# Patient Record
Sex: FEMALE | Race: WHITE | Employment: OTHER | URBAN - METROPOLITAN AREA
[De-identification: names, ages, dates, MRNs, and addresses within clinical notes are randomized per-mention and may not be internally consistent; named-entity substitution may affect disease eponyms.]

---

## 2017-08-15 PROBLEM — L08.9 DIABETIC FOOT INFECTION (HCC): Status: ACTIVE | Noted: 2017-08-15

## 2017-08-15 PROBLEM — Z99.2 CHRONIC KIDNEY DISEASE WITH END STAGE RENAL FAILURE ON DIALYSIS (HCC): Status: ACTIVE | Noted: 2017-08-15

## 2017-08-15 PROBLEM — D72.829 LEUKOCYTOSIS: Status: ACTIVE | Noted: 2017-08-15

## 2017-08-15 PROBLEM — E11.628 DIABETIC FOOT INFECTION (HCC): Status: ACTIVE | Noted: 2017-08-15

## 2017-08-15 PROBLEM — N18.6 CHRONIC KIDNEY DISEASE WITH END STAGE RENAL FAILURE ON DIALYSIS (HCC): Status: ACTIVE | Noted: 2017-08-15

## 2017-08-16 PROBLEM — M86.9 OSTEOMYELITIS OF TOE OF LEFT FOOT (HCC): Status: ACTIVE | Noted: 2017-08-16

## 2017-08-16 PROBLEM — L03.116 CELLULITIS OF LEFT LOWER EXTREMITY: Status: ACTIVE | Noted: 2017-08-16

## 2019-01-01 ENCOUNTER — OFFICE VISIT (OUTPATIENT)
Dept: ORTHOPEDIC SURGERY | Age: 59
End: 2019-01-01

## 2019-01-01 ENCOUNTER — HOSPITAL ENCOUNTER (OUTPATIENT)
Dept: VASCULAR LAB | Age: 59
Discharge: HOME | End: 2019-04-30
Payer: MEDICARE

## 2019-01-01 ENCOUNTER — PREP FOR PROCEDURE (OUTPATIENT)
Dept: VASCULAR SURGERY | Age: 59
End: 2019-01-01

## 2019-01-01 ENCOUNTER — APPOINTMENT (OUTPATIENT)
Dept: GENERAL RADIOLOGY | Age: 59
DRG: 463 | End: 2019-01-01
Payer: MEDICARE

## 2019-01-01 ENCOUNTER — APPOINTMENT (OUTPATIENT)
Dept: NUCLEAR MEDICINE | Age: 59
DRG: 463 | End: 2019-01-01
Payer: MEDICARE

## 2019-01-01 ENCOUNTER — APPOINTMENT (OUTPATIENT)
Dept: CT IMAGING | Age: 59
DRG: 463 | End: 2019-01-01
Payer: MEDICARE

## 2019-01-01 ENCOUNTER — APPOINTMENT (OUTPATIENT)
Dept: GENERAL RADIOLOGY | Age: 59
DRG: 485 | End: 2019-01-01
Payer: MEDICARE

## 2019-01-01 ENCOUNTER — APPOINTMENT (OUTPATIENT)
Dept: GENERAL RADIOLOGY | Age: 59
DRG: 356 | End: 2019-01-01
Payer: MEDICARE

## 2019-01-01 ENCOUNTER — APPOINTMENT (OUTPATIENT)
Dept: CT IMAGING | Age: 59
DRG: 356 | End: 2019-01-01
Payer: MEDICARE

## 2019-01-01 ENCOUNTER — TELEPHONE (OUTPATIENT)
Dept: ORTHOPEDIC SURGERY | Age: 59
End: 2019-01-01

## 2019-01-01 ENCOUNTER — HOSPITAL ENCOUNTER (OUTPATIENT)
Dept: VASCULAR LAB | Age: 59
Discharge: HOME | End: 2019-11-12
Payer: MEDICARE

## 2019-01-01 ENCOUNTER — HOSPITAL ENCOUNTER (OUTPATIENT)
Dept: WOUND CARE | Age: 59
Discharge: HOME | End: 2019-06-17
Payer: MEDICARE

## 2019-01-01 ENCOUNTER — TELEPHONE (OUTPATIENT)
Dept: INFECTIOUS DISEASES | Age: 59
End: 2019-01-01

## 2019-01-01 ENCOUNTER — HOSPITAL ENCOUNTER (OUTPATIENT)
Dept: WOUND CARE | Age: 59
Discharge: HOME | End: 2019-11-12
Payer: MEDICARE

## 2019-01-01 ENCOUNTER — HOSPITAL ENCOUNTER (INPATIENT)
Age: 59
LOS: 4 days | Discharge: HOME | DRG: 356 | End: 2019-12-19
Attending: EMERGENCY MEDICINE | Admitting: INTERNAL MEDICINE
Payer: MEDICARE

## 2019-01-01 ENCOUNTER — HOSPITAL ENCOUNTER (OUTPATIENT)
Dept: WOUND CARE | Age: 59
Discharge: HOME | End: 2019-11-06
Payer: MEDICARE

## 2019-01-01 ENCOUNTER — ANESTHESIA EVENT (OUTPATIENT)
Dept: OPERATING ROOM | Age: 59
DRG: 485 | End: 2019-01-01
Payer: MEDICARE

## 2019-01-01 ENCOUNTER — INITIAL CONSULT (OUTPATIENT)
Dept: SURGERY | Age: 59
End: 2019-01-01
Payer: MEDICARE

## 2019-01-01 ENCOUNTER — ANESTHESIA (OUTPATIENT)
Dept: OPERATING ROOM | Age: 59
DRG: 485 | End: 2019-01-01
Payer: MEDICARE

## 2019-01-01 ENCOUNTER — HOSPITAL ENCOUNTER (OUTPATIENT)
Dept: WOUND CARE | Age: 59
Discharge: HOME | End: 2019-06-27
Payer: MEDICARE

## 2019-01-01 ENCOUNTER — HOSPITAL ENCOUNTER (OUTPATIENT)
Dept: WOUND CARE | Age: 59
Discharge: HOME | End: 2019-07-02
Payer: MEDICARE

## 2019-01-01 ENCOUNTER — TELEPHONE (OUTPATIENT)
Dept: WOUND CARE | Age: 59
End: 2019-01-01

## 2019-01-01 ENCOUNTER — HOSPITAL ENCOUNTER (OUTPATIENT)
Dept: WOUND CARE | Age: 59
Discharge: HOME | End: 2019-09-17
Payer: MEDICARE

## 2019-01-01 ENCOUNTER — HOSPITAL ENCOUNTER (OUTPATIENT)
Dept: CARDIAC CATH/INVASIVE PROCEDURES | Age: 59
Discharge: HOME | End: 2019-06-06
Payer: MEDICARE

## 2019-01-01 ENCOUNTER — HOSPITAL ENCOUNTER (OUTPATIENT)
Dept: WOUND CARE | Age: 59
Discharge: HOME | End: 2019-07-09
Payer: MEDICARE

## 2019-01-01 ENCOUNTER — OFFICE VISIT (OUTPATIENT)
Dept: ORTHOPEDIC SURGERY | Age: 59
End: 2019-01-01
Payer: MEDICARE

## 2019-01-01 ENCOUNTER — HOSPITAL ENCOUNTER (OUTPATIENT)
Dept: WOUND CARE | Age: 59
Discharge: HOME | End: 2019-10-29
Payer: MEDICARE

## 2019-01-01 ENCOUNTER — HOSPITAL ENCOUNTER (INPATIENT)
Age: 59
LOS: 9 days | Discharge: NURSING, REHAB OR LONG-TERM CARE | DRG: 463 | End: 2019-07-25
Attending: EMERGENCY MEDICINE | Admitting: INTERNAL MEDICINE
Payer: MEDICARE

## 2019-01-01 ENCOUNTER — APPOINTMENT (OUTPATIENT)
Dept: VASCULAR LAB | Age: 59
DRG: 463 | End: 2019-01-01
Payer: MEDICARE

## 2019-01-01 ENCOUNTER — ANESTHESIA EVENT (OUTPATIENT)
Dept: OPERATING ROOM | Age: 59
DRG: 463 | End: 2019-01-01
Payer: MEDICARE

## 2019-01-01 ENCOUNTER — HOSPITAL ENCOUNTER (EMERGENCY)
Age: 59
Discharge: HOME | End: 2019-07-27
Attending: EMERGENCY MEDICINE
Payer: MEDICARE

## 2019-01-01 ENCOUNTER — HOSPITAL ENCOUNTER (OUTPATIENT)
Dept: WOUND CARE | Age: 59
Discharge: HOME | DRG: 463 | End: 2019-07-16
Payer: MEDICARE

## 2019-01-01 ENCOUNTER — HOSPITAL ENCOUNTER (INPATIENT)
Age: 59
LOS: 3 days | Discharge: NURSING, REHAB OR LONG-TERM CARE | DRG: 485 | End: 2019-11-27
Attending: EMERGENCY MEDICINE | Admitting: INTERNAL MEDICINE
Payer: MEDICARE

## 2019-01-01 ENCOUNTER — HOSPITAL ENCOUNTER (OUTPATIENT)
Dept: WOUND CARE | Age: 59
Discharge: HOME | End: 2019-11-19
Payer: MEDICARE

## 2019-01-01 ENCOUNTER — ANESTHESIA (OUTPATIENT)
Dept: OPERATING ROOM | Age: 59
DRG: 463 | End: 2019-01-01
Payer: MEDICARE

## 2019-01-01 VITALS
SYSTOLIC BLOOD PRESSURE: 158 MMHG | HEART RATE: 87 BPM | RESPIRATION RATE: 18 BRPM | TEMPERATURE: 98.1 F | DIASTOLIC BLOOD PRESSURE: 66 MMHG

## 2019-01-01 VITALS
RESPIRATION RATE: 16 BRPM | TEMPERATURE: 97.7 F | WEIGHT: 284 LBS | HEIGHT: 67 IN | HEART RATE: 76 BPM | OXYGEN SATURATION: 96 % | SYSTOLIC BLOOD PRESSURE: 126 MMHG | DIASTOLIC BLOOD PRESSURE: 49 MMHG | BODY MASS INDEX: 44.57 KG/M2

## 2019-01-01 VITALS
WEIGHT: 293 LBS | SYSTOLIC BLOOD PRESSURE: 100 MMHG | BODY MASS INDEX: 47.14 KG/M2 | HEART RATE: 100 BPM | DIASTOLIC BLOOD PRESSURE: 51 MMHG

## 2019-01-01 VITALS — DIASTOLIC BLOOD PRESSURE: 65 MMHG | SYSTOLIC BLOOD PRESSURE: 149 MMHG | TEMPERATURE: 96.8 F | OXYGEN SATURATION: 98 %

## 2019-01-01 VITALS
BODY MASS INDEX: 45.99 KG/M2 | DIASTOLIC BLOOD PRESSURE: 60 MMHG | HEART RATE: 93 BPM | WEIGHT: 293 LBS | SYSTOLIC BLOOD PRESSURE: 120 MMHG | HEIGHT: 67 IN

## 2019-01-01 VITALS
HEIGHT: 67 IN | TEMPERATURE: 98.2 F | OXYGEN SATURATION: 97 % | SYSTOLIC BLOOD PRESSURE: 164 MMHG | RESPIRATION RATE: 14 BRPM | BODY MASS INDEX: 41.18 KG/M2 | DIASTOLIC BLOOD PRESSURE: 83 MMHG | WEIGHT: 262.35 LBS | HEART RATE: 79 BPM

## 2019-01-01 VITALS
TEMPERATURE: 98 F | BODY MASS INDEX: 44.12 KG/M2 | SYSTOLIC BLOOD PRESSURE: 130 MMHG | HEIGHT: 67 IN | HEIGHT: 67 IN | BODY MASS INDEX: 44.57 KG/M2 | WEIGHT: 284 LBS | WEIGHT: 281.09 LBS | RESPIRATION RATE: 18 BRPM | DIASTOLIC BLOOD PRESSURE: 56 MMHG | HEART RATE: 91 BPM

## 2019-01-01 VITALS
DIASTOLIC BLOOD PRESSURE: 78 MMHG | HEART RATE: 111 BPM | RESPIRATION RATE: 16 BRPM | TEMPERATURE: 98 F | SYSTOLIC BLOOD PRESSURE: 163 MMHG

## 2019-01-01 VITALS — BODY MASS INDEX: 44.57 KG/M2 | HEIGHT: 67 IN | WEIGHT: 284 LBS | RESPIRATION RATE: 16 BRPM

## 2019-01-01 VITALS
HEART RATE: 78 BPM | WEIGHT: 292.2 LBS | OXYGEN SATURATION: 100 % | DIASTOLIC BLOOD PRESSURE: 63 MMHG | RESPIRATION RATE: 16 BRPM | SYSTOLIC BLOOD PRESSURE: 153 MMHG | HEIGHT: 67 IN | BODY MASS INDEX: 45.86 KG/M2 | TEMPERATURE: 98.4 F

## 2019-01-01 VITALS
BODY MASS INDEX: 43.43 KG/M2 | SYSTOLIC BLOOD PRESSURE: 144 MMHG | OXYGEN SATURATION: 98 % | HEART RATE: 76 BPM | WEIGHT: 276.7 LBS | HEIGHT: 67 IN | RESPIRATION RATE: 16 BRPM | DIASTOLIC BLOOD PRESSURE: 74 MMHG | TEMPERATURE: 98.4 F

## 2019-01-01 VITALS — TEMPERATURE: 97.9 F | DIASTOLIC BLOOD PRESSURE: 85 MMHG | OXYGEN SATURATION: 88 % | SYSTOLIC BLOOD PRESSURE: 164 MMHG

## 2019-01-01 VITALS
TEMPERATURE: 97.9 F | WEIGHT: 293 LBS | HEART RATE: 92 BPM | BODY MASS INDEX: 45.99 KG/M2 | RESPIRATION RATE: 18 BRPM | HEIGHT: 67 IN | DIASTOLIC BLOOD PRESSURE: 68 MMHG | SYSTOLIC BLOOD PRESSURE: 138 MMHG

## 2019-01-01 VITALS
RESPIRATION RATE: 18 BRPM | SYSTOLIC BLOOD PRESSURE: 169 MMHG | HEART RATE: 87 BPM | TEMPERATURE: 97.8 F | DIASTOLIC BLOOD PRESSURE: 64 MMHG

## 2019-01-01 VITALS
HEART RATE: 88 BPM | SYSTOLIC BLOOD PRESSURE: 113 MMHG | DIASTOLIC BLOOD PRESSURE: 69 MMHG | TEMPERATURE: 97.7 F | RESPIRATION RATE: 16 BRPM

## 2019-01-01 VITALS
HEART RATE: 76 BPM | HEIGHT: 67 IN | TEMPERATURE: 97.8 F | BODY MASS INDEX: 44.57 KG/M2 | RESPIRATION RATE: 16 BRPM | WEIGHT: 284 LBS

## 2019-01-01 VITALS — BODY MASS INDEX: 45.99 KG/M2 | HEIGHT: 67 IN | WEIGHT: 293 LBS

## 2019-01-01 VITALS
TEMPERATURE: 98.1 F | HEART RATE: 92 BPM | RESPIRATION RATE: 20 BRPM | DIASTOLIC BLOOD PRESSURE: 58 MMHG | RESPIRATION RATE: 18 BRPM | TEMPERATURE: 97.7 F | SYSTOLIC BLOOD PRESSURE: 133 MMHG | DIASTOLIC BLOOD PRESSURE: 71 MMHG | HEART RATE: 97 BPM | SYSTOLIC BLOOD PRESSURE: 124 MMHG

## 2019-01-01 VITALS — BODY MASS INDEX: 44.57 KG/M2 | WEIGHT: 284 LBS | HEIGHT: 67 IN

## 2019-01-01 VITALS
RESPIRATION RATE: 16 BRPM | TEMPERATURE: 98.1 F | SYSTOLIC BLOOD PRESSURE: 139 MMHG | HEART RATE: 93 BPM | DIASTOLIC BLOOD PRESSURE: 59 MMHG

## 2019-01-01 VITALS
RESPIRATION RATE: 18 BRPM | DIASTOLIC BLOOD PRESSURE: 65 MMHG | TEMPERATURE: 98.3 F | SYSTOLIC BLOOD PRESSURE: 157 MMHG | HEART RATE: 89 BPM

## 2019-01-01 DIAGNOSIS — E08.621 DIABETIC ULCER OF LEFT HEEL ASSOCIATED WITH DIABETES MELLITUS DUE TO UNDERLYING CONDITION, WITH FAT LAYER EXPOSED (HCC): ICD-10-CM

## 2019-01-01 DIAGNOSIS — I70.244 ATHEROSCLEROSIS OF NATIVE ARTERY OF LEFT LOWER EXTREMITY WITH ULCERATION OF HEEL (HCC): ICD-10-CM

## 2019-01-01 DIAGNOSIS — G62.9 NEUROPATHY: ICD-10-CM

## 2019-01-01 DIAGNOSIS — I70.229 CRITICAL LOWER LIMB ISCHEMIA (HCC): ICD-10-CM

## 2019-01-01 DIAGNOSIS — I73.9 PAD (PERIPHERAL ARTERY DISEASE) (HCC): ICD-10-CM

## 2019-01-01 DIAGNOSIS — E11.51 TYPE 2 DIABETES MELLITUS WITH ATHEROSCLEROSIS OF NATIVE ARTERIES OF EXTREMITY WITH INTERMITTENT CLAUDICATION (HCC): Primary | ICD-10-CM

## 2019-01-01 DIAGNOSIS — I89.0 LYMPHEDEMA: ICD-10-CM

## 2019-01-01 DIAGNOSIS — I87.2 VENOUS (PERIPHERAL) INSUFFICIENCY: ICD-10-CM

## 2019-01-01 DIAGNOSIS — M79.604 PAIN IN BOTH LOWER EXTREMITIES: Primary | ICD-10-CM

## 2019-01-01 DIAGNOSIS — M79.605 PAIN AND SWELLING OF LEFT LOWER EXTREMITY: ICD-10-CM

## 2019-01-01 DIAGNOSIS — M79.89 PAIN AND SWELLING OF LEFT LOWER EXTREMITY: ICD-10-CM

## 2019-01-01 DIAGNOSIS — M00.9 PYOGENIC ARTHRITIS OF LEFT KNEE JOINT, DUE TO UNSPECIFIED ORGANISM (HCC): Primary | ICD-10-CM

## 2019-01-01 DIAGNOSIS — M79.89 PAIN AND SWELLING OF LEFT LOWER EXTREMITY: Primary | ICD-10-CM

## 2019-01-01 DIAGNOSIS — R60.0 LOCALIZED EDEMA: ICD-10-CM

## 2019-01-01 DIAGNOSIS — E11.628 DIABETIC FOOT INFECTION (HCC): ICD-10-CM

## 2019-01-01 DIAGNOSIS — Z48.89 ENCOUNTER FOR POST SURGICAL WOUND CHECK: Primary | ICD-10-CM

## 2019-01-01 DIAGNOSIS — T14.8XXA HEMATOMA: Primary | ICD-10-CM

## 2019-01-01 DIAGNOSIS — K56.609 SMALL BOWEL OBSTRUCTION (HCC): Primary | ICD-10-CM

## 2019-01-01 DIAGNOSIS — M79.605 LEG PAIN, LEFT: ICD-10-CM

## 2019-01-01 DIAGNOSIS — I70.219 TYPE 2 DIABETES MELLITUS WITH ATHEROSCLEROSIS OF NATIVE ARTERIES OF EXTREMITY WITH INTERMITTENT CLAUDICATION (HCC): ICD-10-CM

## 2019-01-01 DIAGNOSIS — K43.6 VENTRAL HERNIA WITH BOWEL OBSTRUCTION: ICD-10-CM

## 2019-01-01 DIAGNOSIS — L97.422 DIABETIC ULCER OF LEFT HEEL ASSOCIATED WITH DIABETES MELLITUS DUE TO UNDERLYING CONDITION, WITH FAT LAYER EXPOSED (HCC): ICD-10-CM

## 2019-01-01 DIAGNOSIS — L08.9 DIABETIC FOOT INFECTION (HCC): ICD-10-CM

## 2019-01-01 DIAGNOSIS — R60.0 LOCALIZED EDEMA: Primary | ICD-10-CM

## 2019-01-01 DIAGNOSIS — L97.422 DIABETIC ULCER OF LEFT HEEL ASSOCIATED WITH TYPE 2 DIABETES MELLITUS, WITH FAT LAYER EXPOSED (HCC): Primary | ICD-10-CM

## 2019-01-01 DIAGNOSIS — L03.116 CELLULITIS OF LEFT LOWER EXTREMITY: ICD-10-CM

## 2019-01-01 DIAGNOSIS — M79.89 LEG SWELLING: ICD-10-CM

## 2019-01-01 DIAGNOSIS — L97.522 ULCER OF LEFT FOOT, WITH FAT LAYER EXPOSED (HCC): ICD-10-CM

## 2019-01-01 DIAGNOSIS — M79.604 PAIN IN BOTH LOWER EXTREMITIES: ICD-10-CM

## 2019-01-01 DIAGNOSIS — L97.422 DIABETIC ULCER OF LEFT HEEL ASSOCIATED WITH TYPE 2 DIABETES MELLITUS, WITH FAT LAYER EXPOSED (HCC): ICD-10-CM

## 2019-01-01 DIAGNOSIS — M79.605 PAIN AND SWELLING OF LEFT LOWER EXTREMITY: Primary | ICD-10-CM

## 2019-01-01 DIAGNOSIS — E11.51 TYPE 2 DIABETES MELLITUS WITH ATHEROSCLEROSIS OF NATIVE ARTERIES OF EXTREMITY WITH INTERMITTENT CLAUDICATION (HCC): ICD-10-CM

## 2019-01-01 DIAGNOSIS — I70.219 TYPE 2 DIABETES MELLITUS WITH ATHEROSCLEROSIS OF NATIVE ARTERIES OF EXTREMITY WITH INTERMITTENT CLAUDICATION (HCC): Primary | ICD-10-CM

## 2019-01-01 DIAGNOSIS — R60.0 LEG EDEMA, LEFT: ICD-10-CM

## 2019-01-01 DIAGNOSIS — L08.9 DIABETIC FOOT INFECTION (HCC): Primary | ICD-10-CM

## 2019-01-01 DIAGNOSIS — S80.12XA HEMATOMA OF LOWER EXTREMITY, LEFT, INITIAL ENCOUNTER: ICD-10-CM

## 2019-01-01 DIAGNOSIS — L02.416 ABSCESS OF LEFT LEG: ICD-10-CM

## 2019-01-01 DIAGNOSIS — M79.605 PAIN IN BOTH LOWER EXTREMITIES: ICD-10-CM

## 2019-01-01 DIAGNOSIS — N18.6 END STAGE RENAL DISEASE (HCC): ICD-10-CM

## 2019-01-01 DIAGNOSIS — I70.244 ATHEROSCLEROSIS OF NATIVE ARTERY OF LEFT LOWER EXTREMITY WITH ULCERATION OF HEEL (HCC): Primary | ICD-10-CM

## 2019-01-01 DIAGNOSIS — L97.422 DIABETIC ULCER OF LEFT HEEL ASSOCIATED WITH DIABETES MELLITUS DUE TO UNDERLYING CONDITION, WITH FAT LAYER EXPOSED (HCC): Primary | ICD-10-CM

## 2019-01-01 DIAGNOSIS — R60.0 LEG EDEMA, LEFT: Primary | ICD-10-CM

## 2019-01-01 DIAGNOSIS — Z98.890 STATUS POST INCISION AND DRAINAGE: ICD-10-CM

## 2019-01-01 DIAGNOSIS — E08.621 DIABETIC ULCER OF LEFT HEEL ASSOCIATED WITH DIABETES MELLITUS DUE TO UNDERLYING CONDITION, WITH FAT LAYER EXPOSED (HCC): Primary | ICD-10-CM

## 2019-01-01 DIAGNOSIS — E11.621 DIABETIC ULCER OF LEFT HEEL ASSOCIATED WITH TYPE 2 DIABETES MELLITUS, WITH FAT LAYER EXPOSED (HCC): Primary | ICD-10-CM

## 2019-01-01 DIAGNOSIS — E11.621 DIABETIC ULCER OF LEFT HEEL ASSOCIATED WITH TYPE 2 DIABETES MELLITUS, WITH FAT LAYER EXPOSED (HCC): ICD-10-CM

## 2019-01-01 DIAGNOSIS — E11.628 DIABETIC FOOT INFECTION (HCC): Primary | ICD-10-CM

## 2019-01-01 DIAGNOSIS — M79.605 PAIN IN BOTH LOWER EXTREMITIES: Primary | ICD-10-CM

## 2019-01-01 DIAGNOSIS — E66.01 MORBID OBESITY (HCC): ICD-10-CM

## 2019-01-01 DIAGNOSIS — M00.9 PYOGENIC ARTHRITIS OF LEFT KNEE JOINT, DUE TO UNSPECIFIED ORGANISM (HCC): ICD-10-CM

## 2019-01-01 LAB
ABO/RH: NORMAL
ALBUMIN SERPL-MCNC: 2.8 G/DL (ref 3.4–5)
ALBUMIN SERPL-MCNC: 2.9 G/DL (ref 3.4–5)
ALBUMIN SERPL-MCNC: 3 G/DL (ref 3.4–5)
ALBUMIN SERPL-MCNC: 3.1 G/DL (ref 3.4–5)
ALBUMIN SERPL-MCNC: 3.2 G/DL (ref 3.4–5)
ALBUMIN SERPL-MCNC: 3.3 G/DL (ref 3.4–5)
ALBUMIN SERPL-MCNC: 3.4 G/DL (ref 3.4–5)
ALBUMIN SERPL-MCNC: 3.5 G/DL (ref 3.4–5)
ALBUMIN SERPL-MCNC: 3.6 G/DL (ref 3.4–5)
ALBUMIN SERPL-MCNC: 4.1 G/DL (ref 3.4–5)
ALP BLD-CCNC: 116 U/L (ref 40–129)
ALP BLD-CCNC: 99 U/L (ref 40–129)
ALT SERPL-CCNC: 24 U/L (ref 10–40)
ALT SERPL-CCNC: 31 U/L (ref 10–40)
ANAEROBIC CULTURE: NORMAL
ANION GAP SERPL CALCULATED.3IONS-SCNC: 14 MMOL/L (ref 3–16)
ANION GAP SERPL CALCULATED.3IONS-SCNC: 14 MMOL/L (ref 3–16)
ANION GAP SERPL CALCULATED.3IONS-SCNC: 16 MMOL/L (ref 3–16)
ANION GAP SERPL CALCULATED.3IONS-SCNC: 16 MMOL/L (ref 3–16)
ANION GAP SERPL CALCULATED.3IONS-SCNC: 17 MMOL/L (ref 3–16)
ANION GAP SERPL CALCULATED.3IONS-SCNC: 18 MMOL/L (ref 3–16)
ANION GAP SERPL CALCULATED.3IONS-SCNC: 19 MMOL/L (ref 3–16)
ANION GAP SERPL CALCULATED.3IONS-SCNC: 19 MMOL/L (ref 3–16)
ANION GAP SERPL CALCULATED.3IONS-SCNC: 20 MMOL/L (ref 3–16)
ANION GAP SERPL CALCULATED.3IONS-SCNC: 21 MMOL/L (ref 3–16)
ANION GAP SERPL CALCULATED.3IONS-SCNC: 22 MMOL/L (ref 3–16)
ANION GAP SERPL CALCULATED.3IONS-SCNC: 23 MMOL/L (ref 3–16)
ANTIBODY SCREEN: NORMAL
APPEARANCE FLUID: NORMAL
APPEARANCE FLUID: NORMAL
AST SERPL-CCNC: 21 U/L (ref 15–37)
AST SERPL-CCNC: 30 U/L (ref 15–37)
BASE EXCESS VENOUS: 6 (ref -3–3)
BASOPHILS ABSOLUTE: 0 K/UL (ref 0–0.2)
BASOPHILS ABSOLUTE: 0.1 K/UL (ref 0–0.2)
BASOPHILS RELATIVE PERCENT: 0 %
BASOPHILS RELATIVE PERCENT: 0.1 %
BASOPHILS RELATIVE PERCENT: 0.3 %
BASOPHILS RELATIVE PERCENT: 0.5 %
BASOPHILS RELATIVE PERCENT: 0.6 %
BASOPHILS RELATIVE PERCENT: 0.7 %
BASOPHILS RELATIVE PERCENT: 0.7 %
BASOPHILS RELATIVE PERCENT: 0.8 %
BILIRUB SERPL-MCNC: 0.5 MG/DL (ref 0–1)
BILIRUB SERPL-MCNC: 0.5 MG/DL (ref 0–1)
BILIRUBIN DIRECT: 0.3 MG/DL (ref 0–0.3)
BILIRUBIN DIRECT: <0.2 MG/DL (ref 0–0.3)
BILIRUBIN, INDIRECT: 0.2 MG/DL (ref 0–1)
BILIRUBIN, INDIRECT: ABNORMAL MG/DL (ref 0–1)
BLOOD CULTURE, ROUTINE: NORMAL
BODY FLUID CULTURE, STERILE: ABNORMAL
BODY FLUID CULTURE, STERILE: NORMAL
BUN BLDV-MCNC: 16 MG/DL (ref 7–20)
BUN BLDV-MCNC: 23 MG/DL (ref 7–20)
BUN BLDV-MCNC: 23 MG/DL (ref 7–20)
BUN BLDV-MCNC: 27 MG/DL (ref 7–20)
BUN BLDV-MCNC: 27 MG/DL (ref 7–20)
BUN BLDV-MCNC: 29 MG/DL (ref 7–20)
BUN BLDV-MCNC: 35 MG/DL (ref 7–20)
BUN BLDV-MCNC: 38 MG/DL (ref 7–20)
BUN BLDV-MCNC: 38 MG/DL (ref 7–20)
BUN BLDV-MCNC: 39 MG/DL (ref 7–20)
BUN BLDV-MCNC: 43 MG/DL (ref 7–20)
BUN BLDV-MCNC: 44 MG/DL (ref 7–20)
BUN BLDV-MCNC: 47 MG/DL (ref 7–20)
BUN BLDV-MCNC: 48 MG/DL (ref 7–20)
BUN BLDV-MCNC: 55 MG/DL (ref 7–20)
BUN BLDV-MCNC: 56 MG/DL (ref 7–20)
BUN BLDV-MCNC: 57 MG/DL (ref 7–20)
BUN BLDV-MCNC: 65 MG/DL (ref 7–20)
BUN BLDV-MCNC: 65 MG/DL (ref 7–20)
BUN BLDV-MCNC: 66 MG/DL (ref 7–20)
BUN BLDV-MCNC: 66 MG/DL (ref 7–20)
BUN BLDV-MCNC: 83 MG/DL (ref 7–20)
C-REACTIVE PROTEIN: 157.5 MG/L (ref 0–5.1)
C-REACTIVE PROTEIN: 473.3 MG/L (ref 0–5.1)
C-REACTIVE PROTEIN: 90.6 MG/L (ref 0–5.1)
C-REACTIVE PROTEIN: 98.2 MG/L (ref 0–5.1)
CALCIUM SERPL-MCNC: 10 MG/DL (ref 8.3–10.6)
CALCIUM SERPL-MCNC: 10 MG/DL (ref 8.3–10.6)
CALCIUM SERPL-MCNC: 10.3 MG/DL (ref 8.3–10.6)
CALCIUM SERPL-MCNC: 10.4 MG/DL (ref 8.3–10.6)
CALCIUM SERPL-MCNC: 8.2 MG/DL (ref 8.3–10.6)
CALCIUM SERPL-MCNC: 8.4 MG/DL (ref 8.3–10.6)
CALCIUM SERPL-MCNC: 8.5 MG/DL (ref 8.3–10.6)
CALCIUM SERPL-MCNC: 8.9 MG/DL (ref 8.3–10.6)
CALCIUM SERPL-MCNC: 9 MG/DL (ref 8.3–10.6)
CALCIUM SERPL-MCNC: 9 MG/DL (ref 8.3–10.6)
CALCIUM SERPL-MCNC: 9.1 MG/DL (ref 8.3–10.6)
CALCIUM SERPL-MCNC: 9.1 MG/DL (ref 8.3–10.6)
CALCIUM SERPL-MCNC: 9.2 MG/DL (ref 8.3–10.6)
CALCIUM SERPL-MCNC: 9.2 MG/DL (ref 8.3–10.6)
CALCIUM SERPL-MCNC: 9.3 MG/DL (ref 8.3–10.6)
CALCIUM SERPL-MCNC: 9.3 MG/DL (ref 8.3–10.6)
CALCIUM SERPL-MCNC: 9.4 MG/DL (ref 8.3–10.6)
CALCIUM SERPL-MCNC: 9.5 MG/DL (ref 8.3–10.6)
CALCIUM SERPL-MCNC: 9.5 MG/DL (ref 8.3–10.6)
CALCIUM SERPL-MCNC: 9.7 MG/DL (ref 8.3–10.6)
CALCIUM SERPL-MCNC: 9.8 MG/DL (ref 8.3–10.6)
CALCIUM SERPL-MCNC: 9.9 MG/DL (ref 8.3–10.6)
CELL COUNT FLUID TYPE: NORMAL
CELL COUNT FLUID TYPE: NORMAL
CHLORIDE BLD-SCNC: 87 MMOL/L (ref 99–110)
CHLORIDE BLD-SCNC: 88 MMOL/L (ref 99–110)
CHLORIDE BLD-SCNC: 89 MMOL/L (ref 99–110)
CHLORIDE BLD-SCNC: 90 MMOL/L (ref 99–110)
CHLORIDE BLD-SCNC: 91 MMOL/L (ref 99–110)
CHLORIDE BLD-SCNC: 92 MMOL/L (ref 99–110)
CHLORIDE BLD-SCNC: 93 MMOL/L (ref 99–110)
CHLORIDE BLD-SCNC: 93 MMOL/L (ref 99–110)
CHLORIDE BLD-SCNC: 95 MMOL/L (ref 99–110)
CLOT EVALUATION: NORMAL
CLOT EVALUATION: NORMAL
CO2: 21 MMOL/L (ref 21–32)
CO2: 22 MMOL/L (ref 21–32)
CO2: 23 MMOL/L (ref 21–32)
CO2: 23 MMOL/L (ref 21–32)
CO2: 24 MMOL/L (ref 21–32)
CO2: 25 MMOL/L (ref 21–32)
CO2: 26 MMOL/L (ref 21–32)
CO2: 27 MMOL/L (ref 21–32)
CO2: 27 MMOL/L (ref 21–32)
CO2: 29 MMOL/L (ref 21–32)
CO2: 30 MMOL/L (ref 21–32)
COLOR FLUID: NORMAL
COLOR FLUID: NORMAL
CREAT SERPL-MCNC: 10.2 MG/DL (ref 0.6–1.1)
CREAT SERPL-MCNC: 10.3 MG/DL (ref 0.6–1.1)
CREAT SERPL-MCNC: 10.6 MG/DL (ref 0.6–1.1)
CREAT SERPL-MCNC: 4.7 MG/DL (ref 0.6–1.1)
CREAT SERPL-MCNC: 5 MG/DL (ref 0.6–1.1)
CREAT SERPL-MCNC: 5.8 MG/DL (ref 0.6–1.1)
CREAT SERPL-MCNC: 5.8 MG/DL (ref 0.6–1.1)
CREAT SERPL-MCNC: 5.9 MG/DL (ref 0.6–1.1)
CREAT SERPL-MCNC: 6.2 MG/DL (ref 0.6–1.1)
CREAT SERPL-MCNC: 6.5 MG/DL (ref 0.6–1.1)
CREAT SERPL-MCNC: 6.7 MG/DL (ref 0.6–1.1)
CREAT SERPL-MCNC: 6.7 MG/DL (ref 0.6–1.1)
CREAT SERPL-MCNC: 6.8 MG/DL (ref 0.6–1.1)
CREAT SERPL-MCNC: 8.3 MG/DL (ref 0.6–1.1)
CREAT SERPL-MCNC: 8.3 MG/DL (ref 0.6–1.1)
CREAT SERPL-MCNC: 8.7 MG/DL (ref 0.6–1.1)
CREAT SERPL-MCNC: 8.7 MG/DL (ref 0.6–1.1)
CREAT SERPL-MCNC: 9 MG/DL (ref 0.6–1.1)
CREAT SERPL-MCNC: 9 MG/DL (ref 0.6–1.1)
CREAT SERPL-MCNC: 9.3 MG/DL (ref 0.6–1.1)
CREAT SERPL-MCNC: 9.3 MG/DL (ref 0.6–1.1)
CREAT SERPL-MCNC: 9.4 MG/DL (ref 0.6–1.1)
CULTURE, BLOOD 2: NORMAL
EKG ATRIAL RATE: 85 BPM
EKG ATRIAL RATE: 87 BPM
EKG ATRIAL RATE: 91 BPM
EKG DIAGNOSIS: NORMAL
EKG P AXIS: 1 DEGREES
EKG P AXIS: 14 DEGREES
EKG P AXIS: 18 DEGREES
EKG P-R INTERVAL: 156 MS
EKG P-R INTERVAL: 158 MS
EKG P-R INTERVAL: 160 MS
EKG Q-T INTERVAL: 360 MS
EKG Q-T INTERVAL: 374 MS
EKG Q-T INTERVAL: 380 MS
EKG QRS DURATION: 84 MS
EKG QRS DURATION: 84 MS
EKG QRS DURATION: 90 MS
EKG QTC CALCULATION (BAZETT): 433 MS
EKG QTC CALCULATION (BAZETT): 452 MS
EKG QTC CALCULATION (BAZETT): 460 MS
EKG R AXIS: -14 DEGREES
EKG R AXIS: -14 DEGREES
EKG R AXIS: -37 DEGREES
EKG T AXIS: 37 DEGREES
EKG T AXIS: 48 DEGREES
EKG T AXIS: 66 DEGREES
EKG VENTRICULAR RATE: 85 BPM
EKG VENTRICULAR RATE: 87 BPM
EKG VENTRICULAR RATE: 91 BPM
EOSINOPHIL FLUID: 1 %
EOSINOPHILS ABSOLUTE: 0 K/UL (ref 0–0.6)
EOSINOPHILS ABSOLUTE: 0.1 K/UL (ref 0–0.6)
EOSINOPHILS ABSOLUTE: 0.2 K/UL (ref 0–0.6)
EOSINOPHILS ABSOLUTE: 0.3 K/UL (ref 0–0.6)
EOSINOPHILS RELATIVE PERCENT: 0 %
EOSINOPHILS RELATIVE PERCENT: 0 %
EOSINOPHILS RELATIVE PERCENT: 0.4 %
EOSINOPHILS RELATIVE PERCENT: 0.9 %
EOSINOPHILS RELATIVE PERCENT: 1.7 %
EOSINOPHILS RELATIVE PERCENT: 2.1 %
EOSINOPHILS RELATIVE PERCENT: 2.2 %
EOSINOPHILS RELATIVE PERCENT: 2.4 %
EOSINOPHILS RELATIVE PERCENT: 2.5 %
EOSINOPHILS RELATIVE PERCENT: 3 %
EOSINOPHILS RELATIVE PERCENT: 3 %
EOSINOPHILS RELATIVE PERCENT: 3.7 %
ESTIMATED AVERAGE GLUCOSE: 154.2 MG/DL
ESTIMATED AVERAGE GLUCOSE: 159.9 MG/DL
FERRITIN: 633.4 NG/ML (ref 15–150)
FOLATE: >20 NG/ML (ref 4.78–24.2)
FUNGUS (MYCOLOGY) CULTURE: NORMAL
FUNGUS STAIN: NORMAL
GFR AFRICAN AMERICAN: 11
GFR AFRICAN AMERICAN: 11
GFR AFRICAN AMERICAN: 4
GFR AFRICAN AMERICAN: 5
GFR AFRICAN AMERICAN: 6
GFR AFRICAN AMERICAN: 7
GFR AFRICAN AMERICAN: 8
GFR AFRICAN AMERICAN: 9
GFR NON-AFRICAN AMERICAN: 4
GFR NON-AFRICAN AMERICAN: 5
GFR NON-AFRICAN AMERICAN: 6
GFR NON-AFRICAN AMERICAN: 7
GFR NON-AFRICAN AMERICAN: 9
GFR NON-AFRICAN AMERICAN: 9
GLUCOSE BLD-MCNC: 102 MG/DL (ref 70–99)
GLUCOSE BLD-MCNC: 102 MG/DL (ref 70–99)
GLUCOSE BLD-MCNC: 103 MG/DL (ref 70–99)
GLUCOSE BLD-MCNC: 103 MG/DL (ref 70–99)
GLUCOSE BLD-MCNC: 104 MG/DL (ref 70–99)
GLUCOSE BLD-MCNC: 107 MG/DL (ref 70–99)
GLUCOSE BLD-MCNC: 109 MG/DL (ref 70–99)
GLUCOSE BLD-MCNC: 110 MG/DL (ref 70–99)
GLUCOSE BLD-MCNC: 111 MG/DL (ref 70–99)
GLUCOSE BLD-MCNC: 111 MG/DL (ref 70–99)
GLUCOSE BLD-MCNC: 116 MG/DL (ref 70–99)
GLUCOSE BLD-MCNC: 119 MG/DL (ref 70–99)
GLUCOSE BLD-MCNC: 122 MG/DL (ref 70–99)
GLUCOSE BLD-MCNC: 123 MG/DL (ref 70–99)
GLUCOSE BLD-MCNC: 129 MG/DL (ref 70–99)
GLUCOSE BLD-MCNC: 130 MG/DL (ref 70–99)
GLUCOSE BLD-MCNC: 130 MG/DL (ref 70–99)
GLUCOSE BLD-MCNC: 131 MG/DL (ref 70–99)
GLUCOSE BLD-MCNC: 131 MG/DL (ref 70–99)
GLUCOSE BLD-MCNC: 132 MG/DL (ref 70–99)
GLUCOSE BLD-MCNC: 134 MG/DL (ref 70–99)
GLUCOSE BLD-MCNC: 134 MG/DL (ref 70–99)
GLUCOSE BLD-MCNC: 136 MG/DL (ref 70–99)
GLUCOSE BLD-MCNC: 137 MG/DL (ref 70–99)
GLUCOSE BLD-MCNC: 137 MG/DL (ref 70–99)
GLUCOSE BLD-MCNC: 139 MG/DL (ref 70–99)
GLUCOSE BLD-MCNC: 147 MG/DL (ref 70–99)
GLUCOSE BLD-MCNC: 148 MG/DL (ref 70–99)
GLUCOSE BLD-MCNC: 149 MG/DL (ref 70–99)
GLUCOSE BLD-MCNC: 150 MG/DL (ref 70–99)
GLUCOSE BLD-MCNC: 151 MG/DL (ref 70–99)
GLUCOSE BLD-MCNC: 152 MG/DL (ref 70–99)
GLUCOSE BLD-MCNC: 153 MG/DL (ref 70–99)
GLUCOSE BLD-MCNC: 156 MG/DL (ref 70–99)
GLUCOSE BLD-MCNC: 156 MG/DL (ref 70–99)
GLUCOSE BLD-MCNC: 158 MG/DL (ref 70–99)
GLUCOSE BLD-MCNC: 159 MG/DL (ref 70–99)
GLUCOSE BLD-MCNC: 160 MG/DL (ref 70–99)
GLUCOSE BLD-MCNC: 160 MG/DL (ref 70–99)
GLUCOSE BLD-MCNC: 165 MG/DL (ref 70–99)
GLUCOSE BLD-MCNC: 166 MG/DL (ref 70–99)
GLUCOSE BLD-MCNC: 166 MG/DL (ref 70–99)
GLUCOSE BLD-MCNC: 168 MG/DL (ref 70–99)
GLUCOSE BLD-MCNC: 170 MG/DL (ref 70–99)
GLUCOSE BLD-MCNC: 172 MG/DL (ref 70–99)
GLUCOSE BLD-MCNC: 173 MG/DL (ref 70–99)
GLUCOSE BLD-MCNC: 175 MG/DL (ref 70–99)
GLUCOSE BLD-MCNC: 177 MG/DL (ref 70–99)
GLUCOSE BLD-MCNC: 178 MG/DL (ref 70–99)
GLUCOSE BLD-MCNC: 180 MG/DL (ref 70–99)
GLUCOSE BLD-MCNC: 181 MG/DL (ref 70–99)
GLUCOSE BLD-MCNC: 182 MG/DL (ref 70–99)
GLUCOSE BLD-MCNC: 183 MG/DL (ref 70–99)
GLUCOSE BLD-MCNC: 187 MG/DL (ref 70–99)
GLUCOSE BLD-MCNC: 189 MG/DL (ref 70–99)
GLUCOSE BLD-MCNC: 195 MG/DL (ref 70–99)
GLUCOSE BLD-MCNC: 201 MG/DL (ref 70–99)
GLUCOSE BLD-MCNC: 202 MG/DL (ref 70–99)
GLUCOSE BLD-MCNC: 208 MG/DL (ref 70–99)
GLUCOSE BLD-MCNC: 213 MG/DL (ref 70–99)
GLUCOSE BLD-MCNC: 216 MG/DL (ref 70–99)
GLUCOSE BLD-MCNC: 219 MG/DL (ref 70–99)
GLUCOSE BLD-MCNC: 220 MG/DL (ref 70–99)
GLUCOSE BLD-MCNC: 223 MG/DL (ref 70–99)
GLUCOSE BLD-MCNC: 229 MG/DL (ref 70–99)
GLUCOSE BLD-MCNC: 242 MG/DL (ref 70–99)
GLUCOSE BLD-MCNC: 245 MG/DL (ref 70–99)
GLUCOSE BLD-MCNC: 254 MG/DL (ref 70–99)
GLUCOSE BLD-MCNC: 261 MG/DL (ref 70–99)
GLUCOSE BLD-MCNC: 263 MG/DL (ref 70–99)
GLUCOSE BLD-MCNC: 309 MG/DL (ref 70–99)
GLUCOSE BLD-MCNC: 77 MG/DL (ref 70–99)
GLUCOSE BLD-MCNC: 79 MG/DL (ref 70–99)
GLUCOSE BLD-MCNC: 79 MG/DL (ref 70–99)
GLUCOSE BLD-MCNC: 81 MG/DL (ref 70–99)
GLUCOSE BLD-MCNC: 83 MG/DL (ref 70–99)
GLUCOSE BLD-MCNC: 85 MG/DL (ref 70–99)
GLUCOSE BLD-MCNC: 87 MG/DL (ref 70–99)
GLUCOSE BLD-MCNC: 87 MG/DL (ref 70–99)
GLUCOSE BLD-MCNC: 88 MG/DL (ref 70–99)
GLUCOSE BLD-MCNC: 88 MG/DL (ref 70–99)
GLUCOSE BLD-MCNC: 90 MG/DL (ref 70–99)
GLUCOSE BLD-MCNC: 90 MG/DL (ref 70–99)
GLUCOSE BLD-MCNC: 93 MG/DL (ref 70–99)
GLUCOSE BLD-MCNC: 95 MG/DL (ref 70–99)
GLUCOSE BLD-MCNC: 97 MG/DL (ref 70–99)
GLUCOSE BLD-MCNC: 97 MG/DL (ref 70–99)
GLUCOSE BLD-MCNC: 99 MG/DL (ref 70–99)
GRAM STAIN RESULT: ABNORMAL
GRAM STAIN RESULT: NORMAL
HAV IGM SER IA-ACNC: NORMAL
HBA1C MFR BLD: 7 %
HBA1C MFR BLD: 7.2 %
HBV SURFACE AB TITR SER: <3.5 MIU/ML
HCO3 VENOUS: 29.5 MMOL/L (ref 23–29)
HCT VFR BLD CALC: 23.1 % (ref 36–48)
HCT VFR BLD CALC: 23.2 % (ref 36–48)
HCT VFR BLD CALC: 23.4 % (ref 36–48)
HCT VFR BLD CALC: 23.9 % (ref 36–48)
HCT VFR BLD CALC: 24.9 % (ref 36–48)
HCT VFR BLD CALC: 26.9 % (ref 36–48)
HCT VFR BLD CALC: 27 % (ref 36–48)
HCT VFR BLD CALC: 27.5 % (ref 36–48)
HCT VFR BLD CALC: 29.4 % (ref 36–48)
HCT VFR BLD CALC: 29.7 % (ref 36–48)
HCT VFR BLD CALC: 30 % (ref 36–48)
HCT VFR BLD CALC: 30.4 % (ref 36–48)
HCT VFR BLD CALC: 30.9 % (ref 36–48)
HCT VFR BLD CALC: 31.1 % (ref 36–48)
HCT VFR BLD CALC: 33.5 % (ref 36–48)
HCT VFR BLD CALC: 33.6 % (ref 36–48)
HCT VFR BLD CALC: 33.9 % (ref 36–48)
HCT VFR BLD CALC: 34.5 % (ref 36–48)
HCT VFR BLD CALC: 35.3 % (ref 36–48)
HCT VFR BLD CALC: 35.8 % (ref 36–48)
HCT VFR BLD CALC: 37.2 % (ref 36–48)
HEMOGLOBIN: 10.1 G/DL (ref 12–16)
HEMOGLOBIN: 10.1 G/DL (ref 12–16)
HEMOGLOBIN: 10.4 G/DL (ref 12–16)
HEMOGLOBIN: 11 G/DL (ref 12–16)
HEMOGLOBIN: 11.1 G/DL (ref 12–16)
HEMOGLOBIN: 11.3 G/DL (ref 12–16)
HEMOGLOBIN: 11.3 G/DL (ref 12–16)
HEMOGLOBIN: 11.6 G/DL (ref 12–16)
HEMOGLOBIN: 11.7 G/DL (ref 12–16)
HEMOGLOBIN: 12.4 G/DL (ref 12–16)
HEMOGLOBIN: 7.8 G/DL (ref 12–16)
HEMOGLOBIN: 7.8 G/DL (ref 12–16)
HEMOGLOBIN: 7.9 G/DL (ref 12–16)
HEMOGLOBIN: 8 G/DL (ref 12–16)
HEMOGLOBIN: 8.4 G/DL (ref 12–16)
HEMOGLOBIN: 8.9 G/DL (ref 12–16)
HEMOGLOBIN: 9.1 G/DL (ref 12–16)
HEMOGLOBIN: 9.2 G/DL (ref 12–16)
HEMOGLOBIN: 9.6 G/DL (ref 12–16)
HEMOGLOBIN: 9.7 G/DL (ref 12–16)
HEMOGLOBIN: 9.8 G/DL (ref 12–16)
HEPATITIS B CORE IGM ANTIBODY: NORMAL
HEPATITIS B CORE TOTAL ANTIBODY: NEGATIVE
HEPATITIS B SURFACE ANTIGEN INTERPRETATION: NORMAL
HEPATITIS C ANTIBODY INTERPRETATION: NORMAL
IRON SATURATION: 13 % (ref 15–50)
IRON SATURATION: 16 % (ref 15–50)
IRON: 22 UG/DL (ref 37–145)
IRON: 29 UG/DL (ref 37–145)
LACTATE: 1.09 MMOL/L (ref 0.4–2)
LACTIC ACID: 1.5 MMOL/L (ref 0.4–2)
LIPASE: 29 U/L (ref 13–60)
LV EF: 58 %
LVEF MODALITY: NORMAL
LYMPHOCYTES ABSOLUTE: 0.4 K/UL (ref 1–5.1)
LYMPHOCYTES ABSOLUTE: 0.5 K/UL (ref 1–5.1)
LYMPHOCYTES ABSOLUTE: 0.6 K/UL (ref 1–5.1)
LYMPHOCYTES ABSOLUTE: 0.6 K/UL (ref 1–5.1)
LYMPHOCYTES ABSOLUTE: 0.7 K/UL (ref 1–5.1)
LYMPHOCYTES ABSOLUTE: 0.8 K/UL (ref 1–5.1)
LYMPHOCYTES ABSOLUTE: 0.9 K/UL (ref 1–5.1)
LYMPHOCYTES ABSOLUTE: 0.9 K/UL (ref 1–5.1)
LYMPHOCYTES ABSOLUTE: 1 K/UL (ref 1–5.1)
LYMPHOCYTES ABSOLUTE: 1.4 K/UL (ref 1–5.1)
LYMPHOCYTES RELATIVE PERCENT: 10.3 %
LYMPHOCYTES RELATIVE PERCENT: 11 %
LYMPHOCYTES RELATIVE PERCENT: 12 %
LYMPHOCYTES RELATIVE PERCENT: 4.6 %
LYMPHOCYTES RELATIVE PERCENT: 5 %
LYMPHOCYTES RELATIVE PERCENT: 5.6 %
LYMPHOCYTES RELATIVE PERCENT: 6.1 %
LYMPHOCYTES RELATIVE PERCENT: 6.6 %
LYMPHOCYTES RELATIVE PERCENT: 6.6 %
LYMPHOCYTES RELATIVE PERCENT: 8.3 %
LYMPHOCYTES RELATIVE PERCENT: 8.3 %
LYMPHOCYTES RELATIVE PERCENT: 9.7 %
LYMPHOCYTES, BODY FLUID: 40 %
MACROPHAGE FLUID: 3 %
MAGNESIUM: 2.2 MG/DL (ref 1.8–2.4)
MAGNESIUM: 2.4 MG/DL (ref 1.8–2.4)
MCH RBC QN AUTO: 31.8 PG (ref 26–34)
MCH RBC QN AUTO: 31.9 PG (ref 26–34)
MCH RBC QN AUTO: 32.3 PG (ref 26–34)
MCH RBC QN AUTO: 32.4 PG (ref 26–34)
MCH RBC QN AUTO: 32.5 PG (ref 26–34)
MCH RBC QN AUTO: 32.6 PG (ref 26–34)
MCH RBC QN AUTO: 32.6 PG (ref 26–34)
MCH RBC QN AUTO: 32.7 PG (ref 26–34)
MCH RBC QN AUTO: 32.8 PG (ref 26–34)
MCH RBC QN AUTO: 33 PG (ref 26–34)
MCH RBC QN AUTO: 33 PG (ref 26–34)
MCH RBC QN AUTO: 33.1 PG (ref 26–34)
MCH RBC QN AUTO: 33.2 PG (ref 26–34)
MCH RBC QN AUTO: 33.2 PG (ref 26–34)
MCH RBC QN AUTO: 33.8 PG (ref 26–34)
MCH RBC QN AUTO: 33.9 PG (ref 26–34)
MCH RBC QN AUTO: 34 PG (ref 26–34)
MCH RBC QN AUTO: 34.1 PG (ref 26–34)
MCH RBC QN AUTO: 34.3 PG (ref 26–34)
MCHC RBC AUTO-ENTMCNC: 32.3 G/DL (ref 31–36)
MCHC RBC AUTO-ENTMCNC: 32.5 G/DL (ref 31–36)
MCHC RBC AUTO-ENTMCNC: 32.6 G/DL (ref 31–36)
MCHC RBC AUTO-ENTMCNC: 32.7 G/DL (ref 31–36)
MCHC RBC AUTO-ENTMCNC: 32.7 G/DL (ref 31–36)
MCHC RBC AUTO-ENTMCNC: 32.8 G/DL (ref 31–36)
MCHC RBC AUTO-ENTMCNC: 32.8 G/DL (ref 31–36)
MCHC RBC AUTO-ENTMCNC: 32.9 G/DL (ref 31–36)
MCHC RBC AUTO-ENTMCNC: 33 G/DL (ref 31–36)
MCHC RBC AUTO-ENTMCNC: 33.3 G/DL (ref 31–36)
MCHC RBC AUTO-ENTMCNC: 33.5 G/DL (ref 31–36)
MCHC RBC AUTO-ENTMCNC: 33.6 G/DL (ref 31–36)
MCHC RBC AUTO-ENTMCNC: 33.7 G/DL (ref 31–36)
MCHC RBC AUTO-ENTMCNC: 33.8 G/DL (ref 31–36)
MCHC RBC AUTO-ENTMCNC: 33.9 G/DL (ref 31–36)
MCV RBC AUTO: 100.3 FL (ref 80–100)
MCV RBC AUTO: 100.4 FL (ref 80–100)
MCV RBC AUTO: 100.5 FL (ref 80–100)
MCV RBC AUTO: 100.6 FL (ref 80–100)
MCV RBC AUTO: 100.7 FL (ref 80–100)
MCV RBC AUTO: 101.1 FL (ref 80–100)
MCV RBC AUTO: 101.4 FL (ref 80–100)
MCV RBC AUTO: 101.6 FL (ref 80–100)
MCV RBC AUTO: 102.4 FL (ref 80–100)
MCV RBC AUTO: 97.9 FL (ref 80–100)
MCV RBC AUTO: 97.9 FL (ref 80–100)
MCV RBC AUTO: 98.1 FL (ref 80–100)
MCV RBC AUTO: 98.3 FL (ref 80–100)
MCV RBC AUTO: 98.4 FL (ref 80–100)
MCV RBC AUTO: 98.8 FL (ref 80–100)
MCV RBC AUTO: 98.8 FL (ref 80–100)
MCV RBC AUTO: 98.9 FL (ref 80–100)
MCV RBC AUTO: 99.7 FL (ref 80–100)
MCV RBC AUTO: 99.8 FL (ref 80–100)
MONOCYTE, FLUID: 5 %
MONOCYTES ABSOLUTE: 0.9 K/UL (ref 0–1.3)
MONOCYTES ABSOLUTE: 1 K/UL (ref 0–1.3)
MONOCYTES ABSOLUTE: 1.1 K/UL (ref 0–1.3)
MONOCYTES ABSOLUTE: 1.2 K/UL (ref 0–1.3)
MONOCYTES ABSOLUTE: 1.2 K/UL (ref 0–1.3)
MONOCYTES ABSOLUTE: 1.3 K/UL (ref 0–1.3)
MONOCYTES ABSOLUTE: 1.3 K/UL (ref 0–1.3)
MONOCYTES ABSOLUTE: 1.4 K/UL (ref 0–1.3)
MONOCYTES RELATIVE PERCENT: 10.9 %
MONOCYTES RELATIVE PERCENT: 11.2 %
MONOCYTES RELATIVE PERCENT: 11.4 %
MONOCYTES RELATIVE PERCENT: 12.5 %
MONOCYTES RELATIVE PERCENT: 13 %
MONOCYTES RELATIVE PERCENT: 13.7 %
MONOCYTES RELATIVE PERCENT: 14.9 %
MONOCYTES RELATIVE PERCENT: 16.5 %
MONOCYTES RELATIVE PERCENT: 17.7 %
MONOCYTES RELATIVE PERCENT: 6.5 %
MONOCYTES RELATIVE PERCENT: 8 %
MONOCYTES RELATIVE PERCENT: 9 %
MRSA SCREEN RT-PCR: ABNORMAL
MRSA SCREEN RT-PCR: ABNORMAL
NEUTROPHIL, FLUID: 55 %
NEUTROPHIL, FLUID: 96 %
NEUTROPHILS ABSOLUTE: 10.3 K/UL (ref 1.7–7.7)
NEUTROPHILS ABSOLUTE: 11.9 K/UL (ref 1.7–7.7)
NEUTROPHILS ABSOLUTE: 4.9 K/UL (ref 1.7–7.7)
NEUTROPHILS ABSOLUTE: 5.4 K/UL (ref 1.7–7.7)
NEUTROPHILS ABSOLUTE: 5.9 K/UL (ref 1.7–7.7)
NEUTROPHILS ABSOLUTE: 6.2 K/UL (ref 1.7–7.7)
NEUTROPHILS ABSOLUTE: 6.5 K/UL (ref 1.7–7.7)
NEUTROPHILS ABSOLUTE: 7.2 K/UL (ref 1.7–7.7)
NEUTROPHILS ABSOLUTE: 7.7 K/UL (ref 1.7–7.7)
NEUTROPHILS ABSOLUTE: 8.5 K/UL (ref 1.7–7.7)
NEUTROPHILS ABSOLUTE: 9 K/UL (ref 1.7–7.7)
NEUTROPHILS ABSOLUTE: 9.7 K/UL (ref 1.7–7.7)
NEUTROPHILS RELATIVE PERCENT: 70.1 %
NEUTROPHILS RELATIVE PERCENT: 71.2 %
NEUTROPHILS RELATIVE PERCENT: 73.4 %
NEUTROPHILS RELATIVE PERCENT: 74.2 %
NEUTROPHILS RELATIVE PERCENT: 75 %
NEUTROPHILS RELATIVE PERCENT: 78.9 %
NEUTROPHILS RELATIVE PERCENT: 80 %
NEUTROPHILS RELATIVE PERCENT: 80.1 %
NEUTROPHILS RELATIVE PERCENT: 81 %
NEUTROPHILS RELATIVE PERCENT: 82.3 %
NEUTROPHILS RELATIVE PERCENT: 83 %
NEUTROPHILS RELATIVE PERCENT: 84.6 %
NUCLEATED CELLS FLUID: NORMAL /CUMM
NUCLEATED CELLS FLUID: NORMAL /CUMM
NUMBER OF CELLS COUNTED FLUID: 100
NUMBER OF CELLS COUNTED FLUID: 100
O2 SAT, VEN: 44 %
ORGANISM: ABNORMAL
PCO2, VEN: 37.1 MM HG (ref 40–50)
PDW BLD-RTO: 13.9 % (ref 12.4–15.4)
PDW BLD-RTO: 14.1 % (ref 12.4–15.4)
PDW BLD-RTO: 14.1 % (ref 12.4–15.4)
PDW BLD-RTO: 14.4 % (ref 12.4–15.4)
PDW BLD-RTO: 14.5 % (ref 12.4–15.4)
PDW BLD-RTO: 14.7 % (ref 12.4–15.4)
PDW BLD-RTO: 14.7 % (ref 12.4–15.4)
PDW BLD-RTO: 15.3 % (ref 12.4–15.4)
PDW BLD-RTO: 15.4 % (ref 12.4–15.4)
PDW BLD-RTO: 15.5 % (ref 12.4–15.4)
PDW BLD-RTO: 15.6 % (ref 12.4–15.4)
PDW BLD-RTO: 15.9 % (ref 12.4–15.4)
PDW BLD-RTO: 16.2 % (ref 12.4–15.4)
PDW BLD-RTO: 16.4 % (ref 12.4–15.4)
PDW BLD-RTO: 16.8 % (ref 12.4–15.4)
PERFORMED ON: ABNORMAL
PERFORMED ON: NORMAL
PH VENOUS: 7.51 (ref 7.35–7.45)
PHOSPHORUS: 10.9 MG/DL (ref 2.5–4.9)
PHOSPHORUS: 4.3 MG/DL (ref 2.5–4.9)
PHOSPHORUS: 5.1 MG/DL (ref 2.5–4.9)
PHOSPHORUS: 5.7 MG/DL (ref 2.5–4.9)
PHOSPHORUS: 5.8 MG/DL (ref 2.5–4.9)
PHOSPHORUS: 5.9 MG/DL (ref 2.5–4.9)
PHOSPHORUS: 6.3 MG/DL (ref 2.5–4.9)
PHOSPHORUS: 6.5 MG/DL (ref 2.5–4.9)
PHOSPHORUS: 6.7 MG/DL (ref 2.5–4.9)
PHOSPHORUS: 6.9 MG/DL (ref 2.5–4.9)
PHOSPHORUS: 7.1 MG/DL (ref 2.5–4.9)
PHOSPHORUS: 7.1 MG/DL (ref 2.5–4.9)
PHOSPHORUS: 7.3 MG/DL (ref 2.5–4.9)
PHOSPHORUS: 8 MG/DL (ref 2.5–4.9)
PHOSPHORUS: 8.7 MG/DL (ref 2.5–4.9)
PHOSPHORUS: 8.9 MG/DL (ref 2.5–4.9)
PHOSPHORUS: 9.1 MG/DL (ref 2.5–4.9)
PLATELET # BLD: 305 K/UL (ref 135–450)
PLATELET # BLD: 315 K/UL (ref 135–450)
PLATELET # BLD: 322 K/UL (ref 135–450)
PLATELET # BLD: 333 K/UL (ref 135–450)
PLATELET # BLD: 346 K/UL (ref 135–450)
PLATELET # BLD: 352 K/UL (ref 135–450)
PLATELET # BLD: 361 K/UL (ref 135–450)
PLATELET # BLD: 368 K/UL (ref 135–450)
PLATELET # BLD: 392 K/UL (ref 135–450)
PLATELET # BLD: 401 K/UL (ref 135–450)
PLATELET # BLD: 427 K/UL (ref 135–450)
PLATELET # BLD: 453 K/UL (ref 135–450)
PLATELET # BLD: 463 K/UL (ref 135–450)
PLATELET # BLD: 472 K/UL (ref 135–450)
PLATELET # BLD: 483 K/UL (ref 135–450)
PLATELET # BLD: 503 K/UL (ref 135–450)
PLATELET # BLD: 507 K/UL (ref 135–450)
PLATELET # BLD: 509 K/UL (ref 135–450)
PLATELET # BLD: 530 K/UL (ref 135–450)
PMV BLD AUTO: 7.2 FL (ref 5–10.5)
PMV BLD AUTO: 7.2 FL (ref 5–10.5)
PMV BLD AUTO: 7.3 FL (ref 5–10.5)
PMV BLD AUTO: 7.4 FL (ref 5–10.5)
PMV BLD AUTO: 7.5 FL (ref 5–10.5)
PMV BLD AUTO: 7.6 FL (ref 5–10.5)
PMV BLD AUTO: 7.7 FL (ref 5–10.5)
PMV BLD AUTO: 7.8 FL (ref 5–10.5)
PMV BLD AUTO: 7.9 FL (ref 5–10.5)
PMV BLD AUTO: 8.1 FL (ref 5–10.5)
PO2, VEN: 22 MM HG
POC SAMPLE TYPE: ABNORMAL
POTASSIUM REFLEX MAGNESIUM: 3.8 MMOL/L (ref 3.5–5.1)
POTASSIUM REFLEX MAGNESIUM: 4.3 MMOL/L (ref 3.5–5.1)
POTASSIUM REFLEX MAGNESIUM: 4.3 MMOL/L (ref 3.5–5.1)
POTASSIUM REFLEX MAGNESIUM: 4.7 MMOL/L (ref 3.5–5.1)
POTASSIUM REFLEX MAGNESIUM: 5.2 MMOL/L (ref 3.5–5.1)
POTASSIUM SERPL-SCNC: 4.3 MMOL/L (ref 3.5–5.1)
POTASSIUM SERPL-SCNC: 4.5 MMOL/L (ref 3.5–5.1)
POTASSIUM SERPL-SCNC: 4.6 MMOL/L (ref 3.5–5.1)
POTASSIUM SERPL-SCNC: 4.7 MMOL/L (ref 3.5–5.1)
POTASSIUM SERPL-SCNC: 4.8 MMOL/L (ref 3.5–5.1)
POTASSIUM SERPL-SCNC: 4.9 MMOL/L (ref 3.5–5.1)
POTASSIUM SERPL-SCNC: 5 MMOL/L (ref 3.5–5.1)
POTASSIUM SERPL-SCNC: 5.1 MMOL/L (ref 3.5–5.1)
POTASSIUM SERPL-SCNC: 5.4 MMOL/L (ref 3.5–5.1)
POTASSIUM SERPL-SCNC: 5.4 MMOL/L (ref 3.5–5.1)
POTASSIUM SERPL-SCNC: 5.5 MMOL/L (ref 3.5–5.1)
POTASSIUM SERPL-SCNC: 5.5 MMOL/L (ref 3.5–5.1)
POTASSIUM SERPL-SCNC: 5.8 MMOL/L (ref 3.5–5.1)
POTASSIUM SERPL-SCNC: 6 MMOL/L (ref 3.5–5.1)
PREALBUMIN: 14.2 MG/DL (ref 20–40)
RBC # BLD: 2.28 M/UL (ref 4–5.2)
RBC # BLD: 2.3 M/UL (ref 4–5.2)
RBC # BLD: 2.36 M/UL (ref 4–5.2)
RBC # BLD: 2.68 M/UL (ref 4–5.2)
RBC # BLD: 2.69 M/UL (ref 4–5.2)
RBC # BLD: 2.75 M/UL (ref 4–5.2)
RBC # BLD: 2.93 M/UL (ref 4–5.2)
RBC # BLD: 2.96 M/UL (ref 4–5.2)
RBC # BLD: 2.97 M/UL (ref 4–5.2)
RBC # BLD: 3.1 M/UL (ref 4–5.2)
RBC # BLD: 3.13 M/UL (ref 4–5.2)
RBC # BLD: 3.17 M/UL (ref 4–5.2)
RBC # BLD: 3.4 M/UL (ref 4–5.2)
RBC # BLD: 3.42 M/UL (ref 4–5.2)
RBC # BLD: 3.45 M/UL (ref 4–5.2)
RBC # BLD: 3.51 M/UL (ref 4–5.2)
RBC # BLD: 3.58 M/UL (ref 4–5.2)
RBC # BLD: 3.59 M/UL (ref 4–5.2)
RBC # BLD: 3.64 M/UL (ref 4–5.2)
RBC # BLD: NORMAL 10*6/UL
RBC FLUID: NORMAL /CUMM
RBC FLUID: NORMAL /CUMM
SEDIMENTATION RATE, ERYTHROCYTE: 108 MM/HR (ref 0–30)
SEDIMENTATION RATE, ERYTHROCYTE: 119 MM/HR (ref 0–30)
SEDIMENTATION RATE, ERYTHROCYTE: >120 MM/HR (ref 0–30)
SEDIMENTATION RATE, ERYTHROCYTE: >120 MM/HR (ref 0–30)
SLIDE REVIEW: ABNORMAL
SODIUM BLD-SCNC: 132 MMOL/L (ref 136–145)
SODIUM BLD-SCNC: 133 MMOL/L (ref 136–145)
SODIUM BLD-SCNC: 133 MMOL/L (ref 136–145)
SODIUM BLD-SCNC: 134 MMOL/L (ref 136–145)
SODIUM BLD-SCNC: 135 MMOL/L (ref 136–145)
SODIUM BLD-SCNC: 136 MMOL/L (ref 136–145)
SODIUM BLD-SCNC: 137 MMOL/L (ref 136–145)
TCO2 CALC VENOUS: 31 MMOL/L
TOTAL IRON BINDING CAPACITY: 173 UG/DL (ref 260–445)
TOTAL IRON BINDING CAPACITY: 178 UG/DL (ref 260–445)
TOTAL PROTEIN: 6.3 G/DL (ref 6.4–8.2)
TOTAL PROTEIN: 7.5 G/DL (ref 6.4–8.2)
VANCOMYCIN RANDOM: 10.1 UG/ML
VANCOMYCIN RANDOM: 10.6 UG/ML
VANCOMYCIN RANDOM: 12.3 UG/ML
VANCOMYCIN RANDOM: 15.8 UG/ML
VANCOMYCIN RANDOM: 16 UG/ML
VANCOMYCIN RANDOM: 16.7 UG/ML
VANCOMYCIN RANDOM: 16.9 UG/ML
VANCOMYCIN RANDOM: 17.9 UG/ML
VANCOMYCIN RANDOM: 22.9 UG/ML
VITAMIN B-12: 486 PG/ML (ref 211–911)
WBC # BLD: 10.8 K/UL (ref 4–11)
WBC # BLD: 10.9 K/UL (ref 4–11)
WBC # BLD: 11 K/UL (ref 4–11)
WBC # BLD: 11.8 K/UL (ref 4–11)
WBC # BLD: 11.8 K/UL (ref 4–11)
WBC # BLD: 12.7 K/UL (ref 4–11)
WBC # BLD: 12.8 K/UL (ref 4–11)
WBC # BLD: 14.1 K/UL (ref 4–11)
WBC # BLD: 16.3 K/UL (ref 4–11)
WBC # BLD: 6.9 K/UL (ref 4–11)
WBC # BLD: 7.7 K/UL (ref 4–11)
WBC # BLD: 8 K/UL (ref 4–11)
WBC # BLD: 8.1 K/UL (ref 4–11)
WBC # BLD: 8.3 K/UL (ref 4–11)
WBC # BLD: 8.8 K/UL (ref 4–11)
WBC # BLD: 8.9 K/UL (ref 4–11)
WBC # BLD: 9 K/UL (ref 4–11)
WBC # BLD: 9.1 K/UL (ref 4–11)
WBC # BLD: 9.6 K/UL (ref 4–11)
WOUND/ABSCESS: ABNORMAL
WOUND/ABSCESS: NORMAL
WOUND/ABSCESS: NORMAL

## 2019-01-01 PROCEDURE — 6370000000 HC RX 637 (ALT 250 FOR IP): Performed by: ORTHOPAEDIC SURGERY

## 2019-01-01 PROCEDURE — 1200000000 HC SEMI PRIVATE

## 2019-01-01 PROCEDURE — 36415 COLL VENOUS BLD VENIPUNCTURE: CPT

## 2019-01-01 PROCEDURE — 97530 THERAPEUTIC ACTIVITIES: CPT

## 2019-01-01 PROCEDURE — 73700 CT LOWER EXTREMITY W/O DYE: CPT

## 2019-01-01 PROCEDURE — 80069 RENAL FUNCTION PANEL: CPT

## 2019-01-01 PROCEDURE — 6360000002 HC RX W HCPCS: Performed by: STUDENT IN AN ORGANIZED HEALTH CARE EDUCATION/TRAINING PROGRAM

## 2019-01-01 PROCEDURE — 87116 MYCOBACTERIA CULTURE: CPT

## 2019-01-01 PROCEDURE — 99024 POSTOP FOLLOW-UP VISIT: CPT | Performed by: ORTHOPAEDIC SURGERY

## 2019-01-01 PROCEDURE — 2580000003 HC RX 258: Performed by: ORTHOPAEDIC SURGERY

## 2019-01-01 PROCEDURE — 85018 HEMOGLOBIN: CPT

## 2019-01-01 PROCEDURE — 6360000002 HC RX W HCPCS: Performed by: ORTHOPAEDIC SURGERY

## 2019-01-01 PROCEDURE — 6370000000 HC RX 637 (ALT 250 FOR IP): Performed by: STUDENT IN AN ORGANIZED HEALTH CARE EDUCATION/TRAINING PROGRAM

## 2019-01-01 PROCEDURE — 11042 DBRDMT SUBQ TIS 1ST 20SQCM/<: CPT

## 2019-01-01 PROCEDURE — 6360000002 HC RX W HCPCS: Performed by: INTERNAL MEDICINE

## 2019-01-01 PROCEDURE — 3700000000 HC ANESTHESIA ATTENDED CARE: Performed by: ORTHOPAEDIC SURGERY

## 2019-01-01 PROCEDURE — 97535 SELF CARE MNGMENT TRAINING: CPT

## 2019-01-01 PROCEDURE — 84100 ASSAY OF PHOSPHORUS: CPT

## 2019-01-01 PROCEDURE — 2500000003 HC RX 250 WO HCPCS

## 2019-01-01 PROCEDURE — 86704 HEP B CORE ANTIBODY TOTAL: CPT

## 2019-01-01 PROCEDURE — 85027 COMPLETE CBC AUTOMATED: CPT

## 2019-01-01 PROCEDURE — 85025 COMPLETE CBC W/AUTO DIFF WBC: CPT

## 2019-01-01 PROCEDURE — 80202 ASSAY OF VANCOMYCIN: CPT

## 2019-01-01 PROCEDURE — 2580000003 HC RX 258: Performed by: INTERNAL MEDICINE

## 2019-01-01 PROCEDURE — 6360000002 HC RX W HCPCS

## 2019-01-01 PROCEDURE — 90935 HEMODIALYSIS ONE EVALUATION: CPT

## 2019-01-01 PROCEDURE — 99233 SBSQ HOSP IP/OBS HIGH 50: CPT | Performed by: INTERNAL MEDICINE

## 2019-01-01 PROCEDURE — 6370000000 HC RX 637 (ALT 250 FOR IP): Performed by: INTERNAL MEDICINE

## 2019-01-01 PROCEDURE — 2580000003 HC RX 258: Performed by: STUDENT IN AN ORGANIZED HEALTH CARE EDUCATION/TRAINING PROGRAM

## 2019-01-01 PROCEDURE — 75625 CONTRAST EXAM ABDOMINL AORTA: CPT

## 2019-01-01 PROCEDURE — 99232 SBSQ HOSP IP/OBS MODERATE 35: CPT | Performed by: INTERNAL MEDICINE

## 2019-01-01 PROCEDURE — 4500000024 HC ED LEVEL 4 PROCEDURE

## 2019-01-01 PROCEDURE — 85652 RBC SED RATE AUTOMATED: CPT

## 2019-01-01 PROCEDURE — 96374 THER/PROPH/DIAG INJ IV PUSH: CPT

## 2019-01-01 PROCEDURE — 2580000003 HC RX 258

## 2019-01-01 PROCEDURE — 74019 RADEX ABDOMEN 2 VIEWS: CPT

## 2019-01-01 PROCEDURE — 6360000002 HC RX W HCPCS: Performed by: ANESTHESIOLOGY

## 2019-01-01 PROCEDURE — 80048 BASIC METABOLIC PNL TOTAL CA: CPT

## 2019-01-01 PROCEDURE — 3600000004 HC SURGERY LEVEL 4 BASE: Performed by: ORTHOPAEDIC SURGERY

## 2019-01-01 PROCEDURE — 97162 PT EVAL MOD COMPLEX 30 MIN: CPT

## 2019-01-01 PROCEDURE — 7100000000 HC PACU RECOVERY - FIRST 15 MIN: Performed by: ORTHOPAEDIC SURGERY

## 2019-01-01 PROCEDURE — 99222 1ST HOSP IP/OBS MODERATE 55: CPT | Performed by: ORTHOPAEDIC SURGERY

## 2019-01-01 PROCEDURE — C9113 INJ PANTOPRAZOLE SODIUM, VIA: HCPCS | Performed by: HOSPITALIST

## 2019-01-01 PROCEDURE — C1760 CLOSURE DEV, VASC: HCPCS

## 2019-01-01 PROCEDURE — 99212 OFFICE O/P EST SF 10 MIN: CPT

## 2019-01-01 PROCEDURE — 73560 X-RAY EXAM OF KNEE 1 OR 2: CPT

## 2019-01-01 PROCEDURE — 6360000002 HC RX W HCPCS: Performed by: EMERGENCY MEDICINE

## 2019-01-01 PROCEDURE — 84132 ASSAY OF SERUM POTASSIUM: CPT

## 2019-01-01 PROCEDURE — 97116 GAIT TRAINING THERAPY: CPT

## 2019-01-01 PROCEDURE — 99213 OFFICE O/P EST LOW 20 MIN: CPT | Performed by: NURSE PRACTITIONER

## 2019-01-01 PROCEDURE — 87206 SMEAR FLUORESCENT/ACID STAI: CPT

## 2019-01-01 PROCEDURE — 96375 TX/PRO/DX INJ NEW DRUG ADDON: CPT

## 2019-01-01 PROCEDURE — 99213 OFFICE O/P EST LOW 20 MIN: CPT

## 2019-01-01 PROCEDURE — C1769 GUIDE WIRE: HCPCS

## 2019-01-01 PROCEDURE — 93971 EXTREMITY STUDY: CPT

## 2019-01-01 PROCEDURE — 84134 ASSAY OF PREALBUMIN: CPT

## 2019-01-01 PROCEDURE — 3430000000 HC RX DIAGNOSTIC RADIOPHARMACEUTICAL: Performed by: PODIATRIST

## 2019-01-01 PROCEDURE — 85014 HEMATOCRIT: CPT

## 2019-01-01 PROCEDURE — 87015 SPECIMEN INFECT AGNT CONCNTJ: CPT

## 2019-01-01 PROCEDURE — 3600000014 HC SURGERY LEVEL 4 ADDTL 15MIN: Performed by: ORTHOPAEDIC SURGERY

## 2019-01-01 PROCEDURE — 75625 CONTRAST EXAM ABDOMINL AORTA: CPT | Performed by: SURGERY

## 2019-01-01 PROCEDURE — 3700000001 HC ADD 15 MINUTES (ANESTHESIA): Performed by: ORTHOPAEDIC SURGERY

## 2019-01-01 PROCEDURE — 99213 OFFICE O/P EST LOW 20 MIN: CPT | Performed by: SURGERY

## 2019-01-01 PROCEDURE — 93005 ELECTROCARDIOGRAM TRACING: CPT | Performed by: INTERNAL MEDICINE

## 2019-01-01 PROCEDURE — 88304 TISSUE EXAM BY PATHOLOGIST: CPT

## 2019-01-01 PROCEDURE — 6370000000 HC RX 637 (ALT 250 FOR IP): Performed by: SURGERY

## 2019-01-01 PROCEDURE — 99232 SBSQ HOSP IP/OBS MODERATE 35: CPT | Performed by: SURGERY

## 2019-01-01 PROCEDURE — 73590 X-RAY EXAM OF LOWER LEG: CPT

## 2019-01-01 PROCEDURE — 97166 OT EVAL MOD COMPLEX 45 MIN: CPT

## 2019-01-01 PROCEDURE — 93010 ELECTROCARDIOGRAM REPORT: CPT | Performed by: INTERNAL MEDICINE

## 2019-01-01 PROCEDURE — 99222 1ST HOSP IP/OBS MODERATE 55: CPT | Performed by: INTERNAL MEDICINE

## 2019-01-01 PROCEDURE — 99153 MOD SED SAME PHYS/QHP EA: CPT

## 2019-01-01 PROCEDURE — 93925 LOWER EXTREMITY STUDY: CPT

## 2019-01-01 PROCEDURE — 0S9D3ZZ DRAINAGE OF LEFT KNEE JOINT, PERCUTANEOUS APPROACH: ICD-10-PCS | Performed by: STUDENT IN AN ORGANIZED HEALTH CARE EDUCATION/TRAINING PROGRAM

## 2019-01-01 PROCEDURE — 6370000000 HC RX 637 (ALT 250 FOR IP): Performed by: NURSE PRACTITIONER

## 2019-01-01 PROCEDURE — 5A1D70Z PERFORMANCE OF URINARY FILTRATION, INTERMITTENT, LESS THAN 6 HOURS PER DAY: ICD-10-PCS | Performed by: INTERNAL MEDICINE

## 2019-01-01 PROCEDURE — 80074 ACUTE HEPATITIS PANEL: CPT

## 2019-01-01 PROCEDURE — 86140 C-REACTIVE PROTEIN: CPT

## 2019-01-01 PROCEDURE — 75716 ARTERY X-RAYS ARMS/LEGS: CPT | Performed by: SURGERY

## 2019-01-01 PROCEDURE — 2500000003 HC RX 250 WO HCPCS: Performed by: STUDENT IN AN ORGANIZED HEALTH CARE EDUCATION/TRAINING PROGRAM

## 2019-01-01 PROCEDURE — 89051 BODY FLUID CELL COUNT: CPT

## 2019-01-01 PROCEDURE — 7100000001 HC PACU RECOVERY - ADDTL 15 MIN: Performed by: ORTHOPAEDIC SURGERY

## 2019-01-01 PROCEDURE — 86850 RBC ANTIBODY SCREEN: CPT

## 2019-01-01 PROCEDURE — 97165 OT EVAL LOW COMPLEX 30 MIN: CPT

## 2019-01-01 PROCEDURE — 94761 N-INVAS EAR/PLS OXIMETRY MLT: CPT

## 2019-01-01 PROCEDURE — 2709999900 HC NON-CHARGEABLE SUPPLY: Performed by: ORTHOPAEDIC SURGERY

## 2019-01-01 PROCEDURE — 6360000004 HC RX CONTRAST MEDICATION: Performed by: SURGERY

## 2019-01-01 PROCEDURE — 20610 DRAIN/INJ JOINT/BURSA W/O US: CPT | Performed by: ORTHOPAEDIC SURGERY

## 2019-01-01 PROCEDURE — C1729 CATH, DRAINAGE: HCPCS | Performed by: ORTHOPAEDIC SURGERY

## 2019-01-01 PROCEDURE — 99285 EMERGENCY DEPT VISIT HI MDM: CPT

## 2019-01-01 PROCEDURE — 27310 EXPLORATION OF KNEE JOINT: CPT | Performed by: ORTHOPAEDIC SURGERY

## 2019-01-01 PROCEDURE — 87205 SMEAR GRAM STAIN: CPT

## 2019-01-01 PROCEDURE — 82607 VITAMIN B-12: CPT

## 2019-01-01 PROCEDURE — 36246 INS CATH ABD/L-EXT ART 2ND: CPT

## 2019-01-01 PROCEDURE — 2700000000 HC OXYGEN THERAPY PER DAY

## 2019-01-01 PROCEDURE — 6370000000 HC RX 637 (ALT 250 FOR IP): Performed by: SPECIALIST

## 2019-01-01 PROCEDURE — 99223 1ST HOSP IP/OBS HIGH 75: CPT | Performed by: INTERNAL MEDICINE

## 2019-01-01 PROCEDURE — 87102 FUNGUS ISOLATION CULTURE: CPT

## 2019-01-01 PROCEDURE — 97110 THERAPEUTIC EXERCISES: CPT

## 2019-01-01 PROCEDURE — 83735 ASSAY OF MAGNESIUM: CPT

## 2019-01-01 PROCEDURE — 6370000000 HC RX 637 (ALT 250 FOR IP): Performed by: PODIATRIST

## 2019-01-01 PROCEDURE — 83605 ASSAY OF LACTIC ACID: CPT

## 2019-01-01 PROCEDURE — 74176 CT ABD & PELVIS W/O CONTRAST: CPT

## 2019-01-01 PROCEDURE — 75716 ARTERY X-RAYS ARMS/LEGS: CPT

## 2019-01-01 PROCEDURE — 87077 CULTURE AEROBIC IDENTIFY: CPT

## 2019-01-01 PROCEDURE — 11055 PARING/CUTG B9 HYPRKER LES 1: CPT

## 2019-01-01 PROCEDURE — 96376 TX/PRO/DX INJ SAME DRUG ADON: CPT

## 2019-01-01 PROCEDURE — 87075 CULTR BACTERIA EXCEPT BLOOD: CPT

## 2019-01-01 PROCEDURE — 83690 ASSAY OF LIPASE: CPT

## 2019-01-01 PROCEDURE — 87070 CULTURE OTHR SPECIMN AEROBIC: CPT

## 2019-01-01 PROCEDURE — 82746 ASSAY OF FOLIC ACID SERUM: CPT

## 2019-01-01 PROCEDURE — 87641 MR-STAPH DNA AMP PROBE: CPT

## 2019-01-01 PROCEDURE — 99152 MOD SED SAME PHYS/QHP 5/>YRS: CPT

## 2019-01-01 PROCEDURE — 82803 BLOOD GASES ANY COMBINATION: CPT

## 2019-01-01 PROCEDURE — 0SBD0ZZ EXCISION OF LEFT KNEE JOINT, OPEN APPROACH: ICD-10-PCS | Performed by: FAMILY MEDICINE

## 2019-01-01 PROCEDURE — 0HBNXZZ EXCISION OF LEFT FOOT SKIN, EXTERNAL APPROACH: ICD-10-PCS | Performed by: ORTHOPAEDIC SURGERY

## 2019-01-01 PROCEDURE — 6360000002 HC RX W HCPCS: Performed by: NURSE ANESTHETIST, CERTIFIED REGISTERED

## 2019-01-01 PROCEDURE — 80076 HEPATIC FUNCTION PANEL: CPT

## 2019-01-01 PROCEDURE — 2500000003 HC RX 250 WO HCPCS: Performed by: ORTHOPAEDIC SURGERY

## 2019-01-01 PROCEDURE — 87076 CULTURE ANAEROBE IDENT EACH: CPT

## 2019-01-01 PROCEDURE — 27614 BIOPSY LOWER LEG SOFT TISSUE: CPT | Performed by: ORTHOPAEDIC SURGERY

## 2019-01-01 PROCEDURE — 73630 X-RAY EXAM OF FOOT: CPT

## 2019-01-01 PROCEDURE — 0JBR0ZZ EXCISION OF LEFT FOOT SUBCUTANEOUS TISSUE AND FASCIA, OPEN APPROACH: ICD-10-PCS | Performed by: PODIATRIST

## 2019-01-01 PROCEDURE — 78805 NM INFLAMMATORY WBC LIMITED W CERETEC: CPT

## 2019-01-01 PROCEDURE — 2580000003 HC RX 258: Performed by: ANESTHESIOLOGY

## 2019-01-01 PROCEDURE — 6360000002 HC RX W HCPCS: Performed by: HOSPITALIST

## 2019-01-01 PROCEDURE — 97597 DBRDMT OPN WND 1ST 20 CM/<: CPT | Performed by: SPECIALIST

## 2019-01-01 PROCEDURE — 93923 UPR/LXTR ART STDY 3+ LVLS: CPT

## 2019-01-01 PROCEDURE — 99221 1ST HOSP IP/OBS SF/LOW 40: CPT | Performed by: SURGERY

## 2019-01-01 PROCEDURE — 83550 IRON BINDING TEST: CPT

## 2019-01-01 PROCEDURE — 86900 BLOOD TYPING SEROLOGIC ABO: CPT

## 2019-01-01 PROCEDURE — 87040 BLOOD CULTURE FOR BACTERIA: CPT

## 2019-01-01 PROCEDURE — 99213 OFFICE O/P EST LOW 20 MIN: CPT | Performed by: ORTHOPAEDIC SURGERY

## 2019-01-01 PROCEDURE — A9569 TECHNETIUM TC-99M AUTO WBC: HCPCS | Performed by: PODIATRIST

## 2019-01-01 PROCEDURE — 99284 EMERGENCY DEPT VISIT MOD MDM: CPT

## 2019-01-01 PROCEDURE — 0J9P0ZZ DRAINAGE OF LEFT LOWER LEG SUBCUTANEOUS TISSUE AND FASCIA, OPEN APPROACH: ICD-10-PCS | Performed by: ORTHOPAEDIC SURGERY

## 2019-01-01 PROCEDURE — C1894 INTRO/SHEATH, NON-LASER: HCPCS

## 2019-01-01 PROCEDURE — 94150 VITAL CAPACITY TEST: CPT

## 2019-01-01 PROCEDURE — 82728 ASSAY OF FERRITIN: CPT

## 2019-01-01 PROCEDURE — 86706 HEP B SURFACE ANTIBODY: CPT

## 2019-01-01 PROCEDURE — 6360000002 HC RX W HCPCS: Performed by: PODIATRIST

## 2019-01-01 PROCEDURE — 93005 ELECTROCARDIOGRAM TRACING: CPT | Performed by: STUDENT IN AN ORGANIZED HEALTH CARE EDUCATION/TRAINING PROGRAM

## 2019-01-01 PROCEDURE — 0JBP0ZZ EXCISION OF LEFT LOWER LEG SUBCUTANEOUS TISSUE AND FASCIA, OPEN APPROACH: ICD-10-PCS | Performed by: ORTHOPAEDIC SURGERY

## 2019-01-01 PROCEDURE — 99214 OFFICE O/P EST MOD 30 MIN: CPT | Performed by: SPECIALIST

## 2019-01-01 PROCEDURE — 83036 HEMOGLOBIN GLYCOSYLATED A1C: CPT

## 2019-01-01 PROCEDURE — 99231 SBSQ HOSP IP/OBS SF/LOW 25: CPT | Performed by: SURGERY

## 2019-01-01 PROCEDURE — 87186 SC STD MICRODIL/AGAR DIL: CPT

## 2019-01-01 PROCEDURE — 93306 TTE W/DOPPLER COMPLETE: CPT

## 2019-01-01 PROCEDURE — 2580000003 HC RX 258: Performed by: NURSE ANESTHETIST, CERTIFIED REGISTERED

## 2019-01-01 PROCEDURE — 27603 DRAIN LOWER LEG LESION: CPT | Performed by: ORTHOPAEDIC SURGERY

## 2019-01-01 PROCEDURE — 2709999900 HC NON-CHARGEABLE SUPPLY

## 2019-01-01 PROCEDURE — 86901 BLOOD TYPING SEROLOGIC RH(D): CPT

## 2019-01-01 PROCEDURE — 75774 ARTERY X-RAY EACH VESSEL: CPT | Performed by: SURGERY

## 2019-01-01 PROCEDURE — 99204 OFFICE O/P NEW MOD 45 MIN: CPT | Performed by: SURGERY

## 2019-01-01 PROCEDURE — 11042 DBRDMT SUBQ TIS 1ST 20SQCM/<: CPT | Performed by: SPECIALIST

## 2019-01-01 PROCEDURE — 83540 ASSAY OF IRON: CPT

## 2019-01-01 PROCEDURE — 36247 INS CATH ABD/L-EXT ART 3RD: CPT | Performed by: SURGERY

## 2019-01-01 RX ORDER — INSULIN LISPRO 100 [IU]/ML
0-3 INJECTION, SOLUTION INTRAVENOUS; SUBCUTANEOUS NIGHTLY
Status: DISCONTINUED | OUTPATIENT
Start: 2019-01-01 | End: 2019-01-01 | Stop reason: HOSPADM

## 2019-01-01 RX ORDER — AMLODIPINE BESYLATE 10 MG/1
10 TABLET ORAL DAILY
Status: DISCONTINUED | OUTPATIENT
Start: 2019-01-01 | End: 2019-01-01 | Stop reason: HOSPADM

## 2019-01-01 RX ORDER — SODIUM CHLORIDE 9 MG/ML
INJECTION, SOLUTION INTRAVENOUS CONTINUOUS
Status: DISCONTINUED | OUTPATIENT
Start: 2019-01-01 | End: 2019-01-01 | Stop reason: ALTCHOICE

## 2019-01-01 RX ORDER — ALPRAZOLAM 0.25 MG/1
0.5 TABLET ORAL
Status: CANCELLED | OUTPATIENT
Start: 2019-01-01 | End: 2019-01-01

## 2019-01-01 RX ORDER — CIPROFLOXACIN 2 MG/ML
400 INJECTION, SOLUTION INTRAVENOUS EVERY 24 HOURS
Status: DISCONTINUED | OUTPATIENT
Start: 2019-01-01 | End: 2019-01-01

## 2019-01-01 RX ORDER — CALCIUM GLUCONATE 94 MG/ML
2 INJECTION, SOLUTION INTRAVENOUS ONCE
Status: COMPLETED | OUTPATIENT
Start: 2019-01-01 | End: 2019-01-01

## 2019-01-01 RX ORDER — PROMETHAZINE HYDROCHLORIDE 25 MG/ML
6.25 INJECTION, SOLUTION INTRAMUSCULAR; INTRAVENOUS
Status: DISCONTINUED | OUTPATIENT
Start: 2019-01-01 | End: 2019-01-01 | Stop reason: HOSPADM

## 2019-01-01 RX ORDER — CYCLOBENZAPRINE HCL 10 MG
10 TABLET ORAL NIGHTLY
Status: ON HOLD | COMMUNITY
End: 2020-01-01 | Stop reason: CLARIF

## 2019-01-01 RX ORDER — PANTOPRAZOLE SODIUM 40 MG/1
40 TABLET, DELAYED RELEASE ORAL
Status: DISCONTINUED | OUTPATIENT
Start: 2019-01-01 | End: 2019-01-01 | Stop reason: HOSPADM

## 2019-01-01 RX ORDER — ACETAMINOPHEN 500 MG
1000 TABLET ORAL EVERY 8 HOURS SCHEDULED
Status: DISCONTINUED | OUTPATIENT
Start: 2019-01-01 | End: 2019-01-01 | Stop reason: HOSPADM

## 2019-01-01 RX ORDER — METRONIDAZOLE 500 MG/1
500 TABLET ORAL EVERY 8 HOURS SCHEDULED
Status: DISCONTINUED | OUTPATIENT
Start: 2019-01-01 | End: 2019-01-01

## 2019-01-01 RX ORDER — NYSTATIN 100000 U/G
CREAM TOPICAL 3 TIMES DAILY
Status: DISCONTINUED | OUTPATIENT
Start: 2019-01-01 | End: 2019-01-01 | Stop reason: HOSPADM

## 2019-01-01 RX ORDER — ACETAMINOPHEN 10 MG/ML
1000 INJECTION, SOLUTION INTRAVENOUS EVERY 6 HOURS PRN
Status: DISPENSED | OUTPATIENT
Start: 2019-01-01 | End: 2019-01-01

## 2019-01-01 RX ORDER — ASPIRIN 81 MG/1
81 TABLET, CHEWABLE ORAL DAILY
Status: DISCONTINUED | OUTPATIENT
Start: 2019-01-01 | End: 2019-01-01 | Stop reason: HOSPADM

## 2019-01-01 RX ORDER — OXYCODONE AND ACETAMINOPHEN 7.5; 325 MG/1; MG/1
1 TABLET ORAL ONCE
Status: COMPLETED | OUTPATIENT
Start: 2019-01-01 | End: 2019-01-01

## 2019-01-01 RX ORDER — MELOXICAM 7.5 MG/1
7.5 TABLET ORAL DAILY
COMMUNITY
End: 2019-01-01 | Stop reason: ALTCHOICE

## 2019-01-01 RX ORDER — NICOTINE POLACRILEX 4 MG
15 LOZENGE BUCCAL PRN
Status: DISCONTINUED | OUTPATIENT
Start: 2019-01-01 | End: 2019-01-01 | Stop reason: HOSPADM

## 2019-01-01 RX ORDER — MORPHINE SULFATE 2 MG/ML
2 INJECTION, SOLUTION INTRAMUSCULAR; INTRAVENOUS EVERY 4 HOURS PRN
Status: DISCONTINUED | OUTPATIENT
Start: 2019-01-01 | End: 2019-01-01 | Stop reason: HOSPADM

## 2019-01-01 RX ORDER — PSEUDOEPHEDRINE HCL 30 MG
100 TABLET ORAL 2 TIMES DAILY
Status: DISCONTINUED | OUTPATIENT
Start: 2019-01-01 | End: 2019-01-01 | Stop reason: SDUPTHER

## 2019-01-01 RX ORDER — ONDANSETRON 2 MG/ML
INJECTION INTRAMUSCULAR; INTRAVENOUS PRN
Status: DISCONTINUED | OUTPATIENT
Start: 2019-01-01 | End: 2019-01-01 | Stop reason: SDUPTHER

## 2019-01-01 RX ORDER — SODIUM CHLORIDE 9 MG/ML
INJECTION, SOLUTION INTRAVENOUS CONTINUOUS PRN
Status: DISCONTINUED | OUTPATIENT
Start: 2019-01-01 | End: 2019-01-01 | Stop reason: SDUPTHER

## 2019-01-01 RX ORDER — CALCIUM GLUCONATE 94 MG/ML
1 INJECTION, SOLUTION INTRAVENOUS ONCE
Status: DISCONTINUED | OUTPATIENT
Start: 2019-01-01 | End: 2019-01-01

## 2019-01-01 RX ORDER — BISACODYL 10 MG
10 SUPPOSITORY, RECTAL RECTAL DAILY PRN
Qty: 30 SUPPOSITORY | Refills: 0 | Status: SHIPPED | OUTPATIENT
Start: 2019-01-01 | End: 2019-01-01

## 2019-01-01 RX ORDER — ONDANSETRON 2 MG/ML
4 INJECTION INTRAMUSCULAR; INTRAVENOUS EVERY 6 HOURS PRN
Status: DISCONTINUED | OUTPATIENT
Start: 2019-01-01 | End: 2019-01-01 | Stop reason: HOSPADM

## 2019-01-01 RX ORDER — DEXTROSE MONOHYDRATE 25 G/50ML
12.5 INJECTION, SOLUTION INTRAVENOUS PRN
Status: DISCONTINUED | OUTPATIENT
Start: 2019-01-01 | End: 2019-01-01 | Stop reason: HOSPADM

## 2019-01-01 RX ORDER — OXYCODONE AND ACETAMINOPHEN 7.5; 325 MG/1; MG/1
1 TABLET ORAL
COMMUNITY
Start: 2019-01-01 | End: 2019-01-01

## 2019-01-01 RX ORDER — OXYCODONE HYDROCHLORIDE 5 MG/1
10 TABLET ORAL PRN
Status: DISCONTINUED | OUTPATIENT
Start: 2019-01-01 | End: 2019-01-01 | Stop reason: HOSPADM

## 2019-01-01 RX ORDER — LIDOCAINE HYDROCHLORIDE 40 MG/ML
2.5 SOLUTION TOPICAL ONCE
Status: COMPLETED | OUTPATIENT
Start: 2019-01-01 | End: 2019-01-01

## 2019-01-01 RX ORDER — INSULIN GLARGINE 100 [IU]/ML
40 INJECTION, SOLUTION SUBCUTANEOUS NIGHTLY
Status: ON HOLD | COMMUNITY
End: 2020-01-01 | Stop reason: HOSPADM

## 2019-01-01 RX ORDER — OXYCODONE AND ACETAMINOPHEN 7.5; 325 MG/1; MG/1
1 TABLET ORAL EVERY 6 HOURS PRN
Qty: 12 TABLET | Refills: 0 | Status: SHIPPED | OUTPATIENT
Start: 2019-01-01 | End: 2019-01-01

## 2019-01-01 RX ORDER — SODIUM CHLORIDE 0.9 % (FLUSH) 0.9 %
10 SYRINGE (ML) INJECTION PRN
Status: DISCONTINUED | OUTPATIENT
Start: 2019-01-01 | End: 2019-01-01 | Stop reason: ALTCHOICE

## 2019-01-01 RX ORDER — PANTOPRAZOLE SODIUM 40 MG/10ML
40 INJECTION, POWDER, LYOPHILIZED, FOR SOLUTION INTRAVENOUS DAILY
Status: DISCONTINUED | OUTPATIENT
Start: 2019-01-01 | End: 2019-01-01

## 2019-01-01 RX ORDER — OXYCODONE HYDROCHLORIDE 10 MG/1
10-15 TABLET ORAL EVERY 4 HOURS PRN
Qty: 27 TABLET | Refills: 0 | Status: SHIPPED | OUTPATIENT
Start: 2019-01-01 | End: 2019-01-01

## 2019-01-01 RX ORDER — MORPHINE SULFATE 2 MG/ML
2 INJECTION, SOLUTION INTRAMUSCULAR; INTRAVENOUS
Status: ACTIVE | OUTPATIENT
Start: 2019-01-01 | End: 2019-01-01

## 2019-01-01 RX ORDER — SENNA AND DOCUSATE SODIUM 50; 8.6 MG/1; MG/1
1 TABLET, FILM COATED ORAL 2 TIMES DAILY
Status: DISCONTINUED | OUTPATIENT
Start: 2019-01-01 | End: 2019-01-01

## 2019-01-01 RX ORDER — NYSTATIN 100000 U/G
OINTMENT TOPICAL 3 TIMES DAILY
Status: DISCONTINUED | OUTPATIENT
Start: 2019-01-01 | End: 2019-01-01 | Stop reason: CLARIF

## 2019-01-01 RX ORDER — POLYETHYLENE GLYCOL 3350 17 G/17G
17 POWDER, FOR SOLUTION ORAL 2 TIMES DAILY
Qty: 527 G | Refills: 1 | Status: SHIPPED | OUTPATIENT
Start: 2019-01-01 | End: 2019-01-01

## 2019-01-01 RX ORDER — CIPROFLOXACIN 500 MG/1
500 TABLET, FILM COATED ORAL
Status: DISCONTINUED | OUTPATIENT
Start: 2019-01-01 | End: 2019-01-01 | Stop reason: HOSPADM

## 2019-01-01 RX ORDER — SODIUM CHLORIDE 0.9 % (FLUSH) 0.9 %
10 SYRINGE (ML) INJECTION EVERY 12 HOURS SCHEDULED
Status: DISCONTINUED | OUTPATIENT
Start: 2019-01-01 | End: 2019-01-01 | Stop reason: SDUPTHER

## 2019-01-01 RX ORDER — PROPOFOL 10 MG/ML
INJECTION, EMULSION INTRAVENOUS PRN
Status: DISCONTINUED | OUTPATIENT
Start: 2019-01-01 | End: 2019-01-01 | Stop reason: SDUPTHER

## 2019-01-01 RX ORDER — HEPARIN SODIUM 5000 [USP'U]/ML
5000 INJECTION, SOLUTION INTRAVENOUS; SUBCUTANEOUS EVERY 8 HOURS SCHEDULED
Status: DISCONTINUED | OUTPATIENT
Start: 2019-01-01 | End: 2019-01-01 | Stop reason: HOSPADM

## 2019-01-01 RX ORDER — BISACODYL 10 MG
10 SUPPOSITORY, RECTAL RECTAL DAILY PRN
Status: DISCONTINUED | OUTPATIENT
Start: 2019-01-01 | End: 2019-01-01 | Stop reason: HOSPADM

## 2019-01-01 RX ORDER — ACETAMINOPHEN 325 MG/1
650 TABLET ORAL ONCE
Status: COMPLETED | OUTPATIENT
Start: 2019-01-01 | End: 2019-01-01

## 2019-01-01 RX ORDER — FAMOTIDINE 20 MG/1
20 TABLET, FILM COATED ORAL DAILY
Qty: 60 TABLET | Refills: 3 | Status: ON HOLD | OUTPATIENT
Start: 2019-01-01 | End: 2020-01-01 | Stop reason: HOSPADM

## 2019-01-01 RX ORDER — OXYCODONE HYDROCHLORIDE 5 MG/1
10 TABLET ORAL EVERY 4 HOURS PRN
Status: DISCONTINUED | OUTPATIENT
Start: 2019-01-01 | End: 2019-01-01 | Stop reason: HOSPADM

## 2019-01-01 RX ORDER — BISACODYL 10 MG
10 SUPPOSITORY, RECTAL RECTAL ONCE
Status: DISCONTINUED | OUTPATIENT
Start: 2019-01-01 | End: 2019-01-01

## 2019-01-01 RX ORDER — NICOTINE POLACRILEX 4 MG
15 LOZENGE BUCCAL PRN
Status: DISCONTINUED | OUTPATIENT
Start: 2019-01-01 | End: 2019-01-01 | Stop reason: SDUPTHER

## 2019-01-01 RX ORDER — SODIUM CHLORIDE, SODIUM LACTATE, POTASSIUM CHLORIDE, CALCIUM CHLORIDE 600; 310; 30; 20 MG/100ML; MG/100ML; MG/100ML; MG/100ML
INJECTION, SOLUTION INTRAVENOUS CONTINUOUS
Status: DISCONTINUED | OUTPATIENT
Start: 2019-01-01 | End: 2019-01-01

## 2019-01-01 RX ORDER — OXYCODONE HYDROCHLORIDE AND ACETAMINOPHEN 5; 325 MG/1; MG/1
2 TABLET ORAL EVERY 4 HOURS PRN
Status: DISCONTINUED | OUTPATIENT
Start: 2019-01-01 | End: 2019-01-01

## 2019-01-01 RX ORDER — SODIUM CHLORIDE 0.9 % (FLUSH) 0.9 %
10 SYRINGE (ML) INJECTION PRN
Status: CANCELLED | OUTPATIENT
Start: 2019-01-01

## 2019-01-01 RX ORDER — FERROUS SULFATE 325(65) MG
325 TABLET ORAL
Qty: 90 TABLET | Refills: 1 | Status: SHIPPED | OUTPATIENT
Start: 2019-01-01 | End: 2020-01-01 | Stop reason: ALTCHOICE

## 2019-01-01 RX ORDER — OXYCODONE HYDROCHLORIDE AND ACETAMINOPHEN 5; 325 MG/1; MG/1
1 TABLET ORAL EVERY 6 HOURS PRN
Status: ON HOLD | COMMUNITY
End: 2019-01-01 | Stop reason: HOSPADM

## 2019-01-01 RX ORDER — SODIUM POLYSTYRENE SULFONATE 15 G/60ML
15 SUSPENSION ORAL; RECTAL ONCE
Status: DISCONTINUED | OUTPATIENT
Start: 2019-01-01 | End: 2019-01-01

## 2019-01-01 RX ORDER — INSULIN LISPRO 100 [IU]/ML
5 INJECTION, SOLUTION INTRAVENOUS; SUBCUTANEOUS
Status: DISCONTINUED | OUTPATIENT
Start: 2019-01-01 | End: 2019-01-01 | Stop reason: HOSPADM

## 2019-01-01 RX ORDER — HYDRALAZINE HYDROCHLORIDE 20 MG/ML
5 INJECTION INTRAMUSCULAR; INTRAVENOUS EVERY 10 MIN PRN
Status: DISCONTINUED | OUTPATIENT
Start: 2019-01-01 | End: 2019-01-01 | Stop reason: HOSPADM

## 2019-01-01 RX ORDER — SODIUM CHLORIDE 0.9 % (FLUSH) 0.9 %
10 SYRINGE (ML) INJECTION PRN
Status: DISCONTINUED | OUTPATIENT
Start: 2019-01-01 | End: 2019-01-01 | Stop reason: SDUPTHER

## 2019-01-01 RX ORDER — MIDAZOLAM HYDROCHLORIDE 1 MG/ML
INJECTION INTRAMUSCULAR; INTRAVENOUS PRN
Status: DISCONTINUED | OUTPATIENT
Start: 2019-01-01 | End: 2019-01-01 | Stop reason: SDUPTHER

## 2019-01-01 RX ORDER — OXYCODONE AND ACETAMINOPHEN 7.5; 325 MG/1; MG/1
1 TABLET ORAL PRN
Refills: 0 | Status: ON HOLD | COMMUNITY
Start: 2019-01-01 | End: 2019-01-01 | Stop reason: HOSPADM

## 2019-01-01 RX ORDER — SENNA PLUS 8.6 MG/1
2 TABLET ORAL NIGHTLY
Status: DISCONTINUED | OUTPATIENT
Start: 2019-01-01 | End: 2019-01-01 | Stop reason: HOSPADM

## 2019-01-01 RX ORDER — LIDOCAINE HYDROCHLORIDE 20 MG/ML
INJECTION, SOLUTION INTRAVENOUS PRN
Status: DISCONTINUED | OUTPATIENT
Start: 2019-01-01 | End: 2019-01-01 | Stop reason: SDUPTHER

## 2019-01-01 RX ORDER — LIDOCAINE HYDROCHLORIDE 10 MG/ML
INJECTION, SOLUTION EPIDURAL; INFILTRATION; INTRACAUDAL; PERINEURAL
Status: DISPENSED
Start: 2019-01-01 | End: 2019-01-01

## 2019-01-01 RX ORDER — FENTANYL CITRATE 50 UG/ML
25 INJECTION, SOLUTION INTRAMUSCULAR; INTRAVENOUS
Status: ACTIVE | OUTPATIENT
Start: 2019-01-01 | End: 2019-01-01

## 2019-01-01 RX ORDER — LIDOCAINE HYDROCHLORIDE AND EPINEPHRINE 10; 10 MG/ML; UG/ML
20 INJECTION, SOLUTION INFILTRATION; PERINEURAL ONCE
Status: COMPLETED | OUTPATIENT
Start: 2019-01-01 | End: 2019-01-01

## 2019-01-01 RX ORDER — DEXTROSE MONOHYDRATE 25 G/50ML
12.5 INJECTION, SOLUTION INTRAVENOUS PRN
Status: DISCONTINUED | OUTPATIENT
Start: 2019-01-01 | End: 2019-01-01 | Stop reason: SDUPTHER

## 2019-01-01 RX ORDER — SEVELAMER CARBONATE 800 MG/1
3200 TABLET, FILM COATED ORAL
Status: DISCONTINUED | OUTPATIENT
Start: 2019-01-01 | End: 2019-01-01 | Stop reason: HOSPADM

## 2019-01-01 RX ORDER — BISACODYL 10 MG
10 SUPPOSITORY, RECTAL RECTAL ONCE
Status: COMPLETED | OUTPATIENT
Start: 2019-01-01 | End: 2019-01-01

## 2019-01-01 RX ORDER — INSULIN LISPRO 100 [IU]/ML
0-6 INJECTION, SOLUTION INTRAVENOUS; SUBCUTANEOUS NIGHTLY
Status: DISCONTINUED | OUTPATIENT
Start: 2019-01-01 | End: 2019-01-01 | Stop reason: HOSPADM

## 2019-01-01 RX ORDER — INSULIN GLARGINE 100 [IU]/ML
30 INJECTION, SOLUTION SUBCUTANEOUS 2 TIMES DAILY
Status: DISCONTINUED | OUTPATIENT
Start: 2019-01-01 | End: 2019-01-01

## 2019-01-01 RX ORDER — PRAVASTATIN SODIUM 20 MG
20 TABLET ORAL NIGHTLY
Status: DISCONTINUED | OUTPATIENT
Start: 2019-01-01 | End: 2019-01-01

## 2019-01-01 RX ORDER — SODIUM CHLORIDE 0.9 % (FLUSH) 0.9 %
10 SYRINGE (ML) INJECTION PRN
Status: DISCONTINUED | OUTPATIENT
Start: 2019-01-01 | End: 2019-01-01 | Stop reason: HOSPADM

## 2019-01-01 RX ORDER — FENTANYL CITRATE 50 UG/ML
INJECTION, SOLUTION INTRAMUSCULAR; INTRAVENOUS
Status: COMPLETED
Start: 2019-01-01 | End: 2019-01-01

## 2019-01-01 RX ORDER — LEVOTHYROXINE SODIUM 0.05 MG/1
50 TABLET ORAL
Status: DISCONTINUED | OUTPATIENT
Start: 2019-01-01 | End: 2019-01-01 | Stop reason: HOSPADM

## 2019-01-01 RX ORDER — FAMOTIDINE 20 MG/1
20 TABLET, FILM COATED ORAL DAILY
Status: DISCONTINUED | OUTPATIENT
Start: 2019-01-01 | End: 2019-01-01 | Stop reason: HOSPADM

## 2019-01-01 RX ORDER — SODIUM CHLORIDE 450 MG/100ML
INJECTION, SOLUTION INTRAVENOUS CONTINUOUS
Status: DISCONTINUED | OUTPATIENT
Start: 2019-01-01 | End: 2019-01-01

## 2019-01-01 RX ORDER — CEPHALEXIN 500 MG/1
500 CAPSULE ORAL DAILY
Status: ON HOLD | COMMUNITY
Start: 2019-01-01 | End: 2019-01-01 | Stop reason: HOSPADM

## 2019-01-01 RX ORDER — DIPHENHYDRAMINE HYDROCHLORIDE 50 MG/ML
12.5 INJECTION INTRAMUSCULAR; INTRAVENOUS
Status: DISCONTINUED | OUTPATIENT
Start: 2019-01-01 | End: 2019-01-01 | Stop reason: HOSPADM

## 2019-01-01 RX ORDER — LEVOTHYROXINE SODIUM 0.05 MG/1
50 TABLET ORAL DAILY
Status: ON HOLD | COMMUNITY
End: 2020-01-01 | Stop reason: HOSPADM

## 2019-01-01 RX ORDER — CHOLECALCIFEROL (VITAMIN D3) 10 MCG
1 TABLET ORAL DAILY
Status: DISCONTINUED | OUTPATIENT
Start: 2019-01-01 | End: 2019-01-01 | Stop reason: HOSPADM

## 2019-01-01 RX ORDER — LABETALOL 20 MG/4 ML (5 MG/ML) INTRAVENOUS SYRINGE
5 EVERY 10 MIN PRN
Status: DISCONTINUED | OUTPATIENT
Start: 2019-01-01 | End: 2019-01-01 | Stop reason: HOSPADM

## 2019-01-01 RX ORDER — CIPROFLOXACIN 500 MG/1
500 TABLET, FILM COATED ORAL
Qty: 30 TABLET | Refills: 0 | Status: ON HOLD | OUTPATIENT
Start: 2019-01-01 | End: 2019-01-01 | Stop reason: HOSPADM

## 2019-01-01 RX ORDER — DOCUSATE SODIUM 100 MG/1
100 CAPSULE, LIQUID FILLED ORAL DAILY
Status: DISCONTINUED | OUTPATIENT
Start: 2019-01-01 | End: 2019-01-01 | Stop reason: SDUPTHER

## 2019-01-01 RX ORDER — INSULIN GLARGINE 100 [IU]/ML
15 INJECTION, SOLUTION SUBCUTANEOUS 2 TIMES DAILY
Status: DISCONTINUED | OUTPATIENT
Start: 2019-01-01 | End: 2019-01-01

## 2019-01-01 RX ORDER — OXYCODONE HYDROCHLORIDE 5 MG/1
5 TABLET ORAL PRN
Status: DISCONTINUED | OUTPATIENT
Start: 2019-01-01 | End: 2019-01-01 | Stop reason: HOSPADM

## 2019-01-01 RX ORDER — INSULIN LISPRO 100 [IU]/ML
0-6 INJECTION, SOLUTION INTRAVENOUS; SUBCUTANEOUS
Status: DISCONTINUED | OUTPATIENT
Start: 2019-01-01 | End: 2019-01-01 | Stop reason: HOSPADM

## 2019-01-01 RX ORDER — RENO CAPS 100; 1.5; 1.7; 20; 10; 1; 150; 5; 6 MG/1; MG/1; MG/1; MG/1; MG/1; MG/1; UG/1; MG/1; UG/1
1 CAPSULE ORAL DAILY
Status: ON HOLD | COMMUNITY
End: 2020-01-01 | Stop reason: HOSPADM

## 2019-01-01 RX ORDER — OXYCODONE HYDROCHLORIDE AND ACETAMINOPHEN 5; 325 MG/1; MG/1
1 TABLET ORAL EVERY 6 HOURS PRN
COMMUNITY
End: 2019-01-01

## 2019-01-01 RX ORDER — ACETAMINOPHEN 325 MG/1
650 TABLET ORAL EVERY 6 HOURS PRN
Status: DISCONTINUED | OUTPATIENT
Start: 2019-01-01 | End: 2019-01-01 | Stop reason: HOSPADM

## 2019-01-01 RX ORDER — HEPARIN SODIUM 5000 [USP'U]/ML
INJECTION, SOLUTION INTRAVENOUS; SUBCUTANEOUS
Status: DISPENSED
Start: 2019-01-01 | End: 2019-01-01

## 2019-01-01 RX ORDER — ALPRAZOLAM 0.25 MG/1
0.5 TABLET ORAL
Status: DISCONTINUED | OUTPATIENT
Start: 2019-01-01 | End: 2019-01-01 | Stop reason: ALTCHOICE

## 2019-01-01 RX ORDER — MORPHINE SULFATE 4 MG/ML
1 INJECTION, SOLUTION INTRAMUSCULAR; INTRAVENOUS EVERY 5 MIN PRN
Status: DISCONTINUED | OUTPATIENT
Start: 2019-01-01 | End: 2019-01-01 | Stop reason: HOSPADM

## 2019-01-01 RX ORDER — OXYCODONE HYDROCHLORIDE 15 MG/1
15 TABLET ORAL EVERY 4 HOURS PRN
Status: DISCONTINUED | OUTPATIENT
Start: 2019-01-01 | End: 2019-01-01 | Stop reason: HOSPADM

## 2019-01-01 RX ORDER — PSEUDOEPHEDRINE HCL 30 MG
100 TABLET ORAL 2 TIMES DAILY
Qty: 60 CAPSULE | Refills: 0 | Status: ON HOLD | OUTPATIENT
Start: 2019-01-01 | End: 2019-01-01 | Stop reason: HOSPADM

## 2019-01-01 RX ORDER — SENNA PLUS 8.6 MG/1
1 TABLET ORAL DAILY PRN
Status: DISCONTINUED | OUTPATIENT
Start: 2019-01-01 | End: 2019-01-01 | Stop reason: SDUPTHER

## 2019-01-01 RX ORDER — LABETALOL HYDROCHLORIDE 5 MG/ML
5 INJECTION, SOLUTION INTRAVENOUS EVERY 6 HOURS PRN
Status: DISCONTINUED | OUTPATIENT
Start: 2019-01-01 | End: 2019-01-01 | Stop reason: HOSPADM

## 2019-01-01 RX ORDER — BUMETANIDE 1 MG/1
2 TABLET ORAL 2 TIMES DAILY
COMMUNITY
End: 2020-01-01

## 2019-01-01 RX ORDER — PROCHLORPERAZINE EDISYLATE 5 MG/ML
5 INJECTION INTRAMUSCULAR; INTRAVENOUS ONCE
Status: COMPLETED | OUTPATIENT
Start: 2019-01-01 | End: 2019-01-01

## 2019-01-01 RX ORDER — MORPHINE SULFATE 2 MG/ML
2 INJECTION, SOLUTION INTRAMUSCULAR; INTRAVENOUS EVERY 4 HOURS PRN
Status: DISCONTINUED | OUTPATIENT
Start: 2019-01-01 | End: 2019-01-01

## 2019-01-01 RX ORDER — METOCLOPRAMIDE HYDROCHLORIDE 5 MG/ML
10 INJECTION INTRAMUSCULAR; INTRAVENOUS
Status: DISCONTINUED | OUTPATIENT
Start: 2019-01-01 | End: 2019-01-01 | Stop reason: HOSPADM

## 2019-01-01 RX ORDER — ACETAMINOPHEN 650 MG/1
650 SUPPOSITORY RECTAL EVERY 6 HOURS PRN
Status: DISCONTINUED | OUTPATIENT
Start: 2019-01-01 | End: 2019-01-01

## 2019-01-01 RX ORDER — SENNA PLUS 8.6 MG/1
2 TABLET ORAL NIGHTLY PRN
Qty: 60 TABLET | Refills: 0 | Status: SHIPPED | OUTPATIENT
Start: 2019-01-01 | End: 2019-01-01

## 2019-01-01 RX ORDER — ACETAMINOPHEN, ASPIRIN AND CAFFEINE 250; 250; 65 MG/1; MG/1; MG/1
1 TABLET, FILM COATED ORAL EVERY 6 HOURS PRN
Status: DISCONTINUED | OUTPATIENT
Start: 2019-01-01 | End: 2019-01-01 | Stop reason: HOSPADM

## 2019-01-01 RX ORDER — CYCLOBENZAPRINE HCL 10 MG
10 TABLET ORAL NIGHTLY
Status: DISCONTINUED | OUTPATIENT
Start: 2019-01-01 | End: 2019-01-01 | Stop reason: HOSPADM

## 2019-01-01 RX ORDER — DEXTROSE MONOHYDRATE 50 MG/ML
100 INJECTION, SOLUTION INTRAVENOUS PRN
Status: DISCONTINUED | OUTPATIENT
Start: 2019-01-01 | End: 2019-01-01 | Stop reason: SDUPTHER

## 2019-01-01 RX ORDER — AMMONIUM LACTATE 12 G/100G
LOTION TOPICAL PRN
Status: DISCONTINUED | OUTPATIENT
Start: 2019-01-01 | End: 2019-01-01 | Stop reason: HOSPADM

## 2019-01-01 RX ORDER — ACETAMINOPHEN 325 MG/1
650 TABLET ORAL EVERY 4 HOURS PRN
Status: DISCONTINUED | OUTPATIENT
Start: 2019-01-01 | End: 2019-01-01 | Stop reason: HOSPADM

## 2019-01-01 RX ORDER — MEPERIDINE HYDROCHLORIDE 25 MG/ML
12.5 INJECTION INTRAMUSCULAR; INTRAVENOUS; SUBCUTANEOUS EVERY 5 MIN PRN
Status: DISCONTINUED | OUTPATIENT
Start: 2019-01-01 | End: 2019-01-01 | Stop reason: HOSPADM

## 2019-01-01 RX ORDER — LIDOCAINE HYDROCHLORIDE 40 MG/ML
SOLUTION TOPICAL ONCE
Status: COMPLETED | OUTPATIENT
Start: 2019-01-01 | End: 2019-01-01

## 2019-01-01 RX ORDER — SODIUM CHLORIDE 0.9 % (FLUSH) 0.9 %
10 SYRINGE (ML) INJECTION EVERY 12 HOURS SCHEDULED
Status: DISCONTINUED | OUTPATIENT
Start: 2019-01-01 | End: 2019-01-01 | Stop reason: HOSPADM

## 2019-01-01 RX ORDER — INSULIN LISPRO 100 [IU]/ML
0-12 INJECTION, SOLUTION INTRAVENOUS; SUBCUTANEOUS
Status: DISCONTINUED | OUTPATIENT
Start: 2019-01-01 | End: 2019-01-01 | Stop reason: HOSPADM

## 2019-01-01 RX ORDER — FLUCONAZOLE 100 MG/1
100 TABLET ORAL
Qty: 6 TABLET | Refills: 0 | Status: SHIPPED | OUTPATIENT
Start: 2019-01-01 | End: 2019-01-01

## 2019-01-01 RX ORDER — DOCUSATE SODIUM 100 MG/1
100 CAPSULE, LIQUID FILLED ORAL 2 TIMES DAILY
Status: DISCONTINUED | OUTPATIENT
Start: 2019-01-01 | End: 2019-01-01

## 2019-01-01 RX ORDER — FENTANYL CITRATE 50 UG/ML
50 INJECTION, SOLUTION INTRAMUSCULAR; INTRAVENOUS EVERY 5 MIN PRN
Status: COMPLETED | OUTPATIENT
Start: 2019-01-01 | End: 2019-01-01

## 2019-01-01 RX ORDER — NYSTATIN 100000 U/G
OINTMENT TOPICAL 3 TIMES DAILY
Status: ON HOLD | COMMUNITY
End: 2020-01-01 | Stop reason: CLARIF

## 2019-01-01 RX ORDER — ONDANSETRON 2 MG/ML
4 INJECTION INTRAMUSCULAR; INTRAVENOUS EVERY 6 HOURS PRN
Status: DISCONTINUED | OUTPATIENT
Start: 2019-01-01 | End: 2019-01-01 | Stop reason: SDUPTHER

## 2019-01-01 RX ORDER — DEXTROSE MONOHYDRATE 50 MG/ML
INJECTION, SOLUTION INTRAVENOUS CONTINUOUS PRN
Status: DISCONTINUED | OUTPATIENT
Start: 2019-01-01 | End: 2019-01-01 | Stop reason: SDUPTHER

## 2019-01-01 RX ORDER — 0.9 % SODIUM CHLORIDE 0.9 %
500 INTRAVENOUS SOLUTION INTRAVENOUS PRN
Status: DISCONTINUED | OUTPATIENT
Start: 2019-01-01 | End: 2019-01-01 | Stop reason: HOSPADM

## 2019-01-01 RX ORDER — CIPROFLOXACIN 500 MG/1
500 TABLET, FILM COATED ORAL
Status: DISCONTINUED | OUTPATIENT
Start: 2019-01-01 | End: 2019-01-01

## 2019-01-01 RX ORDER — SODIUM CHLORIDE 0.9 % (FLUSH) 0.9 %
10 SYRINGE (ML) INJECTION EVERY 12 HOURS SCHEDULED
Status: CANCELLED | OUTPATIENT
Start: 2019-01-01

## 2019-01-01 RX ORDER — OXYCODONE AND ACETAMINOPHEN 7.5; 325 MG/1; MG/1
1 TABLET ORAL EVERY 6 HOURS PRN
Status: DISCONTINUED | OUTPATIENT
Start: 2019-01-01 | End: 2019-01-01 | Stop reason: HOSPADM

## 2019-01-01 RX ORDER — SPIRONOLACTONE 25 MG/1
25 TABLET ORAL 2 TIMES DAILY
Status: ON HOLD | COMMUNITY
End: 2019-01-01 | Stop reason: HOSPADM

## 2019-01-01 RX ORDER — LEVOTHYROXINE SODIUM ANHYDROUS 100 UG/5ML
25 INJECTION, POWDER, LYOPHILIZED, FOR SOLUTION INTRAVENOUS DAILY
Status: DISCONTINUED | OUTPATIENT
Start: 2019-01-01 | End: 2019-01-01

## 2019-01-01 RX ORDER — MORPHINE SULFATE 2 MG/ML
2 INJECTION, SOLUTION INTRAMUSCULAR; INTRAVENOUS ONCE
Status: COMPLETED | OUTPATIENT
Start: 2019-01-01 | End: 2019-01-01

## 2019-01-01 RX ORDER — LIDOCAINE HYDROCHLORIDE 10 MG/ML
5 INJECTION, SOLUTION EPIDURAL; INFILTRATION; INTRACAUDAL; PERINEURAL ONCE
Status: COMPLETED | OUTPATIENT
Start: 2019-01-01 | End: 2019-01-01

## 2019-01-01 RX ORDER — SENNA AND DOCUSATE SODIUM 50; 8.6 MG/1; MG/1
2 TABLET, FILM COATED ORAL 2 TIMES DAILY
Status: DISCONTINUED | OUTPATIENT
Start: 2019-01-01 | End: 2019-01-01 | Stop reason: HOSPADM

## 2019-01-01 RX ORDER — ONDANSETRON 2 MG/ML
4 INJECTION INTRAMUSCULAR; INTRAVENOUS ONCE
Status: COMPLETED | OUTPATIENT
Start: 2019-01-01 | End: 2019-01-01

## 2019-01-01 RX ORDER — DEXTROSE MONOHYDRATE 50 MG/ML
100 INJECTION, SOLUTION INTRAVENOUS PRN
Status: DISCONTINUED | OUTPATIENT
Start: 2019-01-01 | End: 2019-01-01 | Stop reason: HOSPADM

## 2019-01-01 RX ORDER — MORPHINE SULFATE 2 MG/ML
2 INJECTION, SOLUTION INTRAMUSCULAR; INTRAVENOUS
Status: DISPENSED | OUTPATIENT
Start: 2019-01-01 | End: 2019-01-01

## 2019-01-01 RX ORDER — DOCUSATE SODIUM 100 MG/1
100 CAPSULE, LIQUID FILLED ORAL 2 TIMES DAILY
Status: DISCONTINUED | OUTPATIENT
Start: 2019-01-01 | End: 2019-01-01 | Stop reason: HOSPADM

## 2019-01-01 RX ORDER — SODIUM POLYSTYRENE SULFONATE 15 G/60ML
30 SUSPENSION ORAL; RECTAL ONCE
Status: COMPLETED | OUTPATIENT
Start: 2019-01-01 | End: 2019-01-01

## 2019-01-01 RX ORDER — FLUCONAZOLE, SODIUM CHLORIDE 2 MG/ML
100 INJECTION INTRAVENOUS
Status: DISCONTINUED | OUTPATIENT
Start: 2019-01-01 | End: 2019-01-01

## 2019-01-01 RX ORDER — SENNA AND DOCUSATE SODIUM 50; 8.6 MG/1; MG/1
2 TABLET, FILM COATED ORAL 2 TIMES DAILY
Qty: 60 TABLET | Refills: 3 | Status: ON HOLD | OUTPATIENT
Start: 2019-01-01 | End: 2020-01-01 | Stop reason: CLARIF

## 2019-01-01 RX ORDER — SEVELAMER CARBONATE 800 MG/1
2400 TABLET, FILM COATED ORAL PRN
Status: DISCONTINUED | OUTPATIENT
Start: 2019-01-01 | End: 2019-01-01 | Stop reason: HOSPADM

## 2019-01-01 RX ORDER — SODIUM CHLORIDE 9 MG/ML
INJECTION, SOLUTION INTRAVENOUS
Status: COMPLETED
Start: 2019-01-01 | End: 2019-01-01

## 2019-01-01 RX ORDER — HYDRALAZINE HYDROCHLORIDE 20 MG/ML
5 INJECTION INTRAMUSCULAR; INTRAVENOUS EVERY 6 HOURS PRN
Status: DISCONTINUED | OUTPATIENT
Start: 2019-01-01 | End: 2019-01-01 | Stop reason: HOSPADM

## 2019-01-01 RX ORDER — LEVOTHYROXINE SODIUM 0.05 MG/1
50 TABLET ORAL DAILY
Status: DISCONTINUED | OUTPATIENT
Start: 2019-01-01 | End: 2019-01-01 | Stop reason: HOSPADM

## 2019-01-01 RX ORDER — IBUPROFEN 200 MG
200 TABLET ORAL EVERY 6 HOURS PRN
Status: ON HOLD | COMMUNITY
End: 2019-01-01 | Stop reason: HOSPADM

## 2019-01-01 RX ORDER — ACETAMINOPHEN, ASPIRIN AND CAFFEINE 250; 250; 65 MG/1; MG/1; MG/1
1 TABLET, FILM COATED ORAL EVERY 6 HOURS PRN
Status: ON HOLD | COMMUNITY
End: 2020-01-01 | Stop reason: CLARIF

## 2019-01-01 RX ORDER — ACETAMINOPHEN 325 MG/1
650 TABLET ORAL EVERY 6 HOURS PRN
Status: DISCONTINUED | OUTPATIENT
Start: 2019-01-01 | End: 2019-01-01

## 2019-01-01 RX ORDER — SODIUM CHLORIDE 9 MG/ML
5 INJECTION INTRAVENOUS DAILY
Status: DISCONTINUED | OUTPATIENT
Start: 2019-01-01 | End: 2019-01-01

## 2019-01-01 RX ORDER — POLYETHYLENE GLYCOL 3350 17 G/17G
17 POWDER, FOR SOLUTION ORAL 2 TIMES DAILY
Status: DISCONTINUED | OUTPATIENT
Start: 2019-01-01 | End: 2019-01-01 | Stop reason: HOSPADM

## 2019-01-01 RX ORDER — CIPROFLOXACIN 500 MG/1
500 TABLET, FILM COATED ORAL
Qty: 33 TABLET | Refills: 0 | Status: SHIPPED | OUTPATIENT
Start: 2019-01-01 | End: 2019-01-01 | Stop reason: HOSPADM

## 2019-01-01 RX ORDER — SODIUM CHLORIDE 9 MG/ML
INJECTION, SOLUTION INTRAVENOUS CONTINUOUS
Status: DISCONTINUED | OUTPATIENT
Start: 2019-01-01 | End: 2019-01-01 | Stop reason: HOSPADM

## 2019-01-01 RX ORDER — ACETAMINOPHEN 325 MG/1
650 TABLET ORAL EVERY 6 HOURS PRN
Status: ON HOLD | COMMUNITY
End: 2020-01-01 | Stop reason: HOSPADM

## 2019-01-01 RX ORDER — SODIUM CHLORIDE 9 MG/ML
INJECTION, SOLUTION INTRAVENOUS CONTINUOUS
Status: DISCONTINUED | OUTPATIENT
Start: 2019-01-01 | End: 2019-01-01

## 2019-01-01 RX ORDER — SEVELAMER CARBONATE 800 MG/1
3 TABLET, FILM COATED ORAL
COMMUNITY
End: 2019-01-01

## 2019-01-01 RX ORDER — METRONIDAZOLE 500 MG/1
500 TABLET ORAL EVERY 8 HOURS SCHEDULED
Qty: 111 TABLET | Refills: 0 | Status: SHIPPED | OUTPATIENT
Start: 2019-01-01 | End: 2019-01-01 | Stop reason: HOSPADM

## 2019-01-01 RX ORDER — SODIUM CHLORIDE 9 MG/ML
INJECTION, SOLUTION INTRAVENOUS CONTINUOUS
Status: CANCELLED | OUTPATIENT
Start: 2019-01-01

## 2019-01-01 RX ORDER — FENTANYL CITRATE 50 UG/ML
INJECTION, SOLUTION INTRAMUSCULAR; INTRAVENOUS PRN
Status: DISCONTINUED | OUTPATIENT
Start: 2019-01-01 | End: 2019-01-01 | Stop reason: SDUPTHER

## 2019-01-01 RX ORDER — PRAVASTATIN SODIUM 20 MG
20 TABLET ORAL NIGHTLY
Status: DISCONTINUED | OUTPATIENT
Start: 2019-01-01 | End: 2019-01-01 | Stop reason: HOSPADM

## 2019-01-01 RX ORDER — CIPROFLOXACIN 2 MG/ML
200 INJECTION, SOLUTION INTRAVENOUS EVERY 12 HOURS
Status: DISCONTINUED | OUTPATIENT
Start: 2019-01-01 | End: 2019-01-01

## 2019-01-01 RX ORDER — FLUCONAZOLE 200 MG/1
100 TABLET ORAL
Status: DISCONTINUED | OUTPATIENT
Start: 2019-01-01 | End: 2019-01-01 | Stop reason: HOSPADM

## 2019-01-01 RX ORDER — CIPROFLOXACIN 2 MG/ML
200 INJECTION, SOLUTION INTRAVENOUS ONCE
Status: COMPLETED | OUTPATIENT
Start: 2019-01-01 | End: 2019-01-01

## 2019-01-01 RX ORDER — OXYCODONE HYDROCHLORIDE AND ACETAMINOPHEN 5; 325 MG/1; MG/1
1 TABLET ORAL EVERY 4 HOURS PRN
Status: DISCONTINUED | OUTPATIENT
Start: 2019-01-01 | End: 2019-01-01

## 2019-01-01 RX ORDER — SODIUM CHLORIDE 0.9 % (FLUSH) 0.9 %
10 SYRINGE (ML) INJECTION EVERY 12 HOURS SCHEDULED
Status: DISCONTINUED | OUTPATIENT
Start: 2019-01-01 | End: 2019-01-01 | Stop reason: ALTCHOICE

## 2019-01-01 RX ORDER — HEPARIN SODIUM 1000 [USP'U]/ML
2500 INJECTION, SOLUTION INTRAVENOUS; SUBCUTANEOUS
Status: DISCONTINUED | OUTPATIENT
Start: 2019-01-01 | End: 2019-01-01 | Stop reason: HOSPADM

## 2019-01-01 RX ORDER — OXYCODONE AND ACETAMINOPHEN 7.5; 325 MG/1; MG/1
1 TABLET ORAL EVERY 6 HOURS PRN
Status: ON HOLD | COMMUNITY
End: 2019-01-01 | Stop reason: DRUGHIGH

## 2019-01-01 RX ORDER — ASPIRIN 81 MG/1
81 TABLET ORAL DAILY
Status: DISCONTINUED | OUTPATIENT
Start: 2019-01-01 | End: 2019-01-01 | Stop reason: HOSPADM

## 2019-01-01 RX ORDER — BUMETANIDE 2 MG/1
2 TABLET ORAL 2 TIMES DAILY
Status: DISCONTINUED | OUTPATIENT
Start: 2019-01-01 | End: 2019-01-01 | Stop reason: HOSPADM

## 2019-01-01 RX ADMIN — VANCOMYCIN HYDROCHLORIDE 1250 MG: 10 INJECTION, POWDER, LYOPHILIZED, FOR SOLUTION INTRAVENOUS at 13:09

## 2019-01-01 RX ADMIN — ACETAMINOPHEN 1000 MG: 500 TABLET ORAL at 15:29

## 2019-01-01 RX ADMIN — HYDROMORPHONE HYDROCHLORIDE 0.5 MG: 1 INJECTION, SOLUTION INTRAMUSCULAR; INTRAVENOUS; SUBCUTANEOUS at 11:45

## 2019-01-01 RX ADMIN — OXYCODONE HYDROCHLORIDE AND ACETAMINOPHEN 2 TABLET: 5; 325 TABLET ORAL at 21:51

## 2019-01-01 RX ADMIN — OXYCODONE HYDROCHLORIDE AND ACETAMINOPHEN 1 TABLET: 7.5; 325 TABLET ORAL at 22:22

## 2019-01-01 RX ADMIN — CEFEPIME HYDROCHLORIDE 1 G: 1 INJECTION, POWDER, FOR SOLUTION INTRAMUSCULAR; INTRAVENOUS at 17:00

## 2019-01-01 RX ADMIN — CIPROFLOXACIN 200 MG: 2 INJECTION, SOLUTION INTRAVENOUS at 08:55

## 2019-01-01 RX ADMIN — HEPARIN SODIUM 5000 UNITS: 5000 INJECTION INTRAVENOUS; SUBCUTANEOUS at 06:06

## 2019-01-01 RX ADMIN — HEPARIN SODIUM 2500 UNITS: 1000 INJECTION INTRAVENOUS; SUBCUTANEOUS at 07:48

## 2019-01-01 RX ADMIN — HEPARIN SODIUM 5000 UNITS: 5000 INJECTION INTRAVENOUS; SUBCUTANEOUS at 21:38

## 2019-01-01 RX ADMIN — ACETAMINOPHEN 650 MG: 325 TABLET ORAL at 19:25

## 2019-01-01 RX ADMIN — OXYCODONE HYDROCHLORIDE AND ACETAMINOPHEN 1 TABLET: 7.5; 325 TABLET ORAL at 06:12

## 2019-01-01 RX ADMIN — HYDROMORPHONE HYDROCHLORIDE 0.25 MG: 1 INJECTION, SOLUTION INTRAMUSCULAR; INTRAVENOUS; SUBCUTANEOUS at 17:13

## 2019-01-01 RX ADMIN — HYDROMORPHONE HYDROCHLORIDE 0.5 MG: 1 INJECTION, SOLUTION INTRAMUSCULAR; INTRAVENOUS; SUBCUTANEOUS at 12:33

## 2019-01-01 RX ADMIN — INSULIN LISPRO 1 UNITS: 100 INJECTION, SOLUTION INTRAVENOUS; SUBCUTANEOUS at 13:14

## 2019-01-01 RX ADMIN — PRAVASTATIN SODIUM 20 MG: 20 TABLET ORAL at 20:09

## 2019-01-01 RX ADMIN — ACETAMINOPHEN 1000 MG: 500 TABLET ORAL at 06:26

## 2019-01-01 RX ADMIN — ASPIRIN 81 MG CHEWABLE TABLET 81 MG: 81 TABLET CHEWABLE at 13:41

## 2019-01-01 RX ADMIN — AMLODIPINE BESYLATE 10 MG: 10 TABLET ORAL at 09:21

## 2019-01-01 RX ADMIN — CYCLOBENZAPRINE HYDROCHLORIDE 10 MG: 10 TABLET, FILM COATED ORAL at 21:07

## 2019-01-01 RX ADMIN — OXYCODONE HYDROCHLORIDE AND ACETAMINOPHEN 1 TABLET: 7.5; 325 TABLET ORAL at 16:48

## 2019-01-01 RX ADMIN — HEPARIN SODIUM 5000 UNITS: 5000 INJECTION INTRAVENOUS; SUBCUTANEOUS at 20:46

## 2019-01-01 RX ADMIN — OXYCODONE HYDROCHLORIDE 15 MG: 15 TABLET ORAL at 00:17

## 2019-01-01 RX ADMIN — ASPIRIN 81 MG: 81 TABLET, COATED ORAL at 09:05

## 2019-01-01 RX ADMIN — Medication 15 ML: at 13:04

## 2019-01-01 RX ADMIN — HYDROMORPHONE HYDROCHLORIDE 0.5 MG: 1 INJECTION, SOLUTION INTRAMUSCULAR; INTRAVENOUS; SUBCUTANEOUS at 22:26

## 2019-01-01 RX ADMIN — LEVOTHYROXINE SODIUM 50 MCG: 50 TABLET ORAL at 06:26

## 2019-01-01 RX ADMIN — DOCUSATE SODIUM 100 MG: 100 CAPSULE, LIQUID FILLED ORAL at 20:40

## 2019-01-01 RX ADMIN — HEPARIN SODIUM 5000 UNITS: 5000 INJECTION INTRAVENOUS; SUBCUTANEOUS at 23:28

## 2019-01-01 RX ADMIN — INSULIN LISPRO 2 UNITS: 100 INJECTION, SOLUTION INTRAVENOUS; SUBCUTANEOUS at 09:07

## 2019-01-01 RX ADMIN — CYCLOBENZAPRINE HYDROCHLORIDE 10 MG: 10 TABLET, FILM COATED ORAL at 20:10

## 2019-01-01 RX ADMIN — PROPOFOL 100 MG: 10 INJECTION, EMULSION INTRAVENOUS at 14:49

## 2019-01-01 RX ADMIN — NEPHROCAP 1 MG: 1 CAP ORAL at 08:45

## 2019-01-01 RX ADMIN — POLYETHYLENE GLYCOL 3350 17 G: 17 POWDER, FOR SOLUTION ORAL at 09:18

## 2019-01-01 RX ADMIN — HYOSCYAMINE SULFATE: 16 SOLUTION at 13:44

## 2019-01-01 RX ADMIN — MUPIROCIN: 20 OINTMENT TOPICAL at 20:19

## 2019-01-01 RX ADMIN — SEVELAMER CARBONATE 3200 MG: 800 TABLET, FILM COATED ORAL at 08:01

## 2019-01-01 RX ADMIN — SENNOSIDES 17.2 MG: 8.6 TABLET, FILM COATED ORAL at 20:09

## 2019-01-01 RX ADMIN — HYOSCYAMINE SULFATE: 16 SOLUTION at 09:00

## 2019-01-01 RX ADMIN — DOCUSATE SODIUM 100 MG: 100 CAPSULE, LIQUID FILLED ORAL at 08:36

## 2019-01-01 RX ADMIN — LEVOTHYROXINE SODIUM 50 MCG: 50 TABLET ORAL at 07:04

## 2019-01-01 RX ADMIN — Medication 10 ML: at 15:10

## 2019-01-01 RX ADMIN — SODIUM CHLORIDE 250 ML: 9 INJECTION, SOLUTION INTRAVENOUS at 12:19

## 2019-01-01 RX ADMIN — OXYCODONE HYDROCHLORIDE AND ACETAMINOPHEN 1 TABLET: 7.5; 325 TABLET ORAL at 13:38

## 2019-01-01 RX ADMIN — OXYCODONE HYDROCHLORIDE 15 MG: 15 TABLET ORAL at 22:28

## 2019-01-01 RX ADMIN — HEPARIN SODIUM 5000 UNITS: 5000 INJECTION INTRAVENOUS; SUBCUTANEOUS at 05:37

## 2019-01-01 RX ADMIN — FENTANYL CITRATE 50 MCG: 50 INJECTION INTRAMUSCULAR; INTRAVENOUS at 14:29

## 2019-01-01 RX ADMIN — SODIUM CHLORIDE: 450 INJECTION, SOLUTION INTRAVENOUS at 21:10

## 2019-01-01 RX ADMIN — OXYCODONE HYDROCHLORIDE 15 MG: 15 TABLET ORAL at 06:26

## 2019-01-01 RX ADMIN — ONDANSETRON 4 MG: 2 INJECTION INTRAMUSCULAR; INTRAVENOUS at 14:33

## 2019-01-01 RX ADMIN — NYSTATIN: 100000 CREAM TOPICAL at 14:38

## 2019-01-01 RX ADMIN — ASPIRIN 81 MG CHEWABLE TABLET 81 MG: 81 TABLET CHEWABLE at 08:37

## 2019-01-01 RX ADMIN — PRAVASTATIN SODIUM 20 MG: 20 TABLET ORAL at 23:49

## 2019-01-01 RX ADMIN — CYCLOBENZAPRINE HYDROCHLORIDE 10 MG: 10 TABLET, FILM COATED ORAL at 23:49

## 2019-01-01 RX ADMIN — LEVOTHYROXINE SODIUM 50 MCG: 50 TABLET ORAL at 06:41

## 2019-01-01 RX ADMIN — PRAVASTATIN SODIUM 20 MG: 20 TABLET ORAL at 20:40

## 2019-01-01 RX ADMIN — BUMETANIDE 2 MG: 2 TABLET ORAL at 09:05

## 2019-01-01 RX ADMIN — OXYCODONE HYDROCHLORIDE 15 MG: 15 TABLET ORAL at 13:10

## 2019-01-01 RX ADMIN — DOCUSATE SODIUM 100 MG: 100 CAPSULE, LIQUID FILLED ORAL at 20:46

## 2019-01-01 RX ADMIN — LEVOTHYROXINE SODIUM 50 MCG: 50 TABLET ORAL at 04:54

## 2019-01-01 RX ADMIN — SEVELAMER CARBONATE 3200 MG: 800 TABLET, FILM COATED ORAL at 17:30

## 2019-01-01 RX ADMIN — INSULIN LISPRO 3 UNITS: 100 INJECTION, SOLUTION INTRAVENOUS; SUBCUTANEOUS at 21:12

## 2019-01-01 RX ADMIN — SEVELAMER CARBONATE 3200 MG: 800 TABLET, FILM COATED ORAL at 15:38

## 2019-01-01 RX ADMIN — INSULIN GLARGINE 20 UNITS: 100 INJECTION, SOLUTION SUBCUTANEOUS at 22:39

## 2019-01-01 RX ADMIN — LIDOCAINE HYDROCHLORIDE 2.5 ML: 40 SOLUTION TOPICAL at 08:59

## 2019-01-01 RX ADMIN — FAMOTIDINE 20 MG: 20 TABLET ORAL at 08:36

## 2019-01-01 RX ADMIN — OXYCODONE HYDROCHLORIDE AND ACETAMINOPHEN 1 TABLET: 7.5; 325 TABLET ORAL at 06:01

## 2019-01-01 RX ADMIN — OXYCODONE HYDROCHLORIDE AND ACETAMINOPHEN 1 TABLET: 7.5; 325 TABLET ORAL at 21:00

## 2019-01-01 RX ADMIN — HYDROMORPHONE HYDROCHLORIDE 0.5 MG: 1 INJECTION, SOLUTION INTRAMUSCULAR; INTRAVENOUS; SUBCUTANEOUS at 17:02

## 2019-01-01 RX ADMIN — HYDROMORPHONE HYDROCHLORIDE 0.5 MG: 1 INJECTION, SOLUTION INTRAMUSCULAR; INTRAVENOUS; SUBCUTANEOUS at 18:20

## 2019-01-01 RX ADMIN — DEXTROSE MONOHYDRATE 750 MG: 5 INJECTION, SOLUTION INTRAVENOUS at 10:34

## 2019-01-01 RX ADMIN — LEVOTHYROXINE SODIUM 50 MCG: 50 TABLET ORAL at 06:39

## 2019-01-01 RX ADMIN — ACETAMINOPHEN 1000 MG: 500 TABLET ORAL at 15:26

## 2019-01-01 RX ADMIN — PROCHLORPERAZINE EDISYLATE 5 MG: 5 INJECTION INTRAMUSCULAR; INTRAVENOUS at 04:02

## 2019-01-01 RX ADMIN — SEVELAMER CARBONATE 3200 MG: 800 TABLET, FILM COATED ORAL at 13:11

## 2019-01-01 RX ADMIN — TRIAMCINOLONE ACETONIDE: 1 OINTMENT TOPICAL at 13:13

## 2019-01-01 RX ADMIN — INSULIN LISPRO 1 UNITS: 100 INJECTION, SOLUTION INTRAVENOUS; SUBCUTANEOUS at 07:58

## 2019-01-01 RX ADMIN — CYCLOBENZAPRINE HYDROCHLORIDE 10 MG: 10 TABLET, FILM COATED ORAL at 20:59

## 2019-01-01 RX ADMIN — OXYCODONE HYDROCHLORIDE AND ACETAMINOPHEN 2 TABLET: 5; 325 TABLET ORAL at 05:57

## 2019-01-01 RX ADMIN — MUPIROCIN: 20 OINTMENT TOPICAL at 08:37

## 2019-01-01 RX ADMIN — FENTANYL CITRATE 25 MCG: 50 INJECTION INTRAMUSCULAR; INTRAVENOUS at 15:50

## 2019-01-01 RX ADMIN — LIDOCAINE HYDROCHLORIDE 2.5 ML: 40 SOLUTION TOPICAL at 10:31

## 2019-01-01 RX ADMIN — HEPARIN SODIUM 5000 UNITS: 5000 INJECTION INTRAVENOUS; SUBCUTANEOUS at 22:55

## 2019-01-01 RX ADMIN — INSULIN GLARGINE 30 UNITS: 100 INJECTION, SOLUTION SUBCUTANEOUS at 21:52

## 2019-01-01 RX ADMIN — AMLODIPINE BESYLATE 10 MG: 10 TABLET ORAL at 13:02

## 2019-01-01 RX ADMIN — SENNOSIDES AND DOCUSATE SODIUM 1 TABLET: 8.6; 5 TABLET ORAL at 20:59

## 2019-01-01 RX ADMIN — MORPHINE SULFATE 2 MG: 2 INJECTION, SOLUTION INTRAMUSCULAR; INTRAVENOUS at 03:27

## 2019-01-01 RX ADMIN — ASPIRIN 81 MG CHEWABLE TABLET 81 MG: 81 TABLET CHEWABLE at 08:09

## 2019-01-01 RX ADMIN — INSULIN LISPRO 2 UNITS: 100 INJECTION, SOLUTION INTRAVENOUS; SUBCUTANEOUS at 12:37

## 2019-01-01 RX ADMIN — METRONIDAZOLE 500 MG: 500 TABLET ORAL at 22:37

## 2019-01-01 RX ADMIN — LEVOTHYROXINE SODIUM 50 MCG: 50 TABLET ORAL at 06:06

## 2019-01-01 RX ADMIN — PRAVASTATIN SODIUM 20 MG: 20 TABLET ORAL at 21:11

## 2019-01-01 RX ADMIN — PIPERACILLIN SODIUM,TAZOBACTAM SODIUM 3.38 G: 3; .375 INJECTION, POWDER, FOR SOLUTION INTRAVENOUS at 18:04

## 2019-01-01 RX ADMIN — FAMOTIDINE 20 MG: 20 TABLET ORAL at 13:40

## 2019-01-01 RX ADMIN — SEVELAMER CARBONATE 3200 MG: 800 TABLET, FILM COATED ORAL at 07:54

## 2019-01-01 RX ADMIN — MUPIROCIN: 20 OINTMENT TOPICAL at 08:12

## 2019-01-01 RX ADMIN — HEPARIN SODIUM 5000 UNITS: 5000 INJECTION INTRAVENOUS; SUBCUTANEOUS at 15:11

## 2019-01-01 RX ADMIN — INSULIN GLARGINE 20 UNITS: 100 INJECTION, SOLUTION SUBCUTANEOUS at 21:39

## 2019-01-01 RX ADMIN — MUPIROCIN: 20 OINTMENT TOPICAL at 21:16

## 2019-01-01 RX ADMIN — MORPHINE SULFATE 2 MG: 2 INJECTION, SOLUTION INTRAMUSCULAR; INTRAVENOUS at 04:39

## 2019-01-01 RX ADMIN — DOCUSATE SODIUM 100 MG: 100 CAPSULE, LIQUID FILLED ORAL at 21:11

## 2019-01-01 RX ADMIN — VANCOMYCIN HYDROCHLORIDE 500 MG: 10 INJECTION, POWDER, LYOPHILIZED, FOR SOLUTION INTRAVENOUS at 15:29

## 2019-01-01 RX ADMIN — PIPERACILLIN SODIUM,TAZOBACTAM SODIUM 3.38 G: 3; .375 INJECTION, POWDER, FOR SOLUTION INTRAVENOUS at 18:41

## 2019-01-01 RX ADMIN — OXYCODONE HYDROCHLORIDE AND ACETAMINOPHEN 1 TABLET: 7.5; 325 TABLET ORAL at 06:26

## 2019-01-01 RX ADMIN — OXYCODONE HYDROCHLORIDE AND ACETAMINOPHEN 1 TABLET: 7.5; 325 TABLET ORAL at 20:40

## 2019-01-01 RX ADMIN — LIDOCAINE HYDROCHLORIDE 5 ML: 10 INJECTION, SOLUTION EPIDURAL; INFILTRATION; INTRACAUDAL; PERINEURAL at 19:59

## 2019-01-01 RX ADMIN — ONDANSETRON 4 MG: 2 INJECTION INTRAMUSCULAR; INTRAVENOUS at 13:34

## 2019-01-01 RX ADMIN — OXYCODONE HYDROCHLORIDE 15 MG: 15 TABLET ORAL at 19:54

## 2019-01-01 RX ADMIN — CIPROFLOXACIN 500 MG: 500 TABLET, FILM COATED ORAL at 20:58

## 2019-01-01 RX ADMIN — INSULIN LISPRO 1 UNITS: 100 INJECTION, SOLUTION INTRAVENOUS; SUBCUTANEOUS at 21:01

## 2019-01-01 RX ADMIN — ACETAMINOPHEN 1000 MG: 500 TABLET ORAL at 13:49

## 2019-01-01 RX ADMIN — NEPHROCAP 1 MG: 1 CAP ORAL at 21:29

## 2019-01-01 RX ADMIN — INSULIN LISPRO 1 UNITS: 100 INJECTION, SOLUTION INTRAVENOUS; SUBCUTANEOUS at 21:38

## 2019-01-01 RX ADMIN — MORPHINE SULFATE 2 MG: 2 INJECTION, SOLUTION INTRAMUSCULAR; INTRAVENOUS at 09:21

## 2019-01-01 RX ADMIN — SODIUM CHLORIDE: 9 INJECTION, SOLUTION INTRAVENOUS at 19:08

## 2019-01-01 RX ADMIN — HEPARIN SODIUM 5000 UNITS: 5000 INJECTION INTRAVENOUS; SUBCUTANEOUS at 06:49

## 2019-01-01 RX ADMIN — FAMOTIDINE 20 MG: 20 TABLET ORAL at 08:38

## 2019-01-01 RX ADMIN — INSULIN LISPRO 5 UNITS: 100 INJECTION, SOLUTION INTRAVENOUS; SUBCUTANEOUS at 09:08

## 2019-01-01 RX ADMIN — DOCUSATE SODIUM 100 MG: 100 CAPSULE, LIQUID FILLED ORAL at 20:09

## 2019-01-01 RX ADMIN — HYDROMORPHONE HYDROCHLORIDE 0.5 MG: 1 INJECTION, SOLUTION INTRAMUSCULAR; INTRAVENOUS; SUBCUTANEOUS at 17:06

## 2019-01-01 RX ADMIN — VANCOMYCIN HYDROCHLORIDE 2000 MG: 10 INJECTION, POWDER, LYOPHILIZED, FOR SOLUTION INTRAVENOUS at 19:52

## 2019-01-01 RX ADMIN — CYCLOBENZAPRINE HYDROCHLORIDE 10 MG: 10 TABLET, FILM COATED ORAL at 20:40

## 2019-01-01 RX ADMIN — SEVELAMER CARBONATE 3200 MG: 800 TABLET, FILM COATED ORAL at 11:16

## 2019-01-01 RX ADMIN — INSULIN GLARGINE 30 UNITS: 100 INJECTION, SOLUTION SUBCUTANEOUS at 21:12

## 2019-01-01 RX ADMIN — METRONIDAZOLE 500 MG: 500 TABLET ORAL at 15:53

## 2019-01-01 RX ADMIN — LEVOTHYROXINE SODIUM 50 MCG: 50 TABLET ORAL at 05:57

## 2019-01-01 RX ADMIN — OXYCODONE HYDROCHLORIDE AND ACETAMINOPHEN 1 TABLET: 7.5; 325 TABLET ORAL at 13:30

## 2019-01-01 RX ADMIN — FENTANYL CITRATE 50 MCG: 50 INJECTION INTRAMUSCULAR; INTRAVENOUS at 16:11

## 2019-01-01 RX ADMIN — HYOSCYAMINE SULFATE: 16 SOLUTION at 12:53

## 2019-01-01 RX ADMIN — BUMETANIDE 2 MG: 2 TABLET ORAL at 12:54

## 2019-01-01 RX ADMIN — MORPHINE SULFATE 2 MG: 2 INJECTION, SOLUTION INTRAMUSCULAR; INTRAVENOUS at 20:46

## 2019-01-01 RX ADMIN — Medication 10 ML: at 20:41

## 2019-01-01 RX ADMIN — Medication 10 ML: at 21:01

## 2019-01-01 RX ADMIN — PRAVASTATIN SODIUM 20 MG: 20 TABLET ORAL at 20:19

## 2019-01-01 RX ADMIN — INSULIN LISPRO 1 UNITS: 100 INJECTION, SOLUTION INTRAVENOUS; SUBCUTANEOUS at 08:11

## 2019-01-01 RX ADMIN — NYSTATIN: 100000 CREAM TOPICAL at 15:24

## 2019-01-01 RX ADMIN — HEPARIN SODIUM 5000 UNITS: 5000 INJECTION INTRAVENOUS; SUBCUTANEOUS at 13:49

## 2019-01-01 RX ADMIN — HEPARIN SODIUM 5000 UNITS: 5000 INJECTION INTRAVENOUS; SUBCUTANEOUS at 06:31

## 2019-01-01 RX ADMIN — PIPERACILLIN SODIUM,TAZOBACTAM SODIUM 3.38 G: 3; .375 INJECTION, POWDER, FOR SOLUTION INTRAVENOUS at 21:09

## 2019-01-01 RX ADMIN — OXYCODONE HYDROCHLORIDE AND ACETAMINOPHEN 2 TABLET: 5; 325 TABLET ORAL at 02:02

## 2019-01-01 RX ADMIN — INSULIN LISPRO 5 UNITS: 100 INJECTION, SOLUTION INTRAVENOUS; SUBCUTANEOUS at 17:50

## 2019-01-01 RX ADMIN — CIPROFLOXACIN 200 MG: 2 INJECTION, SOLUTION INTRAVENOUS at 15:05

## 2019-01-01 RX ADMIN — DOCUSATE SODIUM 100 MG: 100 CAPSULE, LIQUID FILLED ORAL at 08:12

## 2019-01-01 RX ADMIN — Medication 10 ML: at 08:16

## 2019-01-01 RX ADMIN — HEPARIN SODIUM 5000 UNITS: 5000 INJECTION INTRAVENOUS; SUBCUTANEOUS at 15:53

## 2019-01-01 RX ADMIN — Medication 10 ML: at 09:23

## 2019-01-01 RX ADMIN — OXYCODONE HYDROCHLORIDE 15 MG: 15 TABLET ORAL at 13:49

## 2019-01-01 RX ADMIN — PRAVASTATIN SODIUM 20 MG: 20 TABLET ORAL at 19:54

## 2019-01-01 RX ADMIN — INSULIN LISPRO 1 UNITS: 100 INJECTION, SOLUTION INTRAVENOUS; SUBCUTANEOUS at 20:45

## 2019-01-01 RX ADMIN — CALCIUM GLUCONATE 2 G: 98 INJECTION, SOLUTION INTRAVENOUS at 19:30

## 2019-01-01 RX ADMIN — VANCOMYCIN HYDROCHLORIDE 500 MG: 10 INJECTION, POWDER, LYOPHILIZED, FOR SOLUTION INTRAVENOUS at 12:46

## 2019-01-01 RX ADMIN — FAMOTIDINE 20 MG: 20 TABLET ORAL at 08:12

## 2019-01-01 RX ADMIN — SENNOSIDES AND DOCUSATE SODIUM 2 TABLET: 8.6; 5 TABLET ORAL at 09:04

## 2019-01-01 RX ADMIN — NEPHROCAP 1 MG: 1 CAP ORAL at 22:28

## 2019-01-01 RX ADMIN — MORPHINE SULFATE 2 MG: 2 INJECTION, SOLUTION INTRAMUSCULAR; INTRAVENOUS at 20:06

## 2019-01-01 RX ADMIN — PROPOFOL 50 MG: 10 INJECTION, EMULSION INTRAVENOUS at 14:30

## 2019-01-01 RX ADMIN — INSULIN GLARGINE 20 UNITS: 100 INJECTION, SOLUTION SUBCUTANEOUS at 13:07

## 2019-01-01 RX ADMIN — OXYCODONE HYDROCHLORIDE AND ACETAMINOPHEN 1 TABLET: 7.5; 325 TABLET ORAL at 20:10

## 2019-01-01 RX ADMIN — CIPROFLOXACIN 500 MG: 500 TABLET, FILM COATED ORAL at 08:14

## 2019-01-01 RX ADMIN — SEVELAMER CARBONATE 3200 MG: 800 TABLET, FILM COATED ORAL at 09:21

## 2019-01-01 RX ADMIN — INSULIN LISPRO 5 UNITS: 100 INJECTION, SOLUTION INTRAVENOUS; SUBCUTANEOUS at 13:06

## 2019-01-01 RX ADMIN — HYOSCYAMINE SULFATE: 16 SOLUTION at 08:14

## 2019-01-01 RX ADMIN — ACETAMINOPHEN 1000 MG: 500 TABLET ORAL at 22:28

## 2019-01-01 RX ADMIN — MORPHINE SULFATE 2 MG: 2 INJECTION, SOLUTION INTRAMUSCULAR; INTRAVENOUS at 20:35

## 2019-01-01 RX ADMIN — AMLODIPINE BESYLATE 10 MG: 10 TABLET ORAL at 07:57

## 2019-01-01 RX ADMIN — OXYCODONE HYDROCHLORIDE AND ACETAMINOPHEN 1 TABLET: 7.5; 325 TABLET ORAL at 13:41

## 2019-01-01 RX ADMIN — OXYCODONE HYDROCHLORIDE AND ACETAMINOPHEN 1 TABLET: 7.5; 325 TABLET ORAL at 13:49

## 2019-01-01 RX ADMIN — INSULIN GLARGINE 30 UNITS: 100 INJECTION, SOLUTION SUBCUTANEOUS at 22:35

## 2019-01-01 RX ADMIN — HYDROMORPHONE HYDROCHLORIDE 0.5 MG: 1 INJECTION, SOLUTION INTRAMUSCULAR; INTRAVENOUS; SUBCUTANEOUS at 16:23

## 2019-01-01 RX ADMIN — INSULIN GLARGINE 20 UNITS: 100 INJECTION, SOLUTION SUBCUTANEOUS at 20:46

## 2019-01-01 RX ADMIN — MORPHINE SULFATE 2 MG: 2 INJECTION, SOLUTION INTRAMUSCULAR; INTRAVENOUS at 03:25

## 2019-01-01 RX ADMIN — PANTOPRAZOLE SODIUM 40 MG: 40 TABLET, DELAYED RELEASE ORAL at 06:26

## 2019-01-01 RX ADMIN — NEPHROCAP 1 MG: 1 CAP ORAL at 21:04

## 2019-01-01 RX ADMIN — VANCOMYCIN HYDROCHLORIDE 750 MG: 10 INJECTION, POWDER, LYOPHILIZED, FOR SOLUTION INTRAVENOUS at 15:04

## 2019-01-01 RX ADMIN — INSULIN LISPRO 5 UNITS: 100 INJECTION, SOLUTION INTRAVENOUS; SUBCUTANEOUS at 19:10

## 2019-01-01 RX ADMIN — DOCUSATE SODIUM 100 MG: 100 CAPSULE, LIQUID FILLED ORAL at 08:33

## 2019-01-01 RX ADMIN — COLLAGENASE SANTYL: 250 OINTMENT TOPICAL at 09:03

## 2019-01-01 RX ADMIN — HYDROMORPHONE HYDROCHLORIDE 0.25 MG: 1 INJECTION, SOLUTION INTRAMUSCULAR; INTRAVENOUS; SUBCUTANEOUS at 20:40

## 2019-01-01 RX ADMIN — SEVELAMER CARBONATE 3200 MG: 800 TABLET, FILM COATED ORAL at 17:49

## 2019-01-01 RX ADMIN — INSULIN LISPRO 2 UNITS: 100 INJECTION, SOLUTION INTRAVENOUS; SUBCUTANEOUS at 11:52

## 2019-01-01 RX ADMIN — INSULIN HUMAN 10 UNITS: 100 INJECTION, SOLUTION PARENTERAL at 18:17

## 2019-01-01 RX ADMIN — SENNOSIDES AND DOCUSATE SODIUM 2 TABLET: 8.6; 5 TABLET ORAL at 19:54

## 2019-01-01 RX ADMIN — HYDROMORPHONE HYDROCHLORIDE 0.5 MG: 1 INJECTION, SOLUTION INTRAMUSCULAR; INTRAVENOUS; SUBCUTANEOUS at 16:11

## 2019-01-01 RX ADMIN — Medication 10 ML: at 08:14

## 2019-01-01 RX ADMIN — Medication 10 ML: at 21:09

## 2019-01-01 RX ADMIN — Medication 10 ML: at 21:13

## 2019-01-01 RX ADMIN — ASPIRIN 81 MG: 81 TABLET, COATED ORAL at 13:02

## 2019-01-01 RX ADMIN — ONDANSETRON 4 MG: 2 INJECTION INTRAMUSCULAR; INTRAVENOUS at 20:55

## 2019-01-01 RX ADMIN — OXYCODONE HYDROCHLORIDE 15 MG: 15 TABLET ORAL at 13:03

## 2019-01-01 RX ADMIN — INSULIN LISPRO 4 UNITS: 100 INJECTION, SOLUTION INTRAVENOUS; SUBCUTANEOUS at 17:26

## 2019-01-01 RX ADMIN — LEVOTHYROXINE SODIUM 50 MCG: 50 TABLET ORAL at 06:24

## 2019-01-01 RX ADMIN — FENTANYL CITRATE 50 MCG: 50 INJECTION INTRAMUSCULAR; INTRAVENOUS at 16:36

## 2019-01-01 RX ADMIN — COLLAGENASE SANTYL: 250 OINTMENT TOPICAL at 08:38

## 2019-01-01 RX ADMIN — SEVELAMER CARBONATE 3200 MG: 800 TABLET, FILM COATED ORAL at 18:44

## 2019-01-01 RX ADMIN — PANTOPRAZOLE SODIUM 40 MG: 40 INJECTION, POWDER, FOR SOLUTION INTRAVENOUS at 15:41

## 2019-01-01 RX ADMIN — NEPHROCAP 1 MG: 1 CAP ORAL at 12:22

## 2019-01-01 RX ADMIN — SEVELAMER CARBONATE 3200 MG: 800 TABLET, FILM COATED ORAL at 12:22

## 2019-01-01 RX ADMIN — AMLODIPINE BESYLATE 10 MG: 10 TABLET ORAL at 08:38

## 2019-01-01 RX ADMIN — MUPIROCIN: 20 OINTMENT TOPICAL at 13:43

## 2019-01-01 RX ADMIN — FENTANYL CITRATE 50 MCG: 50 INJECTION INTRAMUSCULAR; INTRAVENOUS at 16:42

## 2019-01-01 RX ADMIN — CIPROFLOXACIN HYDROCHLORIDE 500 MG: 500 TABLET, FILM COATED ORAL at 19:08

## 2019-01-01 RX ADMIN — AMLODIPINE BESYLATE 10 MG: 10 TABLET ORAL at 08:35

## 2019-01-01 RX ADMIN — SODIUM CHLORIDE, SODIUM LACTATE, POTASSIUM CHLORIDE, AND CALCIUM CHLORIDE: 600; 310; 30; 20 INJECTION, SOLUTION INTRAVENOUS at 17:00

## 2019-01-01 RX ADMIN — NYSTATIN: 100000 CREAM TOPICAL at 19:58

## 2019-01-01 RX ADMIN — PIPERACILLIN SODIUM,TAZOBACTAM SODIUM 3.38 G: 3; .375 INJECTION, POWDER, FOR SOLUTION INTRAVENOUS at 06:28

## 2019-01-01 RX ADMIN — SEVELAMER CARBONATE 3200 MG: 800 TABLET, FILM COATED ORAL at 12:46

## 2019-01-01 RX ADMIN — DARBEPOETIN ALFA 60 MCG: 60 INJECTION, SOLUTION INTRAVENOUS; SUBCUTANEOUS at 11:49

## 2019-01-01 RX ADMIN — FAMOTIDINE 20 MG: 20 TABLET ORAL at 13:02

## 2019-01-01 RX ADMIN — AMLODIPINE BESYLATE 10 MG: 10 TABLET ORAL at 16:39

## 2019-01-01 RX ADMIN — OXYCODONE HYDROCHLORIDE AND ACETAMINOPHEN 1 TABLET: 5; 325 TABLET ORAL at 16:46

## 2019-01-01 RX ADMIN — PIPERACILLIN SODIUM,TAZOBACTAM SODIUM 3.38 G: 3; .375 INJECTION, POWDER, FOR SOLUTION INTRAVENOUS at 18:29

## 2019-01-01 RX ADMIN — DOCUSATE SODIUM 100 MG: 100 CAPSULE, LIQUID FILLED ORAL at 13:41

## 2019-01-01 RX ADMIN — PRAVASTATIN SODIUM 20 MG: 20 TABLET ORAL at 22:11

## 2019-01-01 RX ADMIN — INSULIN LISPRO 5 UNITS: 100 INJECTION, SOLUTION INTRAVENOUS; SUBCUTANEOUS at 17:09

## 2019-01-01 RX ADMIN — ASPIRIN 81 MG CHEWABLE TABLET 81 MG: 81 TABLET CHEWABLE at 08:12

## 2019-01-01 RX ADMIN — OXYCODONE HYDROCHLORIDE AND ACETAMINOPHEN 1 TABLET: 7.5; 325 TABLET ORAL at 04:53

## 2019-01-01 RX ADMIN — EPOETIN ALFA-EPBX 10000 UNITS: 10000 INJECTION, SOLUTION INTRAVENOUS; SUBCUTANEOUS at 10:33

## 2019-01-01 RX ADMIN — INSULIN LISPRO 1 UNITS: 100 INJECTION, SOLUTION INTRAVENOUS; SUBCUTANEOUS at 18:56

## 2019-01-01 RX ADMIN — HEPARIN SODIUM 5000 UNITS: 5000 INJECTION INTRAVENOUS; SUBCUTANEOUS at 15:19

## 2019-01-01 RX ADMIN — LIDOCAINE HYDROCHLORIDE,EPINEPHRINE BITARTRATE 20 ML: 10; .01 INJECTION, SOLUTION INFILTRATION; PERINEURAL at 12:34

## 2019-01-01 RX ADMIN — SODIUM CHLORIDE, POTASSIUM CHLORIDE, SODIUM LACTATE AND CALCIUM CHLORIDE: 600; 310; 30; 20 INJECTION, SOLUTION INTRAVENOUS at 01:20

## 2019-01-01 RX ADMIN — AMLODIPINE BESYLATE 10 MG: 10 TABLET ORAL at 08:01

## 2019-01-01 RX ADMIN — SEVELAMER CARBONATE 3200 MG: 800 TABLET, FILM COATED ORAL at 08:38

## 2019-01-01 RX ADMIN — HEPARIN SODIUM 5000 UNITS: 5000 INJECTION INTRAVENOUS; SUBCUTANEOUS at 23:49

## 2019-01-01 RX ADMIN — DOCUSATE SODIUM 100 MG: 100 CAPSULE, LIQUID FILLED ORAL at 21:07

## 2019-01-01 RX ADMIN — Medication 5 MG: at 09:20

## 2019-01-01 RX ADMIN — AMLODIPINE BESYLATE 10 MG: 10 TABLET ORAL at 09:04

## 2019-01-01 RX ADMIN — PROPOFOL 50 MG: 10 INJECTION, EMULSION INTRAVENOUS at 14:39

## 2019-01-01 RX ADMIN — OXYCODONE HYDROCHLORIDE AND ACETAMINOPHEN 1 TABLET: 7.5; 325 TABLET ORAL at 06:39

## 2019-01-01 RX ADMIN — OXYCODONE HYDROCHLORIDE AND ACETAMINOPHEN 1 TABLET: 7.5; 325 TABLET ORAL at 02:04

## 2019-01-01 RX ADMIN — AMLODIPINE BESYLATE 10 MG: 10 TABLET ORAL at 08:33

## 2019-01-01 RX ADMIN — AMLODIPINE BESYLATE 10 MG: 10 TABLET ORAL at 13:41

## 2019-01-01 RX ADMIN — PRAVASTATIN SODIUM 20 MG: 20 TABLET ORAL at 20:47

## 2019-01-01 RX ADMIN — BUMETANIDE 2 MG: 2 TABLET ORAL at 13:03

## 2019-01-01 RX ADMIN — SENNOSIDES AND DOCUSATE SODIUM 1 TABLET: 8.6; 5 TABLET ORAL at 20:40

## 2019-01-01 RX ADMIN — HEPARIN SODIUM 5000 UNITS: 5000 INJECTION INTRAVENOUS; SUBCUTANEOUS at 15:12

## 2019-01-01 RX ADMIN — SEVELAMER CARBONATE 3200 MG: 800 TABLET, FILM COATED ORAL at 12:05

## 2019-01-01 RX ADMIN — HYDROMORPHONE HYDROCHLORIDE 0.25 MG: 1 INJECTION, SOLUTION INTRAMUSCULAR; INTRAVENOUS; SUBCUTANEOUS at 10:48

## 2019-01-01 RX ADMIN — PROPOFOL 50 MG: 10 INJECTION, EMULSION INTRAVENOUS at 14:28

## 2019-01-01 RX ADMIN — FENTANYL CITRATE 25 MCG: 50 INJECTION INTRAMUSCULAR; INTRAVENOUS at 15:53

## 2019-01-01 RX ADMIN — METRONIDAZOLE 500 MG: 500 TABLET ORAL at 04:53

## 2019-01-01 RX ADMIN — LEVOTHYROXINE SODIUM 50 MCG: 50 TABLET ORAL at 19:08

## 2019-01-01 RX ADMIN — MINERAL OIL: 1000 LIQUID ORAL at 17:00

## 2019-01-01 RX ADMIN — PRAVASTATIN SODIUM 20 MG: 20 TABLET ORAL at 21:08

## 2019-01-01 RX ADMIN — SEVELAMER CARBONATE 3200 MG: 800 TABLET, FILM COATED ORAL at 08:36

## 2019-01-01 RX ADMIN — HYDROMORPHONE HYDROCHLORIDE 0.5 MG: 1 INJECTION, SOLUTION INTRAMUSCULAR; INTRAVENOUS; SUBCUTANEOUS at 17:00

## 2019-01-01 RX ADMIN — INSULIN LISPRO 2 UNITS: 100 INJECTION, SOLUTION INTRAVENOUS; SUBCUTANEOUS at 22:01

## 2019-01-01 RX ADMIN — ONDANSETRON 4 MG: 2 INJECTION INTRAMUSCULAR; INTRAVENOUS at 14:44

## 2019-01-01 RX ADMIN — BUMETANIDE 2 MG: 2 TABLET ORAL at 22:22

## 2019-01-01 RX ADMIN — PIPERACILLIN SODIUM,TAZOBACTAM SODIUM 3.38 G: 3; .375 INJECTION, POWDER, FOR SOLUTION INTRAVENOUS at 17:30

## 2019-01-01 RX ADMIN — HYDROMORPHONE HYDROCHLORIDE 0.5 MG: 1 INJECTION, SOLUTION INTRAMUSCULAR; INTRAVENOUS; SUBCUTANEOUS at 07:07

## 2019-01-01 RX ADMIN — INSULIN LISPRO 4 UNITS: 100 INJECTION, SOLUTION INTRAVENOUS; SUBCUTANEOUS at 19:10

## 2019-01-01 RX ADMIN — HYDROMORPHONE HYDROCHLORIDE 0.25 MG: 1 INJECTION, SOLUTION INTRAMUSCULAR; INTRAVENOUS; SUBCUTANEOUS at 17:43

## 2019-01-01 RX ADMIN — HEPARIN SODIUM 5000 UNITS: 5000 INJECTION INTRAVENOUS; SUBCUTANEOUS at 06:25

## 2019-01-01 RX ADMIN — FLUCONAZOLE 100 MG: 200 TABLET ORAL at 15:03

## 2019-01-01 RX ADMIN — INSULIN LISPRO 3 UNITS: 100 INJECTION, SOLUTION INTRAVENOUS; SUBCUTANEOUS at 19:14

## 2019-01-01 RX ADMIN — ACETAMINOPHEN 1000 MG: 500 TABLET ORAL at 21:30

## 2019-01-01 RX ADMIN — ASPIRIN 81 MG CHEWABLE TABLET 81 MG: 81 TABLET CHEWABLE at 08:33

## 2019-01-01 RX ADMIN — OXYCODONE HYDROCHLORIDE AND ACETAMINOPHEN 2 TABLET: 5; 325 TABLET ORAL at 10:08

## 2019-01-01 RX ADMIN — INSULIN GLARGINE 30 UNITS: 100 INJECTION, SOLUTION SUBCUTANEOUS at 22:02

## 2019-01-01 RX ADMIN — INSULIN LISPRO 2 UNITS: 100 INJECTION, SOLUTION INTRAVENOUS; SUBCUTANEOUS at 17:31

## 2019-01-01 RX ADMIN — HYDROMORPHONE HYDROCHLORIDE 0.5 MG: 1 INJECTION, SOLUTION INTRAMUSCULAR; INTRAVENOUS; SUBCUTANEOUS at 07:57

## 2019-01-01 RX ADMIN — VANCOMYCIN HYDROCHLORIDE 1250 MG: 10 INJECTION, POWDER, LYOPHILIZED, FOR SOLUTION INTRAVENOUS at 15:54

## 2019-01-01 RX ADMIN — Medication 10 ML: at 20:11

## 2019-01-01 RX ADMIN — Medication 0.5 MG: at 12:33

## 2019-01-01 RX ADMIN — INSULIN LISPRO 5 UNITS: 100 INJECTION, SOLUTION INTRAVENOUS; SUBCUTANEOUS at 20:20

## 2019-01-01 RX ADMIN — BUMETANIDE 2 MG: 2 TABLET ORAL at 21:00

## 2019-01-01 RX ADMIN — HEPARIN SODIUM 5000 UNITS: 5000 INJECTION INTRAVENOUS; SUBCUTANEOUS at 14:10

## 2019-01-01 RX ADMIN — PANTOPRAZOLE SODIUM 40 MG: 40 INJECTION, POWDER, FOR SOLUTION INTRAVENOUS at 08:37

## 2019-01-01 RX ADMIN — OXYCODONE HYDROCHLORIDE AND ACETAMINOPHEN 1 TABLET: 7.5; 325 TABLET ORAL at 23:27

## 2019-01-01 RX ADMIN — COLLAGENASE SANTYL: 250 OINTMENT TOPICAL at 07:54

## 2019-01-01 RX ADMIN — HEPARIN SODIUM 5000 UNITS: 5000 INJECTION INTRAVENOUS; SUBCUTANEOUS at 06:12

## 2019-01-01 RX ADMIN — SEVELAMER CARBONATE 3200 MG: 800 TABLET, FILM COATED ORAL at 15:52

## 2019-01-01 RX ADMIN — INSULIN LISPRO 5 UNITS: 100 INJECTION, SOLUTION INTRAVENOUS; SUBCUTANEOUS at 17:26

## 2019-01-01 RX ADMIN — FENTANYL CITRATE 100 MCG: 50 INJECTION INTRAMUSCULAR; INTRAVENOUS at 14:54

## 2019-01-01 RX ADMIN — AMLODIPINE BESYLATE 10 MG: 10 TABLET ORAL at 08:37

## 2019-01-01 RX ADMIN — MORPHINE SULFATE 2 MG: 2 INJECTION, SOLUTION INTRAMUSCULAR; INTRAVENOUS at 20:57

## 2019-01-01 RX ADMIN — PIPERACILLIN SODIUM,TAZOBACTAM SODIUM 3.38 G: 3; .375 INJECTION, POWDER, FOR SOLUTION INTRAVENOUS at 19:44

## 2019-01-01 RX ADMIN — FAMOTIDINE 20 MG: 20 TABLET ORAL at 19:08

## 2019-01-01 RX ADMIN — TRIAMCINOLONE ACETONIDE: 1 OINTMENT TOPICAL at 19:58

## 2019-01-01 RX ADMIN — Medication 10 ML: at 20:12

## 2019-01-01 RX ADMIN — INSULIN LISPRO 1 UNITS: 100 INJECTION, SOLUTION INTRAVENOUS; SUBCUTANEOUS at 21:32

## 2019-01-01 RX ADMIN — HEPARIN SODIUM 5000 UNITS: 5000 INJECTION INTRAVENOUS; SUBCUTANEOUS at 22:40

## 2019-01-01 RX ADMIN — CYCLOBENZAPRINE HYDROCHLORIDE 10 MG: 10 TABLET, FILM COATED ORAL at 19:53

## 2019-01-01 RX ADMIN — Medication 10 ML: at 13:04

## 2019-01-01 RX ADMIN — SEVELAMER CARBONATE 3200 MG: 800 TABLET, FILM COATED ORAL at 17:07

## 2019-01-01 RX ADMIN — INSULIN GLARGINE 30 UNITS: 100 INJECTION, SOLUTION SUBCUTANEOUS at 21:19

## 2019-01-01 RX ADMIN — SEVELAMER CARBONATE 3200 MG: 800 TABLET, FILM COATED ORAL at 18:03

## 2019-01-01 RX ADMIN — CIPROFLOXACIN HYDROCHLORIDE 500 MG: 500 TABLET, FILM COATED ORAL at 09:05

## 2019-01-01 RX ADMIN — HYDROMORPHONE HYDROCHLORIDE 1 MG: 1 INJECTION, SOLUTION INTRAMUSCULAR; INTRAVENOUS; SUBCUTANEOUS at 22:15

## 2019-01-01 RX ADMIN — HEPARIN SODIUM 5000 UNITS: 5000 INJECTION INTRAVENOUS; SUBCUTANEOUS at 06:29

## 2019-01-01 RX ADMIN — INSULIN LISPRO 1 UNITS: 100 INJECTION, SOLUTION INTRAVENOUS; SUBCUTANEOUS at 22:35

## 2019-01-01 RX ADMIN — SEVELAMER CARBONATE 3200 MG: 800 TABLET, FILM COATED ORAL at 21:29

## 2019-01-01 RX ADMIN — FENTANYL CITRATE 50 MCG: 50 INJECTION INTRAMUSCULAR; INTRAVENOUS at 14:25

## 2019-01-01 RX ADMIN — VANCOMYCIN HYDROCHLORIDE 750 MG: 10 INJECTION, POWDER, LYOPHILIZED, FOR SOLUTION INTRAVENOUS at 13:29

## 2019-01-01 RX ADMIN — LIDOCAINE HYDROCHLORIDE 2.5 ML: 40 SOLUTION TOPICAL at 11:22

## 2019-01-01 RX ADMIN — DARBEPOETIN ALFA 40 MCG: 40 INJECTION, SOLUTION INTRAVENOUS; SUBCUTANEOUS at 14:41

## 2019-01-01 RX ADMIN — INSULIN GLARGINE 30 UNITS: 100 INJECTION, SOLUTION SUBCUTANEOUS at 20:21

## 2019-01-01 RX ADMIN — INSULIN GLARGINE 30 UNITS: 100 INJECTION, SOLUTION SUBCUTANEOUS at 22:32

## 2019-01-01 RX ADMIN — INSULIN LISPRO 6 UNITS: 100 INJECTION, SOLUTION INTRAVENOUS; SUBCUTANEOUS at 17:05

## 2019-01-01 RX ADMIN — OXYCODONE HYDROCHLORIDE 15 MG: 15 TABLET ORAL at 04:29

## 2019-01-01 RX ADMIN — AMLODIPINE BESYLATE 10 MG: 10 TABLET ORAL at 08:12

## 2019-01-01 RX ADMIN — VANCOMYCIN HYDROCHLORIDE 2000 MG: 10 INJECTION, POWDER, LYOPHILIZED, FOR SOLUTION INTRAVENOUS at 14:38

## 2019-01-01 RX ADMIN — HYDROMORPHONE HYDROCHLORIDE 0.5 MG: 1 INJECTION, SOLUTION INTRAMUSCULAR; INTRAVENOUS; SUBCUTANEOUS at 18:03

## 2019-01-01 RX ADMIN — DOCUSATE SODIUM 100 MG: 100 CAPSULE, LIQUID FILLED ORAL at 08:37

## 2019-01-01 RX ADMIN — CYCLOBENZAPRINE HYDROCHLORIDE 10 MG: 10 TABLET, FILM COATED ORAL at 20:19

## 2019-01-01 RX ADMIN — DOCUSATE SODIUM 100 MG: 100 CAPSULE, LIQUID FILLED ORAL at 08:01

## 2019-01-01 RX ADMIN — HYDROMORPHONE HYDROCHLORIDE 0.5 MG: 1 INJECTION, SOLUTION INTRAMUSCULAR; INTRAVENOUS; SUBCUTANEOUS at 15:59

## 2019-01-01 RX ADMIN — LIDOCAINE HYDROCHLORIDE: 40 SOLUTION TOPICAL at 15:36

## 2019-01-01 RX ADMIN — PIPERACILLIN SODIUM,TAZOBACTAM SODIUM 3.38 G: 3; .375 INJECTION, POWDER, FOR SOLUTION INTRAVENOUS at 06:06

## 2019-01-01 RX ADMIN — TRIAMCINOLONE ACETONIDE: 1 OINTMENT TOPICAL at 09:19

## 2019-01-01 RX ADMIN — SODIUM CHLORIDE, SODIUM LACTATE, POTASSIUM CHLORIDE, AND CALCIUM CHLORIDE: 600; 310; 30; 20 INJECTION, SOLUTION INTRAVENOUS at 03:29

## 2019-01-01 RX ADMIN — DOCUSATE SODIUM 100 MG: 100 CAPSULE, LIQUID FILLED ORAL at 20:19

## 2019-01-01 RX ADMIN — PRAVASTATIN SODIUM 20 MG: 20 TABLET ORAL at 19:50

## 2019-01-01 RX ADMIN — HYDROMORPHONE HYDROCHLORIDE 0.5 MG: 1 INJECTION, SOLUTION INTRAMUSCULAR; INTRAVENOUS; SUBCUTANEOUS at 18:26

## 2019-01-01 RX ADMIN — SENNOSIDES 17.2 MG: 8.6 TABLET, FILM COATED ORAL at 20:47

## 2019-01-01 RX ADMIN — PIPERACILLIN SODIUM,TAZOBACTAM SODIUM 3.38 G: 3; .375 INJECTION, POWDER, FOR SOLUTION INTRAVENOUS at 06:22

## 2019-01-01 RX ADMIN — FAMOTIDINE 20 MG: 20 TABLET ORAL at 21:07

## 2019-01-01 RX ADMIN — Medication 5 MG: at 13:05

## 2019-01-01 RX ADMIN — Medication 10 ML: at 07:57

## 2019-01-01 RX ADMIN — MORPHINE SULFATE 2 MG: 2 INJECTION, SOLUTION INTRAMUSCULAR; INTRAVENOUS at 20:11

## 2019-01-01 RX ADMIN — HEPARIN SODIUM 5000 UNITS: 5000 INJECTION INTRAVENOUS; SUBCUTANEOUS at 22:34

## 2019-01-01 RX ADMIN — CYCLOBENZAPRINE HYDROCHLORIDE 10 MG: 10 TABLET, FILM COATED ORAL at 19:50

## 2019-01-01 RX ADMIN — SODIUM CHLORIDE: 900 INJECTION, SOLUTION INTRAVENOUS at 14:20

## 2019-01-01 RX ADMIN — DEXTROSE MONOHYDRATE: 5 INJECTION, SOLUTION INTRAVENOUS at 14:52

## 2019-01-01 RX ADMIN — FAMOTIDINE 20 MG: 20 TABLET ORAL at 09:04

## 2019-01-01 RX ADMIN — SEVELAMER CARBONATE 3200 MG: 800 TABLET, FILM COATED ORAL at 09:04

## 2019-01-01 RX ADMIN — LIDOCAINE HYDROCHLORIDE 2.5 ML: 40 SOLUTION TOPICAL at 14:06

## 2019-01-01 RX ADMIN — INSULIN GLARGINE 20 UNITS: 100 INJECTION, SOLUTION SUBCUTANEOUS at 08:41

## 2019-01-01 RX ADMIN — OXYCODONE HYDROCHLORIDE 15 MG: 15 TABLET ORAL at 17:54

## 2019-01-01 RX ADMIN — LEVOTHYROXINE SODIUM 50 MCG: 50 TABLET ORAL at 06:17

## 2019-01-01 RX ADMIN — VANCOMYCIN HYDROCHLORIDE 1000 MG: 10 INJECTION, POWDER, LYOPHILIZED, FOR SOLUTION INTRAVENOUS at 12:58

## 2019-01-01 RX ADMIN — OXYCODONE HYDROCHLORIDE AND ACETAMINOPHEN 1 TABLET: 7.5; 325 TABLET ORAL at 13:09

## 2019-01-01 RX ADMIN — INSULIN GLARGINE 20 UNITS: 100 INJECTION, SOLUTION SUBCUTANEOUS at 09:15

## 2019-01-01 RX ADMIN — LEVOTHYROXINE SODIUM 50 MCG: 50 TABLET ORAL at 06:40

## 2019-01-01 RX ADMIN — CYCLOBENZAPRINE HYDROCHLORIDE 10 MG: 10 TABLET, FILM COATED ORAL at 21:11

## 2019-01-01 RX ADMIN — OXYCODONE HYDROCHLORIDE 15 MG: 15 TABLET ORAL at 17:57

## 2019-01-01 RX ADMIN — INSULIN LISPRO 1 UNITS: 100 INJECTION, SOLUTION INTRAVENOUS; SUBCUTANEOUS at 21:51

## 2019-01-01 RX ADMIN — HYDROMORPHONE HYDROCHLORIDE 0.25 MG: 1 INJECTION, SOLUTION INTRAMUSCULAR; INTRAVENOUS; SUBCUTANEOUS at 02:59

## 2019-01-01 RX ADMIN — MORPHINE SULFATE 2 MG: 2 INJECTION, SOLUTION INTRAMUSCULAR; INTRAVENOUS at 14:08

## 2019-01-01 RX ADMIN — Medication 10 ML: at 08:39

## 2019-01-01 RX ADMIN — ASPIRIN 81 MG: 81 TABLET, COATED ORAL at 08:36

## 2019-01-01 RX ADMIN — PROPOFOL 50 MG: 10 INJECTION, EMULSION INTRAVENOUS at 14:32

## 2019-01-01 RX ADMIN — PROPOFOL 50 MG: 10 INJECTION, EMULSION INTRAVENOUS at 15:45

## 2019-01-01 RX ADMIN — SODIUM CHLORIDE: 9 INJECTION, SOLUTION INTRAVENOUS at 15:05

## 2019-01-01 RX ADMIN — HEPARIN SODIUM 2500 UNITS: 1000 INJECTION INTRAVENOUS; SUBCUTANEOUS at 15:15

## 2019-01-01 RX ADMIN — HYDRALAZINE HYDROCHLORIDE 5 MG: 20 INJECTION INTRAMUSCULAR; INTRAVENOUS at 12:06

## 2019-01-01 RX ADMIN — PIPERACILLIN SODIUM,TAZOBACTAM SODIUM 3.38 G: 3; .375 INJECTION, POWDER, FOR SOLUTION INTRAVENOUS at 18:42

## 2019-01-01 RX ADMIN — MUPIROCIN: 20 OINTMENT TOPICAL at 20:11

## 2019-01-01 RX ADMIN — OXYCODONE HYDROCHLORIDE AND ACETAMINOPHEN 1 TABLET: 7.5; 325 TABLET ORAL at 22:37

## 2019-01-01 RX ADMIN — OXYCODONE HYDROCHLORIDE AND ACETAMINOPHEN 1 TABLET: 7.5; 325 TABLET ORAL at 23:49

## 2019-01-01 RX ADMIN — Medication 10 ML: at 20:36

## 2019-01-01 RX ADMIN — HYDROMORPHONE HYDROCHLORIDE 0.6 MG: 1 INJECTION, SOLUTION INTRAMUSCULAR; INTRAVENOUS; SUBCUTANEOUS at 16:05

## 2019-01-01 RX ADMIN — ACETAMINOPHEN 1000 MG: 10 INJECTION, SOLUTION INTRAVENOUS at 22:46

## 2019-01-01 RX ADMIN — HEPARIN SODIUM 5000 UNITS: 5000 INJECTION INTRAVENOUS; SUBCUTANEOUS at 20:45

## 2019-01-01 RX ADMIN — TRIAMCINOLONE ACETONIDE: 1 OINTMENT TOPICAL at 22:30

## 2019-01-01 RX ADMIN — OXYCODONE HYDROCHLORIDE AND ACETAMINOPHEN 1 TABLET: 7.5; 325 TABLET ORAL at 08:37

## 2019-01-01 RX ADMIN — SEVELAMER CARBONATE 3200 MG: 800 TABLET, FILM COATED ORAL at 15:21

## 2019-01-01 RX ADMIN — PROPOFOL 150 MG: 10 INJECTION, EMULSION INTRAVENOUS at 14:55

## 2019-01-01 RX ADMIN — HYDROMORPHONE HYDROCHLORIDE 0.5 MG: 1 INJECTION, SOLUTION INTRAMUSCULAR; INTRAVENOUS; SUBCUTANEOUS at 06:24

## 2019-01-01 RX ADMIN — SODIUM CHLORIDE: 4.5 INJECTION, SOLUTION INTRAVENOUS at 16:06

## 2019-01-01 RX ADMIN — DOCUSATE SODIUM 100 MG: 100 CAPSULE, LIQUID FILLED ORAL at 09:21

## 2019-01-01 RX ADMIN — ONDANSETRON 4 MG: 2 INJECTION INTRAMUSCULAR; INTRAVENOUS at 15:36

## 2019-01-01 RX ADMIN — HEPARIN SODIUM 5000 UNITS: 5000 INJECTION INTRAVENOUS; SUBCUTANEOUS at 15:00

## 2019-01-01 RX ADMIN — PIPERACILLIN SODIUM,TAZOBACTAM SODIUM 3.38 G: 3; .375 INJECTION, POWDER, FOR SOLUTION INTRAVENOUS at 06:25

## 2019-01-01 RX ADMIN — NYSTATIN: 100000 CREAM TOPICAL at 09:19

## 2019-01-01 RX ADMIN — Medication 10 ML: at 21:16

## 2019-01-01 RX ADMIN — ASPIRIN 81 MG CHEWABLE TABLET 81 MG: 81 TABLET CHEWABLE at 08:38

## 2019-01-01 RX ADMIN — LIDOCAINE HYDROCHLORIDE 100 MG: 20 INJECTION, SOLUTION INTRAVENOUS at 14:28

## 2019-01-01 RX ADMIN — MUPIROCIN: 20 OINTMENT TOPICAL at 20:41

## 2019-01-01 RX ADMIN — CYCLOBENZAPRINE HYDROCHLORIDE 10 MG: 10 TABLET, FILM COATED ORAL at 22:11

## 2019-01-01 RX ADMIN — LIDOCAINE HYDROCHLORIDE 2.5 ML: 40 SOLUTION TOPICAL at 10:27

## 2019-01-01 RX ADMIN — NYSTATIN: 100000 CREAM TOPICAL at 13:13

## 2019-01-01 RX ADMIN — MUPIROCIN: 20 OINTMENT TOPICAL at 20:48

## 2019-01-01 RX ADMIN — HEPARIN SODIUM 5000 UNITS: 5000 INJECTION INTRAVENOUS; SUBCUTANEOUS at 20:24

## 2019-01-01 RX ADMIN — HYDROMORPHONE HYDROCHLORIDE 1 MG: 1 INJECTION, SOLUTION INTRAMUSCULAR; INTRAVENOUS; SUBCUTANEOUS at 20:55

## 2019-01-01 RX ADMIN — PRAVASTATIN SODIUM 20 MG: 20 TABLET ORAL at 20:59

## 2019-01-01 RX ADMIN — LEVOTHYROXINE SODIUM 50 MCG: 50 TABLET ORAL at 06:25

## 2019-01-01 RX ADMIN — INSULIN LISPRO 2 UNITS: 100 INJECTION, SOLUTION INTRAVENOUS; SUBCUTANEOUS at 17:50

## 2019-01-01 RX ADMIN — ACETAMINOPHEN 1000 MG: 500 TABLET ORAL at 06:24

## 2019-01-01 RX ADMIN — ONDANSETRON 4 MG: 2 INJECTION INTRAMUSCULAR; INTRAVENOUS at 22:51

## 2019-01-01 RX ADMIN — HEPARIN SODIUM 5000 UNITS: 5000 INJECTION INTRAVENOUS; SUBCUTANEOUS at 14:08

## 2019-01-01 RX ADMIN — HYDROMORPHONE HYDROCHLORIDE 0.5 MG: 1 INJECTION, SOLUTION INTRAMUSCULAR; INTRAVENOUS; SUBCUTANEOUS at 00:32

## 2019-01-01 RX ADMIN — PIPERACILLIN SODIUM,TAZOBACTAM SODIUM 3.38 G: 3; .375 INJECTION, POWDER, FOR SOLUTION INTRAVENOUS at 06:10

## 2019-01-01 RX ADMIN — PIPERACILLIN SODIUM,TAZOBACTAM SODIUM 2.25 G: 2; .25 INJECTION, POWDER, FOR SOLUTION INTRAVENOUS at 18:42

## 2019-01-01 RX ADMIN — HYOSCYAMINE SULFATE: 16 SOLUTION at 08:35

## 2019-01-01 RX ADMIN — Medication 10 ML: at 08:36

## 2019-01-01 RX ADMIN — FAMOTIDINE 20 MG: 20 TABLET ORAL at 08:33

## 2019-01-01 RX ADMIN — SEVELAMER CARBONATE 3200 MG: 800 TABLET, FILM COATED ORAL at 17:53

## 2019-01-01 RX ADMIN — IOPAMIDOL 135 ML: 755 INJECTION, SOLUTION INTRAVENOUS at 12:36

## 2019-01-01 RX ADMIN — FAMOTIDINE 20 MG: 20 TABLET ORAL at 08:37

## 2019-01-01 RX ADMIN — COLLAGENASE SANTYL: 250 OINTMENT TOPICAL at 17:38

## 2019-01-01 RX ADMIN — Medication 10 ML: at 12:57

## 2019-01-01 RX ADMIN — INSULIN LISPRO 3 UNITS: 100 INJECTION, SOLUTION INTRAVENOUS; SUBCUTANEOUS at 12:19

## 2019-01-01 RX ADMIN — OXYCODONE HYDROCHLORIDE AND ACETAMINOPHEN 1 TABLET: 7.5; 325 TABLET ORAL at 15:24

## 2019-01-01 RX ADMIN — LEVOTHYROXINE SODIUM 50 MCG: 50 TABLET ORAL at 06:10

## 2019-01-01 RX ADMIN — Medication 5 MG: at 19:58

## 2019-01-01 RX ADMIN — EXAMETAZIME 30 MILLICURIE: KIT at 12:11

## 2019-01-01 RX ADMIN — HEPARIN SODIUM 5000 UNITS: 5000 INJECTION INTRAVENOUS; SUBCUTANEOUS at 06:24

## 2019-01-01 RX ADMIN — INSULIN GLARGINE 20 UNITS: 100 INJECTION, SOLUTION SUBCUTANEOUS at 21:32

## 2019-01-01 RX ADMIN — CIPROFLOXACIN HYDROCHLORIDE 500 MG: 500 TABLET, FILM COATED ORAL at 13:02

## 2019-01-01 RX ADMIN — SEVELAMER CARBONATE 3200 MG: 800 TABLET, FILM COATED ORAL at 13:50

## 2019-01-01 RX ADMIN — PIPERACILLIN SODIUM,TAZOBACTAM SODIUM 3.38 G: 3; .375 INJECTION, POWDER, FOR SOLUTION INTRAVENOUS at 06:17

## 2019-01-01 RX ADMIN — HYDROMORPHONE HYDROCHLORIDE 0.5 MG: 1 INJECTION, SOLUTION INTRAMUSCULAR; INTRAVENOUS; SUBCUTANEOUS at 10:41

## 2019-01-01 RX ADMIN — PIPERACILLIN SODIUM,TAZOBACTAM SODIUM 3.38 G: 3; .375 INJECTION, POWDER, FOR SOLUTION INTRAVENOUS at 06:36

## 2019-01-01 RX ADMIN — INSULIN LISPRO 3 UNITS: 100 INJECTION, SOLUTION INTRAVENOUS; SUBCUTANEOUS at 12:05

## 2019-01-01 RX ADMIN — CIPROFLOXACIN HYDROCHLORIDE 500 MG: 500 TABLET, FILM COATED ORAL at 08:35

## 2019-01-01 RX ADMIN — INSULIN GLARGINE 30 UNITS: 100 INJECTION, SOLUTION SUBCUTANEOUS at 23:50

## 2019-01-01 RX ADMIN — INSULIN LISPRO 4 UNITS: 100 INJECTION, SOLUTION INTRAVENOUS; SUBCUTANEOUS at 17:08

## 2019-01-01 RX ADMIN — MIDAZOLAM HYDROCHLORIDE 2 MG: 2 INJECTION, SOLUTION INTRAMUSCULAR; INTRAVENOUS at 14:22

## 2019-01-01 RX ADMIN — Medication 5 MG: at 20:49

## 2019-01-01 RX ADMIN — MUPIROCIN: 20 OINTMENT TOPICAL at 08:39

## 2019-01-01 RX ADMIN — Medication 10 ML: at 09:30

## 2019-01-01 RX ADMIN — SODIUM POLYSTYRENE SULFONATE 30 G: 15 SUSPENSION ORAL; RECTAL at 18:46

## 2019-01-01 RX ADMIN — ASPIRIN 81 MG CHEWABLE TABLET 81 MG: 81 TABLET CHEWABLE at 09:21

## 2019-01-01 RX ADMIN — DOCUSATE SODIUM 100 MG: 100 CAPSULE, LIQUID FILLED ORAL at 20:59

## 2019-01-01 RX ADMIN — INSULIN LISPRO 1 UNITS: 100 INJECTION, SOLUTION INTRAVENOUS; SUBCUTANEOUS at 12:24

## 2019-01-01 RX ADMIN — INSULIN LISPRO 2 UNITS: 100 INJECTION, SOLUTION INTRAVENOUS; SUBCUTANEOUS at 20:45

## 2019-01-01 RX ADMIN — INSULIN LISPRO 1 UNITS: 100 INJECTION, SOLUTION INTRAVENOUS; SUBCUTANEOUS at 18:07

## 2019-01-01 RX ADMIN — INSULIN LISPRO 2 UNITS: 100 INJECTION, SOLUTION INTRAVENOUS; SUBCUTANEOUS at 22:32

## 2019-01-01 RX ADMIN — SEVELAMER CARBONATE 3200 MG: 800 TABLET, FILM COATED ORAL at 08:12

## 2019-01-01 RX ADMIN — CYCLOBENZAPRINE HYDROCHLORIDE 10 MG: 10 TABLET, FILM COATED ORAL at 20:47

## 2019-01-01 RX ADMIN — SEVELAMER CARBONATE 3200 MG: 800 TABLET, FILM COATED ORAL at 21:08

## 2019-01-01 RX ADMIN — HEPARIN SODIUM 5000 UNITS: 5000 INJECTION INTRAVENOUS; SUBCUTANEOUS at 06:39

## 2019-01-01 RX ADMIN — Medication 10 ML: at 21:37

## 2019-01-01 RX ADMIN — BUMETANIDE 2 MG: 2 TABLET ORAL at 20:41

## 2019-01-01 RX ADMIN — CIPROFLOXACIN 500 MG: 500 TABLET, FILM COATED ORAL at 08:37

## 2019-01-01 RX ADMIN — SENNOSIDES AND DOCUSATE SODIUM 1 TABLET: 8.6; 5 TABLET ORAL at 08:36

## 2019-01-01 RX ADMIN — LIDOCAINE HYDROCHLORIDE: 40 SOLUTION TOPICAL at 10:25

## 2019-01-01 RX ADMIN — LIDOCAINE HYDROCHLORIDE 2.5 ML: 40 SOLUTION TOPICAL at 09:03

## 2019-01-01 RX ADMIN — HEPARIN SODIUM 5000 UNITS: 5000 INJECTION INTRAVENOUS; SUBCUTANEOUS at 15:05

## 2019-01-01 RX ADMIN — HEPARIN SODIUM 5000 UNITS: 5000 INJECTION INTRAVENOUS; SUBCUTANEOUS at 21:52

## 2019-01-01 RX ADMIN — OXYCODONE HYDROCHLORIDE AND ACETAMINOPHEN 1 TABLET: 7.5; 325 TABLET ORAL at 19:42

## 2019-01-01 RX ADMIN — MORPHINE SULFATE 2 MG: 2 INJECTION, SOLUTION INTRAMUSCULAR; INTRAVENOUS at 00:43

## 2019-01-01 RX ADMIN — NYSTATIN: 100000 CREAM TOPICAL at 22:30

## 2019-01-01 RX ADMIN — DOCUSATE SODIUM 100 MG: 100 CAPSULE, LIQUID FILLED ORAL at 08:38

## 2019-01-01 RX ADMIN — Medication 10 ML: at 20:50

## 2019-01-01 RX ADMIN — ACETAMINOPHEN 1000 MG: 10 INJECTION, SOLUTION INTRAVENOUS at 05:40

## 2019-01-01 RX ADMIN — INSULIN LISPRO 3 UNITS: 100 INJECTION, SOLUTION INTRAVENOUS; SUBCUTANEOUS at 20:06

## 2019-01-01 RX ADMIN — PANTOPRAZOLE SODIUM 40 MG: 40 TABLET, DELAYED RELEASE ORAL at 06:41

## 2019-01-01 RX ADMIN — HEPARIN SODIUM 5000 UNITS: 5000 INJECTION INTRAVENOUS; SUBCUTANEOUS at 05:33

## 2019-01-01 RX ADMIN — MORPHINE SULFATE 2 MG: 2 INJECTION, SOLUTION INTRAMUSCULAR; INTRAVENOUS at 07:58

## 2019-01-01 RX ADMIN — BISACODYL 10 MG: 10 SUPPOSITORY RECTAL at 03:30

## 2019-01-01 RX ADMIN — HYDROMORPHONE HYDROCHLORIDE 0.5 MG: 1 INJECTION, SOLUTION INTRAMUSCULAR; INTRAVENOUS; SUBCUTANEOUS at 18:35

## 2019-01-01 ASSESSMENT — PAIN DESCRIPTION - ORIENTATION
ORIENTATION: LEFT
ORIENTATION: RIGHT;LOWER
ORIENTATION: LEFT
ORIENTATION: RIGHT;LOWER
ORIENTATION: LEFT

## 2019-01-01 ASSESSMENT — PAIN DESCRIPTION - LOCATION
LOCATION: LEG;FOOT
LOCATION: FOOT;KNEE;LEG
LOCATION: KNEE
LOCATION: KNEE
LOCATION: FOOT;LEG
LOCATION: KNEE
LOCATION: FOOT;KNEE;LEG
LOCATION: KNEE;LEG
LOCATION: KNEE
LOCATION: LEG;KNEE
LOCATION: KNEE
LOCATION: KNEE;LEG
LOCATION: KNEE
LOCATION: LEG
LOCATION: LEG
LOCATION: KNEE
LOCATION: KNEE
LOCATION: ABDOMEN
LOCATION: KNEE
LOCATION: LEG;FOOT
LOCATION: KNEE
LOCATION: FOOT;LEG
LOCATION: LEG
LOCATION: KNEE
LOCATION: FOOT;KNEE
LOCATION: FOOT;LEG
LOCATION: FOOT;LEG
LOCATION: KNEE
LOCATION: KNEE
LOCATION: KNEE;LEG
LOCATION: KNEE
LOCATION: FOOT;LEG
LOCATION: KNEE
LOCATION: ABDOMEN
LOCATION: FOOT;LEG
LOCATION: KNEE
LOCATION: KNEE
LOCATION: FOOT;LEG
LOCATION: KNEE
LOCATION: FOOT
LOCATION: HAND;KNEE
LOCATION: KNEE
LOCATION: KNEE

## 2019-01-01 ASSESSMENT — PAIN SCALES - GENERAL
PAINLEVEL_OUTOF10: 10
PAINLEVEL_OUTOF10: 10
PAINLEVEL_OUTOF10: 8
PAINLEVEL_OUTOF10: 10
PAINLEVEL_OUTOF10: 10
PAINLEVEL_OUTOF10: 0
PAINLEVEL_OUTOF10: 7
PAINLEVEL_OUTOF10: 7
PAINLEVEL_OUTOF10: 0
PAINLEVEL_OUTOF10: 7
PAINLEVEL_OUTOF10: 8
PAINLEVEL_OUTOF10: 10
PAINLEVEL_OUTOF10: 9
PAINLEVEL_OUTOF10: 9
PAINLEVEL_OUTOF10: 7
PAINLEVEL_OUTOF10: 8
PAINLEVEL_OUTOF10: 9
PAINLEVEL_OUTOF10: 5
PAINLEVEL_OUTOF10: 0
PAINLEVEL_OUTOF10: 7
PAINLEVEL_OUTOF10: 6
PAINLEVEL_OUTOF10: 7
PAINLEVEL_OUTOF10: 4
PAINLEVEL_OUTOF10: 7
PAINLEVEL_OUTOF10: 9
PAINLEVEL_OUTOF10: 7
PAINLEVEL_OUTOF10: 0
PAINLEVEL_OUTOF10: 7
PAINLEVEL_OUTOF10: 0
PAINLEVEL_OUTOF10: 0
PAINLEVEL_OUTOF10: 3
PAINLEVEL_OUTOF10: 7
PAINLEVEL_OUTOF10: 0
PAINLEVEL_OUTOF10: 7
PAINLEVEL_OUTOF10: 5
PAINLEVEL_OUTOF10: 8
PAINLEVEL_OUTOF10: 10
PAINLEVEL_OUTOF10: 8
PAINLEVEL_OUTOF10: 10
PAINLEVEL_OUTOF10: 8
PAINLEVEL_OUTOF10: 0
PAINLEVEL_OUTOF10: 7
PAINLEVEL_OUTOF10: 0
PAINLEVEL_OUTOF10: 6
PAINLEVEL_OUTOF10: 8
PAINLEVEL_OUTOF10: 10
PAINLEVEL_OUTOF10: 0
PAINLEVEL_OUTOF10: 7
PAINLEVEL_OUTOF10: 5
PAINLEVEL_OUTOF10: 0
PAINLEVEL_OUTOF10: 10
PAINLEVEL_OUTOF10: 7
PAINLEVEL_OUTOF10: 0
PAINLEVEL_OUTOF10: 0
PAINLEVEL_OUTOF10: 9
PAINLEVEL_OUTOF10: 7
PAINLEVEL_OUTOF10: 7
PAINLEVEL_OUTOF10: 8
PAINLEVEL_OUTOF10: 0
PAINLEVEL_OUTOF10: 0
PAINLEVEL_OUTOF10: 7
PAINLEVEL_OUTOF10: 5
PAINLEVEL_OUTOF10: 4
PAINLEVEL_OUTOF10: 7
PAINLEVEL_OUTOF10: 10
PAINLEVEL_OUTOF10: 6
PAINLEVEL_OUTOF10: 0
PAINLEVEL_OUTOF10: 7
PAINLEVEL_OUTOF10: 8
PAINLEVEL_OUTOF10: 4
PAINLEVEL_OUTOF10: 9
PAINLEVEL_OUTOF10: 8
PAINLEVEL_OUTOF10: 7
PAINLEVEL_OUTOF10: 9
PAINLEVEL_OUTOF10: 7
PAINLEVEL_OUTOF10: 7
PAINLEVEL_OUTOF10: 0
PAINLEVEL_OUTOF10: 7
PAINLEVEL_OUTOF10: 8
PAINLEVEL_OUTOF10: 5
PAINLEVEL_OUTOF10: 8
PAINLEVEL_OUTOF10: 5
PAINLEVEL_OUTOF10: 0
PAINLEVEL_OUTOF10: 10
PAINLEVEL_OUTOF10: 0
PAINLEVEL_OUTOF10: 6
PAINLEVEL_OUTOF10: 0
PAINLEVEL_OUTOF10: 0
PAINLEVEL_OUTOF10: 4
PAINLEVEL_OUTOF10: 0
PAINLEVEL_OUTOF10: 6
PAINLEVEL_OUTOF10: 4
PAINLEVEL_OUTOF10: 7
PAINLEVEL_OUTOF10: 0
PAINLEVEL_OUTOF10: 6
PAINLEVEL_OUTOF10: 7
PAINLEVEL_OUTOF10: 10
PAINLEVEL_OUTOF10: 5
PAINLEVEL_OUTOF10: 7
PAINLEVEL_OUTOF10: 8
PAINLEVEL_OUTOF10: 10
PAINLEVEL_OUTOF10: 0
PAINLEVEL_OUTOF10: 10
PAINLEVEL_OUTOF10: 8
PAINLEVEL_OUTOF10: 10
PAINLEVEL_OUTOF10: 8
PAINLEVEL_OUTOF10: 7
PAINLEVEL_OUTOF10: 7
PAINLEVEL_OUTOF10: 6
PAINLEVEL_OUTOF10: 5
PAINLEVEL_OUTOF10: 8
PAINLEVEL_OUTOF10: 5
PAINLEVEL_OUTOF10: 10
PAINLEVEL_OUTOF10: 3
PAINLEVEL_OUTOF10: 8
PAINLEVEL_OUTOF10: 5
PAINLEVEL_OUTOF10: 10
PAINLEVEL_OUTOF10: 10
PAINLEVEL_OUTOF10: 9
PAINLEVEL_OUTOF10: 6
PAINLEVEL_OUTOF10: 5
PAINLEVEL_OUTOF10: 7
PAINLEVEL_OUTOF10: 7
PAINLEVEL_OUTOF10: 10
PAINLEVEL_OUTOF10: 9
PAINLEVEL_OUTOF10: 8
PAINLEVEL_OUTOF10: 10
PAINLEVEL_OUTOF10: 8
PAINLEVEL_OUTOF10: 7
PAINLEVEL_OUTOF10: 9
PAINLEVEL_OUTOF10: 6
PAINLEVEL_OUTOF10: 10
PAINLEVEL_OUTOF10: 9
PAINLEVEL_OUTOF10: 8
PAINLEVEL_OUTOF10: 10
PAINLEVEL_OUTOF10: 7
PAINLEVEL_OUTOF10: 5
PAINLEVEL_OUTOF10: 3
PAINLEVEL_OUTOF10: 4
PAINLEVEL_OUTOF10: 0
PAINLEVEL_OUTOF10: 9
PAINLEVEL_OUTOF10: 6
PAINLEVEL_OUTOF10: 10
PAINLEVEL_OUTOF10: 0
PAINLEVEL_OUTOF10: 8
PAINLEVEL_OUTOF10: 7

## 2019-01-01 ASSESSMENT — PAIN DESCRIPTION - PROGRESSION
CLINICAL_PROGRESSION: GRADUALLY WORSENING
CLINICAL_PROGRESSION: GRADUALLY IMPROVING
CLINICAL_PROGRESSION: NOT CHANGED
CLINICAL_PROGRESSION: NOT CHANGED
CLINICAL_PROGRESSION: GRADUALLY WORSENING
CLINICAL_PROGRESSION: GRADUALLY IMPROVING
CLINICAL_PROGRESSION: GRADUALLY WORSENING
CLINICAL_PROGRESSION: NOT CHANGED
CLINICAL_PROGRESSION: NOT CHANGED
CLINICAL_PROGRESSION: RAPIDLY WORSENING
CLINICAL_PROGRESSION: NOT CHANGED
CLINICAL_PROGRESSION: GRADUALLY WORSENING
CLINICAL_PROGRESSION: NOT CHANGED
CLINICAL_PROGRESSION: NOT CHANGED
CLINICAL_PROGRESSION: GRADUALLY WORSENING
CLINICAL_PROGRESSION: NOT CHANGED
CLINICAL_PROGRESSION: GRADUALLY WORSENING
CLINICAL_PROGRESSION: NOT CHANGED
CLINICAL_PROGRESSION: GRADUALLY IMPROVING
CLINICAL_PROGRESSION: GRADUALLY WORSENING
CLINICAL_PROGRESSION: GRADUALLY WORSENING
CLINICAL_PROGRESSION: GRADUALLY IMPROVING
CLINICAL_PROGRESSION: NOT CHANGED
CLINICAL_PROGRESSION: GRADUALLY WORSENING
CLINICAL_PROGRESSION: NOT CHANGED
CLINICAL_PROGRESSION: GRADUALLY IMPROVING
CLINICAL_PROGRESSION: GRADUALLY IMPROVING
CLINICAL_PROGRESSION: GRADUALLY WORSENING
CLINICAL_PROGRESSION: NOT CHANGED
CLINICAL_PROGRESSION: GRADUALLY WORSENING
CLINICAL_PROGRESSION: GRADUALLY IMPROVING
CLINICAL_PROGRESSION: NOT CHANGED
CLINICAL_PROGRESSION: GRADUALLY WORSENING
CLINICAL_PROGRESSION: GRADUALLY IMPROVING
CLINICAL_PROGRESSION: NOT CHANGED
CLINICAL_PROGRESSION: GRADUALLY WORSENING
CLINICAL_PROGRESSION: NOT CHANGED
CLINICAL_PROGRESSION: GRADUALLY WORSENING
CLINICAL_PROGRESSION: NOT CHANGED
CLINICAL_PROGRESSION: NOT CHANGED
CLINICAL_PROGRESSION: GRADUALLY IMPROVING
CLINICAL_PROGRESSION: NOT CHANGED
CLINICAL_PROGRESSION: GRADUALLY WORSENING
CLINICAL_PROGRESSION: NOT CHANGED
CLINICAL_PROGRESSION: GRADUALLY IMPROVING
CLINICAL_PROGRESSION: GRADUALLY WORSENING
CLINICAL_PROGRESSION: NOT CHANGED
CLINICAL_PROGRESSION: GRADUALLY WORSENING
CLINICAL_PROGRESSION: NOT CHANGED
CLINICAL_PROGRESSION: GRADUALLY WORSENING
CLINICAL_PROGRESSION: NOT CHANGED
CLINICAL_PROGRESSION: GRADUALLY WORSENING
CLINICAL_PROGRESSION: NOT CHANGED
CLINICAL_PROGRESSION: GRADUALLY WORSENING
CLINICAL_PROGRESSION: NOT CHANGED

## 2019-01-01 ASSESSMENT — ENCOUNTER SYMPTOMS
RHINORRHEA: 0
EYE DISCHARGE: 0
COLOR CHANGE: 0
EYE DISCHARGE: 0
ABDOMINAL PAIN: 0
EYE DISCHARGE: 0
TROUBLE SWALLOWING: 0
RESPIRATORY NEGATIVE: 1
RESPIRATORY NEGATIVE: 1
NAUSEA: 0
BLOOD IN STOOL: 0
STRIDOR: 0
SHORTNESS OF BREATH: 0
SHORTNESS OF BREATH: 0
COUGH: 0
SHORTNESS OF BREATH: 0
RHINORRHEA: 0
CONSTIPATION: 1
ABDOMINAL PAIN: 0
PHOTOPHOBIA: 0
GASTROINTESTINAL NEGATIVE: 1
FACIAL SWELLING: 0
FACIAL SWELLING: 0
EYE DISCHARGE: 0
BLOOD IN STOOL: 0
PHOTOPHOBIA: 0
CHOKING: 0
APNEA: 0
ABDOMINAL PAIN: 0
RHINORRHEA: 0
APNEA: 0
EYE REDNESS: 0
ABDOMINAL PAIN: 1
COUGH: 0
DIARRHEA: 0
CHEST TIGHTNESS: 0
EYE REDNESS: 0
RHINORRHEA: 0
STRIDOR: 0
TROUBLE SWALLOWING: 0
DIARRHEA: 0
APNEA: 0
ROS SKIN COMMENTS: LEFT HEEL ULCER
BLOOD IN STOOL: 0
TROUBLE SWALLOWING: 0
APNEA: 0
CHOKING: 0
ABDOMINAL PAIN: 0
CHEST TIGHTNESS: 0
SHORTNESS OF BREATH: 0
EYE REDNESS: 0
COLOR CHANGE: 0
CHEST TIGHTNESS: 0
EYE REDNESS: 0
CHOKING: 0
COUGH: 0
EYES NEGATIVE: 1
DIARRHEA: 0
COLOR CHANGE: 0
FACIAL SWELLING: 0
PHOTOPHOBIA: 0
CHOKING: 0
NAUSEA: 1
NAUSEA: 0
ALLERGIC/IMMUNOLOGIC NEGATIVE: 1
TROUBLE SWALLOWING: 0
DIARRHEA: 0
STRIDOR: 0
COUGH: 0
COLOR CHANGE: 1
PHOTOPHOBIA: 0
NAUSEA: 0
CHEST TIGHTNESS: 0
BLOOD IN STOOL: 0
FACIAL SWELLING: 0
COLOR CHANGE: 0
STRIDOR: 0
NAUSEA: 0

## 2019-01-01 ASSESSMENT — PULMONARY FUNCTION TESTS
PIF_VALUE: 1
PIF_VALUE: 13
PIF_VALUE: 13
PIF_VALUE: 1
PIF_VALUE: 19
PIF_VALUE: 20
PIF_VALUE: 5
PIF_VALUE: 4
PIF_VALUE: 1
PIF_VALUE: 12
PIF_VALUE: 18
PIF_VALUE: 5
PIF_VALUE: 12
PIF_VALUE: 4
PIF_VALUE: 14
PIF_VALUE: 5
PIF_VALUE: 7
PIF_VALUE: 12
PIF_VALUE: 1
PIF_VALUE: 0
PIF_VALUE: 4
PIF_VALUE: 14
PIF_VALUE: 5
PIF_VALUE: 5
PIF_VALUE: 6
PIF_VALUE: 5
PIF_VALUE: 12
PIF_VALUE: 6
PIF_VALUE: 12
PIF_VALUE: 5
PIF_VALUE: 5
PIF_VALUE: 12
PIF_VALUE: 11
PIF_VALUE: 18
PIF_VALUE: 16
PIF_VALUE: 19
PIF_VALUE: 1
PIF_VALUE: 1
PIF_VALUE: 21
PIF_VALUE: 20
PIF_VALUE: 12
PIF_VALUE: 18
PIF_VALUE: 4
PIF_VALUE: 6
PIF_VALUE: 19
PIF_VALUE: 21
PIF_VALUE: 13
PIF_VALUE: 0
PIF_VALUE: 14
PIF_VALUE: 18
PIF_VALUE: 14
PIF_VALUE: 4
PIF_VALUE: 26
PIF_VALUE: 14
PIF_VALUE: 19
PIF_VALUE: 12
PIF_VALUE: 0
PIF_VALUE: 1
PIF_VALUE: 6
PIF_VALUE: 14
PIF_VALUE: 1
PIF_VALUE: 15
PIF_VALUE: 3
PIF_VALUE: 20
PIF_VALUE: 20
PIF_VALUE: 4
PIF_VALUE: 5
PIF_VALUE: 14
PIF_VALUE: 1
PIF_VALUE: 4
PIF_VALUE: 14
PIF_VALUE: 4
PIF_VALUE: 7
PIF_VALUE: 14
PIF_VALUE: 5
PIF_VALUE: 1
PIF_VALUE: 18
PIF_VALUE: 5
PIF_VALUE: 4
PIF_VALUE: 1
PIF_VALUE: 4
PIF_VALUE: 5
PIF_VALUE: 1
PIF_VALUE: 18
PIF_VALUE: 1
PIF_VALUE: 12
PIF_VALUE: 2
PIF_VALUE: 15
PIF_VALUE: 19
PIF_VALUE: 7
PIF_VALUE: 1
PIF_VALUE: 1
PIF_VALUE: 13
PIF_VALUE: 18
PIF_VALUE: 1
PIF_VALUE: 4
PIF_VALUE: 12
PIF_VALUE: 5
PIF_VALUE: 8
PIF_VALUE: 3
PIF_VALUE: 13
PIF_VALUE: 5
PIF_VALUE: 5
PIF_VALUE: 1
PIF_VALUE: 1
PIF_VALUE: 5
PIF_VALUE: 3
PIF_VALUE: 4
PIF_VALUE: 6
PIF_VALUE: 12
PIF_VALUE: 19
PIF_VALUE: 0
PIF_VALUE: 15
PIF_VALUE: 3
PIF_VALUE: 3
PIF_VALUE: 13
PIF_VALUE: 1
PIF_VALUE: 14
PIF_VALUE: 5
PIF_VALUE: 14
PIF_VALUE: 1
PIF_VALUE: 4
PIF_VALUE: 13
PIF_VALUE: 12
PIF_VALUE: 14
PIF_VALUE: 5
PIF_VALUE: 23
PIF_VALUE: 5
PIF_VALUE: 3
PIF_VALUE: 5
PIF_VALUE: 18
PIF_VALUE: 5
PIF_VALUE: 6
PIF_VALUE: 19
PIF_VALUE: 4
PIF_VALUE: 14
PIF_VALUE: 15
PIF_VALUE: 18
PIF_VALUE: 13
PIF_VALUE: 0
PIF_VALUE: 3
PIF_VALUE: 18
PIF_VALUE: 14
PIF_VALUE: 5
PIF_VALUE: 1
PIF_VALUE: 18
PIF_VALUE: 1
PIF_VALUE: 1
PIF_VALUE: 15
PIF_VALUE: 13
PIF_VALUE: 7
PIF_VALUE: 18
PIF_VALUE: 3
PIF_VALUE: 12
PIF_VALUE: 18
PIF_VALUE: 18
PIF_VALUE: 14
PIF_VALUE: 8
PIF_VALUE: 13

## 2019-01-01 ASSESSMENT — PAIN DESCRIPTION - FREQUENCY
FREQUENCY: CONTINUOUS
FREQUENCY: INTERMITTENT
FREQUENCY: CONTINUOUS
FREQUENCY: INTERMITTENT
FREQUENCY: INTERMITTENT
FREQUENCY: CONTINUOUS
FREQUENCY: INTERMITTENT
FREQUENCY: CONTINUOUS
FREQUENCY: INTERMITTENT
FREQUENCY: CONTINUOUS
FREQUENCY: INTERMITTENT
FREQUENCY: INTERMITTENT

## 2019-01-01 ASSESSMENT — PAIN DESCRIPTION - PAIN TYPE
TYPE: SURGICAL PAIN
TYPE: ACUTE PAIN
TYPE: SURGICAL PAIN
TYPE: SURGICAL PAIN
TYPE: ACUTE PAIN
TYPE: SURGICAL PAIN
TYPE: ACUTE PAIN
TYPE: SURGICAL PAIN
TYPE: CHRONIC PAIN
TYPE: ACUTE PAIN
TYPE: SURGICAL PAIN
TYPE: CHRONIC PAIN
TYPE: CHRONIC PAIN
TYPE: SURGICAL PAIN
TYPE: SURGICAL PAIN
TYPE: ACUTE PAIN
TYPE: SURGICAL PAIN
TYPE: ACUTE PAIN
TYPE: SURGICAL PAIN
TYPE: ACUTE PAIN
TYPE: SURGICAL PAIN
TYPE: ACUTE PAIN;SURGICAL PAIN
TYPE: SURGICAL PAIN
TYPE: ACUTE PAIN
TYPE: SURGICAL PAIN
TYPE: ACUTE PAIN;SURGICAL PAIN
TYPE: CHRONIC PAIN
TYPE: SURGICAL PAIN
TYPE: ACUTE PAIN
TYPE: CHRONIC PAIN
TYPE: ACUTE PAIN
TYPE: SURGICAL PAIN
TYPE: ACUTE PAIN;SURGICAL PAIN
TYPE: SURGICAL PAIN
TYPE: ACUTE PAIN
TYPE: ACUTE PAIN
TYPE: SURGICAL PAIN
TYPE: ACUTE PAIN
TYPE: CHRONIC PAIN
TYPE: SURGICAL PAIN
TYPE: ACUTE PAIN
TYPE: CHRONIC PAIN
TYPE: SURGICAL PAIN
TYPE: ACUTE PAIN;SURGICAL PAIN
TYPE: SURGICAL PAIN
TYPE: ACUTE PAIN
TYPE: ACUTE PAIN
TYPE: SURGICAL PAIN
TYPE: CHRONIC PAIN
TYPE: SURGICAL PAIN
TYPE: ACUTE PAIN
TYPE: SURGICAL PAIN
TYPE: ACUTE PAIN
TYPE: CHRONIC PAIN;ACUTE PAIN
TYPE: ACUTE PAIN

## 2019-01-01 ASSESSMENT — PAIN DESCRIPTION - DESCRIPTORS
DESCRIPTORS: ACHING;BURNING
DESCRIPTORS: SHOOTING;SHARP
DESCRIPTORS: ACHING
DESCRIPTORS: ACHING
DESCRIPTORS: ACHING;THROBBING;SHOOTING
DESCRIPTORS: SHOOTING;SHARP
DESCRIPTORS: SHOOTING;ACHING
DESCRIPTORS: SHOOTING
DESCRIPTORS: ACHING
DESCRIPTORS: DISCOMFORT
DESCRIPTORS: SHOOTING;SORE;TENDER
DESCRIPTORS: SHOOTING;SHARP
DESCRIPTORS: ACHING
DESCRIPTORS: DISCOMFORT
DESCRIPTORS: SHARP;ACHING
DESCRIPTORS: SHARP;ACHING
DESCRIPTORS: ACHING
DESCRIPTORS: SHOOTING
DESCRIPTORS: ACHING
DESCRIPTORS: SHOOTING;SHARP
DESCRIPTORS: ACHING
DESCRIPTORS: DISCOMFORT
DESCRIPTORS: ACHING
DESCRIPTORS: SHOOTING;THROBBING
DESCRIPTORS: CRAMPING
DESCRIPTORS: ACHING
DESCRIPTORS: SHOOTING
DESCRIPTORS: ACHING
DESCRIPTORS: ACHING
DESCRIPTORS: ACHING;SHARP
DESCRIPTORS: CONSTANT
DESCRIPTORS: SHOOTING;SHARP
DESCRIPTORS: SHARP
DESCRIPTORS: ACHING
DESCRIPTORS: DISCOMFORT
DESCRIPTORS: ACHING
DESCRIPTORS: SHOOTING;SHARP
DESCRIPTORS: ACHING
DESCRIPTORS: ACHING;THROBBING;SHOOTING
DESCRIPTORS: SHARP
DESCRIPTORS: ACHING
DESCRIPTORS: ACHING;CONSTANT
DESCRIPTORS: ACHING
DESCRIPTORS: ACHING
DESCRIPTORS: SHOOTING;THROBBING;TENDER
DESCRIPTORS: SHOOTING
DESCRIPTORS: ACHING
DESCRIPTORS: SHOOTING;SHARP
DESCRIPTORS: ACHING
DESCRIPTORS: DISCOMFORT
DESCRIPTORS: SHOOTING
DESCRIPTORS: ACHING
DESCRIPTORS: ACHING;SORE
DESCRIPTORS: CRAMPING
DESCRIPTORS: SHARP
DESCRIPTORS: SHOOTING;SHARP

## 2019-01-01 ASSESSMENT — PAIN DESCRIPTION - ONSET
ONSET: ON-GOING
ONSET: GRADUAL
ONSET: ON-GOING
ONSET: ON-GOING
ONSET: AWAKENED FROM SLEEP
ONSET: ON-GOING
ONSET: ON-GOING
ONSET: PROGRESSIVE
ONSET: ON-GOING
ONSET: PROGRESSIVE
ONSET: ON-GOING
ONSET: ON-GOING
ONSET: GRADUAL
ONSET: ON-GOING
ONSET: AWAKENED FROM SLEEP
ONSET: ON-GOING
ONSET: GRADUAL
ONSET: ON-GOING
ONSET: GRADUAL
ONSET: ON-GOING

## 2019-01-01 ASSESSMENT — PAIN DESCRIPTION - DIRECTION: RADIATING_TOWARDS: DOWNWARD

## 2019-01-01 ASSESSMENT — PAIN - FUNCTIONAL ASSESSMENT
PAIN_FUNCTIONAL_ASSESSMENT: ACTIVITIES ARE NOT PREVENTED
PAIN_FUNCTIONAL_ASSESSMENT: PREVENTS OR INTERFERES WITH ALL ACTIVE AND SOME PASSIVE ACTIVITIES
PAIN_FUNCTIONAL_ASSESSMENT: PREVENTS OR INTERFERES SOME ACTIVE ACTIVITIES AND ADLS
PAIN_FUNCTIONAL_ASSESSMENT: ACTIVITIES ARE NOT PREVENTED
PAIN_FUNCTIONAL_ASSESSMENT: PREVENTS OR INTERFERES SOME ACTIVE ACTIVITIES AND ADLS
PAIN_FUNCTIONAL_ASSESSMENT: PREVENTS OR INTERFERES SOME ACTIVE ACTIVITIES AND ADLS
PAIN_FUNCTIONAL_ASSESSMENT: PREVENTS OR INTERFERES WITH ALL ACTIVE AND SOME PASSIVE ACTIVITIES
PAIN_FUNCTIONAL_ASSESSMENT: PREVENTS OR INTERFERES SOME ACTIVE ACTIVITIES AND ADLS
PAIN_FUNCTIONAL_ASSESSMENT: PREVENTS OR INTERFERES WITH ALL ACTIVE AND SOME PASSIVE ACTIVITIES
PAIN_FUNCTIONAL_ASSESSMENT: PREVENTS OR INTERFERES SOME ACTIVE ACTIVITIES AND ADLS
PAIN_FUNCTIONAL_ASSESSMENT: ACTIVITIES ARE NOT PREVENTED
PAIN_FUNCTIONAL_ASSESSMENT: ACTIVITIES ARE NOT PREVENTED
PAIN_FUNCTIONAL_ASSESSMENT: PREVENTS OR INTERFERES SOME ACTIVE ACTIVITIES AND ADLS
PAIN_FUNCTIONAL_ASSESSMENT: PREVENTS OR INTERFERES WITH ALL ACTIVE AND SOME PASSIVE ACTIVITIES
PAIN_FUNCTIONAL_ASSESSMENT: PREVENTS OR INTERFERES SOME ACTIVE ACTIVITIES AND ADLS
PAIN_FUNCTIONAL_ASSESSMENT: PREVENTS OR INTERFERES WITH ALL ACTIVE AND SOME PASSIVE ACTIVITIES
PAIN_FUNCTIONAL_ASSESSMENT: PREVENTS OR INTERFERES SOME ACTIVE ACTIVITIES AND ADLS

## 2019-01-01 ASSESSMENT — PAIN SCALES - WONG BAKER
WONGBAKER_NUMERICALRESPONSE: 10
WONGBAKER_NUMERICALRESPONSE: 10

## 2019-05-24 PROBLEM — I73.9 PAD (PERIPHERAL ARTERY DISEASE) (HCC): Status: ACTIVE | Noted: 2019-01-01

## 2019-05-24 PROBLEM — E11.51 TYPE 2 DIABETES MELLITUS WITH ATHEROSCLEROSIS OF NATIVE ARTERIES OF EXTREMITY WITH INTERMITTENT CLAUDICATION (HCC): Status: ACTIVE | Noted: 2019-01-01

## 2019-05-24 PROBLEM — I70.229 CRITICAL LOWER LIMB ISCHEMIA (HCC): Status: ACTIVE | Noted: 2019-01-01

## 2019-05-24 PROBLEM — I70.244 ATHEROSCLEROSIS OF NATIVE ARTERY OF LEFT LOWER EXTREMITY WITH ULCERATION OF HEEL (HCC): Status: ACTIVE | Noted: 2019-01-01

## 2019-05-24 PROBLEM — M79.604 PAIN IN BOTH LOWER EXTREMITIES: Status: ACTIVE | Noted: 2019-01-01

## 2019-05-24 PROBLEM — I70.219 TYPE 2 DIABETES MELLITUS WITH ATHEROSCLEROSIS OF NATIVE ARTERIES OF EXTREMITY WITH INTERMITTENT CLAUDICATION (HCC): Status: ACTIVE | Noted: 2019-01-01

## 2019-05-24 PROBLEM — M79.605 PAIN IN BOTH LOWER EXTREMITIES: Status: ACTIVE | Noted: 2019-01-01

## 2019-05-24 NOTE — PROGRESS NOTES
Activity    Alcohol use: No    Drug use: No    Sexual activity: Yes     Partners: Male   Lifestyle    Physical activity:     Days per week: Not on file     Minutes per session: Not on file    Stress: Not on file   Relationships    Social connections:     Talks on phone: Not on file     Gets together: Not on file     Attends Spiritism service: Not on file     Active member of club or organization: Not on file     Attends meetings of clubs or organizations: Not on file     Relationship status: Not on file    Intimate partner violence:     Fear of current or ex partner: Not on file     Emotionally abused: Not on file     Physically abused: Not on file     Forced sexual activity: Not on file   Other Topics Concern    Not on file   Social History Narrative    Not on file       Review of Systems   Constitutional: Negative. Negative for activity change, appetite change and fatigue. HENT: Negative. Eyes: Positive for visual disturbance (wears glasses ). Respiratory: Negative. Cardiovascular: Negative. Gastrointestinal: Negative. Endocrine: Negative. Genitourinary: Negative. Musculoskeletal: Positive for gait problem. Skin: Positive for wound (LLE heel ). Allergic/Immunologic: Negative. Neurological: Positive for tingling and numbness. Hematological: Negative. Psychiatric/Behavioral: Negative. Vitals:    05/24/19 1321   BP: 120/60   Pulse: 93         Objective:   Physical Exam   Constitutional: She is oriented to person, place, and time. She appears well-developed and well-nourished. No distress. HENT:   Head: Normocephalic and atraumatic. Nose: Nose normal.   Eyes: Conjunctivae and EOM are normal. Right eye exhibits no discharge. Left eye exhibits no discharge. No scleral icterus. Neck: Normal range of motion. No thyromegaly present. Cardiovascular: Normal rate, regular rhythm and normal heart sounds.    Pulmonary/Chest: Effort normal and breath sounds normal. lower extremity w/possible revascularization at [facility].    Patient is scheduled for the angiogram on 06/06/2019. Patient was given the letter, procedure date and arrival time in office today. I Ruben Wilcox MA am scribing for and in the presence of Cristiane Cardenas MD on this date of 05/24/19 at 1:55 PM    I Cristiane Cardenas MD personally performed the services described in this documentation as scribed by the Certified Medical Assistant Ruben Wilcox in my presence and it is both accurate and complete.     Aleksandra Tapia DPM   Podiatric Resident, PGY-2  Pager: [phone]  5/24/2019, 1:55 PM

## 2019-05-24 NOTE — PATIENT INSTRUCTIONS
Patient is to continue her daily 20MG pravastatin for her daily statin therapy. Patient is to start an 81MG Aspirin for her daily antiplatelet therapy. In order to promote wound healing and provide long-term limb salvage, recommend proceeding with diagnostic angiography with possible endovascular revascularization. The Patient will be scheduled for an Out-patient angiogram of the aorta and bilateral lower extremity w/possible revascularization at [facility].    Patient is scheduled for the angiogram on 06/06/2019. Patient was given the letter, procedure date and arrival time in office today.

## 2019-06-06 PROBLEM — I70.243 ATHEROSCLEROSIS OF NATIVE ARTERY OF LEFT LOWER EXTREMITY WITH ULCERATION OF ANKLE (HCC): Status: ACTIVE | Noted: 2019-01-01

## 2019-06-06 NOTE — PROCEDURES
[address] [place] [address] 16                                 PROCEDURE NOTE    PATIENT NAME: Haylie Sethi                        :        1960  MED REC NO:   [id number]                          ROOM:  ACCOUNT NO:   [de-identified]                           ADMIT DATE: 2019  PROVIDER:     Valeria Deng MD    ANGIOGRAPHY    DATE OF PROCEDURE:  2019    PREPROCEDURE DIAGNOSES:  1. Lower extremity atherosclerosis with ulceration. 2.  Peripheral arterial disease. POSTPROCEDURE DIAGNOSES:  1. Lower extremity atherosclerosis with ulceration. 2.  Peripheral arterial disease. PROCEDURES PERFORMED:  1. Ultrasound-guided access to the right common femoral artery. 2.  Abdominal aortogram.  3.  Angiogram of the bilateral lower extremities. 4.  Selective angiogram of the left superficial femoral and popliteal  arteries. 5.  Third order catheter placement into the left superficial femoral  artery. SURGEON:  Valeria Deng MD    ANESTHESIA:  Local with IV sedation. INDICATIONS:  The patient is a 66-year-old female with peripheral  arterial disease documented on duplex imaging and nonhealing ulceration  of the left foot. Angiography is indicated to determine if she is a  candidate for revascularization. Endovascular revascularization would  have been indicated to improve healing. She was aware of the risks,  benefits, and alternatives of the procedure and willing to proceed. PROCEDURE:  Using ultrasound guidance, percutaneous access was gained to  the right common femoral artery with a 5-Danish sheath. A permanent  image of the access was obtained and recorded. Through the 5-Danish  sheath, a pigtail catheter was advanced into the aorta to the level of  the L1 vertebral body. At this location, a full and complete abdominal  aortogram was obtained.   The pigtail catheter was repositioned, bringing  the tip of the catheter down to the level of the L3 vertebral body. At  this location, a full and complete angiogram of the bilateral lower  extremities was obtained. The pigtail catheter was then placed up and over the aortic bifurcation  and into the left superficial femoral artery, and selective angiogram of  the left superficial femoral, popliteal, and tibial arteries was  obtained with the catheter in the mid superficial femoral artery. No  significant arterial stenoses were identified. The catheters were  removed. The right groin puncture was closed with a StarClose technique, and good  hemostasis was obtained. FINDINGS:  Aorta: The celiac and superior mesenteric arteries are patent. The  bilateral renal arteries are patent without significant stenosis. The  infrarenal abdominal aorta is normal appearing. Right Lower Extremity:  The right common, internal, and external iliac  arteries are normal.  The common, deep, superficial femoral and  popliteal arteries are normal.  There is normal three-vessel runoff to  the foot. Left Lower Extremity:  The left common, internal, and external iliac  arteries are normal.  The common, deep, superficial femoral and  popliteal arteries are normal.  There is normal three-vessel runoff to  the foot. IMPRESSION:  Normal left lower extremity and right lower extremity  angiograms. Note, the patient's prognosis for healing based on angiographic evidence  today is good. The patient is clear from a Vascular standpoint to  proceed with Podiatry intervention on her feet as indicated.         Andre Alaniz MD    D: 06/06/2019 12:48:13       T: 06/06/2019 14:05:14     [id number]  Job#: [id number]     Doc#: [id number]    CC:  Vicente Soto DPM

## 2019-06-06 NOTE — PRE SEDATION
Sedation Pre-Procedure Note    Patient Name: Nohemy Ledezma   YOB: 1960  Room/Bed: Room/bed info not found  Medical Record Number: [id number]  Date: 6/6/2019   Time: 12:13 PM       Indication:  PAD    Consent: I have discussed with the patient and/or the patient representative the indication, alternatives, and the possible risks and/or complications of the planned procedure and the anesthesia methods. The patient and/or patient representative appear to understand and agree to proceed. Vital Signs: There were no vitals filed for this visit. Past Medical History:   has a past medical history of Arthritis, Diabetes mellitus ([facility] ESRD (end stage renal disease) ([facility] [address] Hemodialysis patient ([facility] Hypertension, and Thyroid disease. Past Surgical History:   has a past surgical history that includes Foot surgery (Left, 08/15/2017) and other surgical history (Left, 08/17/2017). Medications:   Scheduled Meds:    sodium chloride flush  10 mL Intravenous 2 times per day     Continuous Infusions:    sodium chloride       PRN Meds: ALPRAZolam, sodium chloride flush  Home Meds:   Prior to Admission medications    Medication Sig Start Date End Date Taking?  Authorizing Provider   insulin glargine (LANTUS) 100 UNIT/ML injection vial Inject 30 Units into the skin 2 times daily 8/18/17  Yes Doug Reid DPM   docusate (COLACE, DULCOLAX) 100 MG CAPS Take 100 mg by mouth daily 8/18/17  Yes Magdalene Aguayo DPM   promethazine (PHENERGAN) 25 MG tablet Take 1 tablet by mouth every 6 hours as needed for Nausea 8/18/17  Yes Doug Reid DPM   norethindrone (AYGESTIN) 5 MG tablet Take 5 mg by mouth daily   Yes Historical Provider, MD   pravastatin (PRAVACHOL) 20 MG tablet Take 20 mg by mouth daily   Yes Historical Provider, MD   sevelamer (RENVELA) 800 MG tablet Take 4 tablets by mouth 3 times daily (with meals)   Yes Historical Provider, MD   amLODIPine (NORVASC) 10 MG tablet Take 10 mg by mouth daily   Yes Historical Provider, MD   levothyroxine (SYNTHROID) 100 MCG tablet Take 500 mcg by mouth Daily   Yes Historical Provider, MD   insulin lispro (HUMALOG) 100 UNIT/ML injection vial Inject 5 Units into the skin 3 times daily (with meals) 8/18/17   Rosaline Ahuja DPM           Pre-Sedation Documentation and Exam:   I have personally completed a history, physical exam & review of systems for this patient (see notes).     Mallampati Airway Assessment:  normal    Prior History of Anesthesia Complications:   none    ASA Classification:  Class 2 - A normal healthy patient with mild systemic disease    Sedation/ Anesthesia Plan:   intravenous sedation    Medications Planned:   midazolam (Versed) intravenously and fentanyl intravenously    Patient is an appropriate candidate for plan of sedation: yes    Electronically signed by Cj Nance MD on 6/6/2019 at 12:13 PM

## 2019-06-17 NOTE — PROGRESS NOTES
nystatin (MYCOSTATIN) 013141 UNIT/GM ointment Apply topically 3 times daily Apply topically 2 times daily.  oxyCODONE-acetaminophen (PERCOCET) 5-325 MG per tablet Take 1 tablet by mouth every 6 hours as needed for Pain.  spironolactone (ALDACTONE) 25 MG tablet Take 25 mg by mouth daily      triamcinolone (KENALOG) 0.1 % ointment Apply topically 2 times daily Apply topically 2 times daily.  insulin glargine (LANTUS) 100 UNIT/ML injection vial Inject 30 Units into the skin 2 times daily 1 vial 3    insulin lispro (HUMALOG) 100 UNIT/ML injection vial Inject 5 Units into the skin 3 times daily (with meals) 1 vial 3    docusate (COLACE, DULCOLAX) 100 MG CAPS Take 100 mg by mouth daily (Patient taking differently: Take 100 mg by mouth as needed ) 30 capsule 2    norethindrone (AYGESTIN) 5 MG tablet Take 5 mg by mouth 2 times daily       pravastatin (PRAVACHOL) 20 MG tablet Take 20 mg by mouth nightly       sevelamer (RENVELA) 800 MG tablet Take 4 tablets by mouth 3 times daily (with meals)      amLODIPine (NORVASC) 10 MG tablet Take 10 mg by mouth daily      levothyroxine (SYNTHROID) 100 MCG tablet Take 500 mcg by mouth Daily       No current facility-administered medications on file prior to encounter. REVIEW OF SYSTEMS   Pertinent items are noted in HPI.   Last E6J if applicable:   Hemoglobin A1C   Date Value Ref Range Status   08/14/2017 7.5 See comment % Final     Comment:     Comment:  Diagnosis of Diabetes: > or = 6.5%  Increased risk of diabetes (Prediabetes): 5.7-6.4%  Glycemic Control: Nonpregnant Adults: <7.0%                    Pregnant: <6.0%           Objective:      /68   Pulse 92   Temp 97.9 °F (36.6 °C) (Oral)   Resp 18   Ht 5' 7\" (1.702 m)   Wt (!) 301 lb 5.9 oz (136.7 kg)   BMI 47.20 kg/m²     Wt Readings from Last 3 Encounters:   06/17/19 (!) 301 lb 5.9 oz (136.7 kg)   06/06/19 300 lb (136.1 kg)   05/24/19 (!) 306 lb (138.8 kg)       PHYSICAL EXAM   General 3.6 cm 6/17/2019  2:20 PM   Post-Procedure Depth (cm) 0.3 cm 6/17/2019  2:20 PM   Post-Procedure Surface Area (cm^2) 10.36 cm^2 6/17/2019  2:20 PM   Post-Procedure Volume (cm^3) 5.18 cm^3 6/17/2019  2:20 PM   Wound Assessment Pink;Yellow;Sl[place]; Other (Comment) 6/17/2019  1:58 PM   Drainage Amount Small 6/17/2019  1:58 PM   Drainage Description Yellow 6/17/2019  1:58 PM   Odor None 6/17/2019  1:58 PM   Margins Defined edges 6/17/2019  1:58 PM   Annamarie-wound Assessment Maceration; White 6/17/2019  1:58 PM   Non-staged Wound Description Full thickness 6/17/2019  1:58 PM   [place]%Wound Bed 60 6/17/2019  1:58 PM   Yellow%Wound Bed 30 6/17/2019  1:58 PM   Other%Wound Bed 10 green 6/17/2019  1:58 PM   Number of days: 0          Plan:   Needs aggressive pressure-offloading, which she has not tried yet. Will refer to Dr. Laron Motta for opinion. Vascular status is maximized. No evidence of arterial inflow disease. Treatment Note please see attached Discharge Instructions    New Medication(s) at this visit:   New Prescriptions    No medications on file       Other orders at this visit:   Orders Placed This Encounter   Procedures    Wound care       Smoking Cessation: Counseling given: Not Answered      Written patient dismissal instructions given to patient and signed by patient or POA. Discharge [facility] Physician Orders and Discharge Instructions  [facility] [place] [address]  Telephone: [phone] hands with soap and water prior to and after every dressing change. Wound Cleansing:   · Do not scrub or use excessive force. · With each dressing change, rinse wounds with 0.9% Saline. (May use wound wash or soft contact solution. Both can be purchased at a local drug store). · If unable to obtain saline, may use a gentle soap and water.   · Keep wounds dry in the shower unless otherwise instructed by the physician. Topical Treatments:  Do not apply lotions, creams, or ointments to wound bed unless directed as followed:     [] Apply moisturizing lotion to skin surrounding the wound prior to dressing change  [] Apply antifungal ointment to skin surrounding the wound prior to dressing change  [] Apply thin film of moisture barrier ointment to skin immediately around wound. [] Other    Dressings:           Wound Location: left heel wound      [x] Apply Primary Dressing to wound:       [] Foam/Foam with Border(i.e Mepilex) [] Non-adherent (i.e.Mepitel)   [x] Alginate with Silver (i.e. Silvercel)  [] Alginate (i.e. Aquacel)   [] Collagen (i.e. Puracol)   [] Collagen with Silver(i.e. Dalila)     [] Hydrocolloid    [] Hydrafera Blue moistened with saline   [] MediHoney Paste/Gel   [] Hydrogel    [] Endoform      [] Santyl covered with gauze moisten with saline      [] Betadine   [] Bactroban/Mupirocin   [] Polysporin/Neosporin  [] Other:    [] Saline \"wet to dry\" gauze     Pack wound loosely with: [] Iodoform   [] Plain Packing  [] Saline \"wet to dry\"      [x] Cover and Secure with:     [x] Dry Gauze  [] ABD  [] Cast padding  [] Kerlix or Lo rolled gauze   [] Coban  [] Ace Wrap [] Ace Wrap from forefoot to just below the knee  [] Paper Tape [] Medipore Tape [] Other:    Avoid contact of tape with skin if possible. [x] Change dressing: [] Daily    [] Every Other Day  [x] Three times per week:  [x] Monday, Wednesday, Friday  [] Tuesday, Thursday, Saturday   [] Once a week  [] Do Not Change Dressing [] Other:       Edema Control:       Apply every morning immediately when getting up. They should be applied to affected leg(s) from mid foot to knee making sure to cover the heel. Remove every night before going to bed. [x] Elevate leg(s) above the level of the heart for 30 minutes 4-5 times a day and/or when sitting. [x] Avoid prolonged standing in one place.      Off-Loading:   [x] Off-loading (keeping weight off as much as you can) when: [x] walking  [] in bed [] sitting    [] Total non-weight bearing:  [] Right Leg  [] Left Leg     [] Partial weight bearing:   [] Right Leg  [] Left Leg     [] Heel weight bearing   [] Toe-touch weight bearing  [] Transfers only    [] No Weight bearing restrictions    [] Assistive Device: [] Walker [] Crutches   [] Wheelchair   [] Roll About   [] Surgical shoe/Darco [] Podus Boot(s)    [] Heel Protector Soft Boot(s) - DO NOT WALK WHILE WEARING    [] Cast/CAM Boot  [] CROW Boot [] Other:      Dietary:  Important dietary reminders:  1. Increase Protein intake (i.e. Lean meats, fish, eggs, legumes, and yogurt)  2. No added salt  3. If diabetic, follow a diabetic diet and check glucose prior to meals or as instructed by your physician. Return Appointment:   DME/Wound Dressing Supplies Provided by:   (Please call them directly to reorder supplies when you run out)   ECF or Home Healthcare:   Wound reassessment visit with Nurse :    Edgar Gilmore Return Appointment: With  Dr. Gary Bush or Dr. Desmond Marques  in  [address] [place] [address]). [x] Orders placed during your visit: No orders of the defined types were placed in this encounter. Your nurse  isIshmael Hsu     Electronically signed by Ml Peña RN on 6/17/2019 at 2:21 PM     [address] [place] [address] Information: Should you experience any significant changes in your wound(s) or have questions about your wound care, please contact the [address] [place] [address] at [phone] Monday-Friday from 8:00 am - 4:30 pm except for Wednesdays which hours are from 8:00 am - 2:00 pm.   If you need help with your wound outside these hours and cannot wait until we are again available, contact your PCP or go to the hospital emergency room. PLEASE NOTE: IF YOU ARE UNABLE TO OBTAIN WOUND SUPPLIES, CONTINUE TO USE THE SUPPLIES YOU HAVE AVAILABLE UNTIL YOU ARE ABLE TO REACH US. IT IS MOST IMPORTANT TO KEEP THE WOUND COVERED AT ALL TIMES.       [] Patient unable to sign Discharge Instructions given to ECF/Transportation/POA            Electronically signed by Karolina Clay MD on 6/17/2019 at 2:46 PM

## 2019-06-27 PROBLEM — L97.522 ULCER OF LEFT FOOT, WITH FAT LAYER EXPOSED (HCC): Status: ACTIVE | Noted: 2019-01-01

## 2019-06-27 NOTE — PROGRESS NOTES
[facility] [facility]  Progress Note and Procedure Note      Ciro Holm  MEDICAL RECORD NUMBER:  [id number]  AGE: 61 y.o. GENDER: female  : 1960  EPISODE DATE:  2019    Subjective:     Chief Complaint   Patient presents with    Wound Check     Left foot         HISTORY of PRESENT ILLNESS HPI     Ciro Holm is a 61 y.o. female who presents today for wound/ulcer evaluation. History of Wound Context: Patient relates she has a hematoma on her left leg from trauma and a wound on the plantar left heel. She relates she is scheduled to see a general surgeon about the hematoma. Wound/Ulcer Pain Timing/Severity: intermittent, moderate  Quality of pain: sharp, aching  Severity:  1 / 10   Modifying Factors: Pain is relieved/improved with placing leg(s) in a dependent position  Associated Signs/Symptoms: edema    Ulcer Identification:  Ulcer Type: diabetic  Contributing Factors: edema, diabetes and chronic pressure    Wound: N/A        PAST MEDICAL HISTORY        Diagnosis Date    Arthritis     Diabetes mellitus ([facility] [facility]     ESRD (end stage renal disease) ([address] [facility]     Hemodialysis patient ([address]     Hyperlipidemia     Hypertension     Thyroid disease        PAST SURGICAL HISTORY    Past Surgical History:   Procedure Laterality Date    FOOT SURGERY Left 08/15/2017    LEFT SECOND TOE INCISION AND DEBRIDEMENT OF TISSUE AND BONE WITH OPEN ARTHROPLASTY    OTHER SURGICAL HISTORY Left 2017    INCISION AND DRAINAGE, 2ND DIGIT AMPUTATION LEFT FOOT       FAMILY HISTORY    History reviewed. No pertinent family history.     SOCIAL HISTORY    Social History     Tobacco Use    Smoking status: Former Smoker     Packs/day: 0.25     Years: 20.00     Pack years: 5.00     Types: Cigarettes     Last attempt to quit: 2016     Years since quittin.9    Smokeless tobacco: Never Used   Substance Use Topics    Alcohol use: No    Drug use: No       ALLERGIES    No Known Allergies    MEDICATIONS    Current Outpatient Medications on File Prior to Encounter   Medication Sig Dispense Refill    nystatin (MYCOSTATIN) 800976 UNIT/GM ointment Apply topically 3 times daily Apply topically 2 times daily.  spironolactone (ALDACTONE) 25 MG tablet Take 25 mg by mouth daily      triamcinolone (KENALOG) 0.1 % ointment Apply topically 2 times daily Apply topically 2 times daily.  insulin glargine (LANTUS) 100 UNIT/ML injection vial Inject 30 Units into the skin 2 times daily 1 vial 3    insulin lispro (HUMALOG) 100 UNIT/ML injection vial Inject 5 Units into the skin 3 times daily (with meals) 1 vial 3    docusate (COLACE, DULCOLAX) 100 MG CAPS Take 100 mg by mouth daily (Patient taking differently: Take 100 mg by mouth as needed ) 30 capsule 2    norethindrone (AYGESTIN) 5 MG tablet Take 5 mg by mouth 2 times daily       pravastatin (PRAVACHOL) 20 MG tablet Take 20 mg by mouth nightly       sevelamer (RENVELA) 800 MG tablet Take 4 tablets by mouth 3 times daily (with meals)      amLODIPine (NORVASC) 10 MG tablet Take 10 mg by mouth daily      levothyroxine (SYNTHROID) 100 MCG tablet Take 500 mcg by mouth Daily      oxyCODONE-acetaminophen (PERCOCET) 5-325 MG per tablet Take 1 tablet by mouth every 6 hours as needed for Pain. No current facility-administered medications on file prior to encounter. REVIEW OF SYSTEMS    Pertinent items are noted in HPI.       Last N7G if applicable:   Hemoglobin A1C   Date Value Ref Range Status   08/14/2017 7.5 See comment % Final     Comment:     Comment:  Diagnosis of Diabetes: > or = 6.5%  Increased risk of diabetes (Prediabetes): 5.7-6.4%  Glycemic Control: Nonpregnant Adults: <7.0%                    Pregnant: <6.0%           Objective:      BP (!) 169/64   Pulse 87   Temp 97.8 °F (36.6 °C) (Oral)   Resp 18     Wt Readings from Last 3 Encounters:   06/17/19 (!) 301 lb 5.9 oz (136.7 kg)   06/06/19 300 lb (136.1 kg)   05/24/19 (!) 306 lb (138.8 kg)       PHYSICAL EXAM    General Appearance: alert and oriented to person, place and time, well-developed and well-nourished, in no acute distress  Ulcer plantar left heel exhibits granular base with fibrotic covering. Wound does not probe to bone. There is no surrounding erythema, edema, or warmth. Assessment:      Patient Active Problem List   Diagnosis Code    Diabetic foot infection ([facility] E11.628, L08.9    Chronic kidney disease with end stage renal failure on dialysis ([facility] N18.6, Z99.2    Leukocytosis D72.829    Osteomyelitis of toe of left foot ([facility]) M86.9    Cellulitis of left lower extremity L03. 80    Atherosclerosis of native artery of left lower extremity with ulceration of ankle ([facility]) I70.243    Type 2 diabetes mellitus with atherosclerosis of native arteries of extremity with intermittent claudication ([facility]) E11.51, I70.219    PAD (peripheral artery disease) ([facility]) I73.9    Critical lower limb ischemia I99.8    Pain in both lower extremities M79.604, M79.605    Ulcer of left foot, with fat layer exposed ([facility] L97.522          Procedure Note  Indications:  Based on my examination of this patient's wound(s)/ulcer(s) today, debridement is required to promote healing and evaluate the wound base. Performed by: Charity Castro DPM    Consent obtained:  Yes    Time out taken:  Yes    Pain Control: Anesthetic  Anesthetic: 4% Lidocaine Liquid Topical       Debridement:Excisional Debridement    Using curette the wound(s)/ulcer(s) was/were sharply debrided down through and including the removal of epidermis, dermis and subcutaneous tissue.         Devitalized Tissue Debrided:  fibrin and biofilm    Wound/Ulcer #: 1    Wound Measurements: Assessment of the wounds are pre debridement:  Wound 06/17/19 Heel Left;Plantar #1 (present since 12/2018) (Active)   Wound Image   6/17/2019  1:58 PM   Wound Diabetic Busby 2 6/17/2019  1:58 PM   Wound Cleansed Rinsed/Irrigated with saline orders at this visit:   Orders Placed This Encounter   Procedures    Wound care       Smoking Cessation: Counseling given: Not Answered      Written patient dismissal instructions given to patient and signed by patient or POA. Discharge [id number] [facility] Physician Orders and Discharge Instructions  Lidia Kyle [address] [facility], [address]  Telephone: [phone]    [facility]: Phone: [phone]  Fax: [phone] homecare for wound vac therapy [employer]. Wash hands with soap and water prior to and after every dressing change. Wound Cleansing:   · Do not scrub or use excessive force. · With each dressing change, rinse wounds with 0.9% Saline. (May use wound wash or soft contact solution. Both can be purchased at a local drug store). · If unable to obtain saline, may use a gentle soap and water. · Keep wounds dry in the shower unless otherwise instructed by the physician. Vashe wound cleanser:  [] Instructions for Vashe Wash solution: Apply enough Vash to soak gauze and place on wound bed for 5 minutes. DO NOT rinse after Vashe has been applied. Follow dressing application as instructed below.     Topical Treatments:  Do not apply lotions, creams, or ointments to wound bed unless directed as followed:       Dressings:           Wound Location:left plantar  foot      [x] Apply Primary Dressing to wound:       [] Foam/Foam with Border(i.e Mepilex) [] Non-adherent (i.e.Mepitel)   [x] Alginate with Silver (i.e. Silvercel)  [] Alginate (i.e. Aquacel)   [] Collagen (i.e. Puracol)   [] Collagen with Silver(i.e. Dalila)     [] Hydrocolloid    [] Hydrafera Blue moistened with saline   [] MediHoney Paste/Gel   [] Hydrogel    [] Endoform      [] Santyl covered with gauze moisten with saline      [] Betadine   [] Bactroban/Mupirocin   [] Polysporin/Neosporin  [] Other:    [] Saline \"wet to dry\" gauze     Pack wound loosely with: [] Iodoform   [] Plain Packing  [] Saline \"wet to dry\"      [x] Cover and Secure with:     [x] Dry Gauze  [] ABD  [] Cast padding  [x] Kerlix or Lo rolled gauze   [] Coban  [] Ace Wrap [] Ace Wrap from forefoot to just below the knee  [] Paper Tape [] Medipore Tape [] Other:    Avoid contact of tape with skin if possible. [x] Change dressing: [] Daily    [] Every Other Day  [x] Three times per week:  [] Monday, Wednesday, Friday  [x] Tuesday, Thursday, Saturday   [] Once a week  [] Do Not Change Dressing [] Other:           Edema Control:       [x] Elevate leg(s) above the level of the heart for 30 minutes 4-5 times a day and/or when sitting. [x] Avoid prolonged standing in one place. Off-Loading:   [] Off-loading (keeping weight off as much as you can) when: [] walking  [] in bed [] sitting    [x] Total non-weight bearing:  [] Right Leg  [x] Left Leg     [] Partial weight bearing:   [] Right Leg  [] Left Leg     [] Heel weight bearing   [] Toe-touch weight bearing  [] Transfers only    [] No Weight bearing restrictions    [] Assistive Device: [] Walker [] Crutches   [] Wheelchair   [] Roll About   [] Surgical shoe/Darco [] Podus Boot(s)    [] Heel Protector Soft Boot(s) - DO NOT WALK WHILE WEARING    [] Cast/CAM Boot  [] CROW Boot [] Other:      Dietary:  Important dietary reminders:  1. Increase Protein intake (i.e. Lean meats, fish, eggs, legumes, and yogurt)  2. No added salt  3. If diabetic, follow a diabetic diet and check glucose prior to meals or as instructed by your physician.     Dietary Supplements: [] Juventino Big [] 30ml ProMod/ProStat [] none [] Other:   Take supplements twice a day or as directed as followed:           Return Appointment:   DME/Wound Dressing Supplies Provided by:    (Please call them directly to reorder supplies when you run out)   ECF or Home Healthcare:    Wound reassessment visit with Nurse :     Kj Return Appointment: With Nilda Stanley  in  1 Week(s). o Schedule for the next 4 weeks until: Date: 8/1/2019. [x] Orders placed during your visit:   Orders Placed This Encounter   Procedures    Wound care         Your nurse  is:  Lincoln Dao     Electronically signed by Elsa Pierson RN on 6/27/2019 at 4:03 PM     [facility] Information: Should you experience any significant changes in your wound(s) or have questions about your wound care, please contact the [address] [place] [address] at [phone] Monday-Friday from 8:00 am - 4:30 pm except for Wednesdays which hours are from 8:00 am - 2:00 pm.   If you need help with your wound outside these hours and cannot wait until we are again available, contact your PCP or go to the hospital emergency room. PLEASE NOTE: IF YOU ARE UNABLE TO OBTAIN WOUND SUPPLIES, CONTINUE TO USE THE SUPPLIES YOU HAVE AVAILABLE UNTIL YOU ARE ABLE TO REACH US. IT IS MOST IMPORTANT TO KEEP THE WOUND COVERED AT ALL TIMES.       [] Patient unable to sign Discharge Instructions given to ECF/Transportation/POA              Electronically signed by Glenn Curran DPM on 6/27/2019 at 5:05 PM

## 2019-07-02 PROBLEM — G62.9 NEUROPATHY: Status: ACTIVE | Noted: 2019-01-01

## 2019-07-02 PROBLEM — R60.0 LOCALIZED EDEMA: Status: ACTIVE | Noted: 2019-01-01

## 2019-07-02 PROBLEM — E11.621 DIABETIC ULCER OF LEFT HEEL ASSOCIATED WITH TYPE 2 DIABETES MELLITUS (HCC): Status: ACTIVE | Noted: 2019-01-01

## 2019-07-02 PROBLEM — L97.429 DIABETIC ULCER OF LEFT HEEL ASSOCIATED WITH TYPE 2 DIABETES MELLITUS (HCC): Status: ACTIVE | Noted: 2019-01-01

## 2019-07-02 PROBLEM — E66.01 MORBID OBESITY (HCC): Status: ACTIVE | Noted: 2019-01-01

## 2019-07-03 NOTE — PROGRESS NOTES
bilaterally  Neck: supple and non-tender without mass, no thyromegaly or thyroid nodules, no cervical lymphadenopathy  Pulmonary/Chest: clear to auscultation bilaterally- no wheezes, rales or rhonchi, normal air movement, no respiratory distress  Cardiovascular: normal rate, regular rhythm, normal S1 and S2, no murmurs, rubs, clicks, or gallops, distal pulses intact  Abdomen: soft, non-tender, non-distended, normal bowel sounds, no masses or organomegaly  Extremities: no cyanosis, clubbing. 3 + edema lt leg. Plantar lt heel ulcer as scribed. No overt infection. Musculoskeletal: normal range of motion, no joint swelling, deformity or tenderness  Neurologic:  no cranial nerve deficit, gait, coordination and speech normal      Assessment:      Patient Active Problem List   Diagnosis Code    Diabetic foot infection ([facility] E11.628, L08.9    Chronic kidney disease with end stage renal failure on dialysis ([facility]) N18.6, Z99.2    Leukocytosis D72.829    Osteomyelitis of toe of left foot ([facility]) M86.9    Cellulitis of left lower extremity L03. 80    Atherosclerosis of native artery of left lower extremity with ulceration of ankle ([facility]) I70.243    Type 2 diabetes mellitus with atherosclerosis of native arteries of extremity with intermittent claudication ([facility]) E11.51, I70.219    PAD (peripheral artery disease) ([facility]) I73.9    Critical lower limb ischemia I99.8    Pain in both lower extremities M79.604, M79.605    Ulcer of left foot, with fat layer exposed ([address] L97.522    Localized edema R60.0    Diabetic ulcer of left heel associated with type 2 diabetes mellitus ([address] E11.621, L97.429    Neuropathy G62.9    Morbid obesity ([facility]) E66.01        Procedure Note  Indications:  Based on my examination of this patient's wound(s)/ulcer(s) today, debridement is required to promote healing and evaluate the wound base.     Performed by: Elliott Paget, MD    Consent obtained:  Yes    Time out taken:  Yes    Pain

## 2019-07-09 NOTE — PROGRESS NOTES
Faxed wound care orders to [facility] in epic
them directly to reorder supplies when you run out)  [facility] or Home Healthcare: [facility]: Phone: [phone]  Fax: [phone]   Wound reassessment visit with Nurse :     Kailyn Coppola Return Appointment: With Dr. Pop Clements  in  [address] [place] [address]). [x] Orders placed during your visit:   Orders Placed This Encounter   Procedures   Matthew Fontana MD, Vascular Surgery, [facility]         Your nurse  is:  Tamra Obando     Electronically signed by Aubrey Mahoney RN on 7/9/2019 at 10:42 AM     [address] [facility] [address] Information: Should you experience any significant changes in your wound(s) or have questions about your wound care, please contact the [address] [place] [address] at [phone]. Our hours vary so please leave a message. Please give us 24-48 hours to return your call. If you need help with your wounds and cannot wait until we are available, contact your PCP or go to the hospital emergency room. PLEASE NOTE: IF YOU ARE UNABLE TO OBTAIN WOUND SUPPLIES, CONTINUE TO USE THE SUPPLIES YOU HAVE AVAILABLE UNTIL YOU ARE ABLE TO REACH US. IT IS MOST IMPORTANT TO KEEP THE WOUND COVERED AT ALL TIMES.       [] Patient unable to sign Discharge Instructions given to ECF/Transportation/POA            Electronically signed by Quinn Almonte MD on 7/9/2019 at 11:25 AM

## 2019-07-12 PROBLEM — S80.12XA HEMATOMA OF LOWER EXTREMITY, LEFT, INITIAL ENCOUNTER: Status: ACTIVE | Noted: 2019-01-01

## 2019-07-12 PROBLEM — M79.89 LEG SWELLING: Status: ACTIVE | Noted: 2019-01-01

## 2019-07-12 NOTE — PROGRESS NOTES
Subjective:      Patient ID: Toyin Stephen is a 61 y.o. female. HPI     The patient suffered a fall in November 2018 resulting in a hematoma in her left calf. The patient describes pain as a 6 on a scale of 1-10, but reports that the majority of her pain comes from her left heel ulcer. She reports that the discomfort from the hematoma is slowly improving. She has associated symptoms of significant edema, but is unable to apply compression d/t wound vac on her left heel ulcer. She is currently being treated for a left heel ulcer that developed in February 2019 at the [address] [place] [address]. She is diabetic and is on HD for end stage renal disease. Hx of an angiogram on 6/6/19 showing normal 3-vessel runoff to the foot. The patient's prognosis for healing her left heel wound from a vascular standpoint was declared good. The patient states that she is not interested in surgical evacuation of the hematoma at this time, she wants her left heel to be healed prior to making any further decision. Review of Systems   Constitutional: Negative for chills and fever. Cardiovascular: Positive for leg swelling. Skin: Positive for color change and wound (left heel). All other systems reviewed and are negative. No Known Allergies    Prior to Visit Medications    Medication Sig Taking? Authorizing Provider   B Complex-C-Folic Acid (LILIA CAPS) 1 MG CAPS Take 1 capsule by mouth daily 3x a week only Yes Historical Provider, MD   aspirin 81 MG tablet Take 81 mg by mouth daily Yes Historical Provider, MD   nystatin (MYCOSTATIN) 306924 UNIT/GM ointment Apply topically 3 times daily Apply topically 2 times daily. Yes Historical Provider, MD   spironolactone (ALDACTONE) 25 MG tablet Take 25 mg by mouth daily Yes Historical Provider, MD   triamcinolone (KENALOG) 0.1 % ointment Apply topically 2 times daily Apply topically 2 times daily.  Yes Historical Provider, MD   insulin glargine (LANTUS) 100 UNIT/ML guarding. Musculoskeletal: Normal range of motion. She exhibits edema (+3 pitting edema; right ankle measures 30.3 cm, calf 51.6 cm; left ankle measures 39.9 cm, calf 59.3 cm). Feet:    Neurological: She is alert and oriented to person, place, and time. Skin: Skin is warm and dry. No rash noted. Psychiatric: She has a normal mood and affect. Her speech is normal and behavior is normal. Thought content normal. Cognition and memory are normal.   Vitals reviewed. Assessment:        Diagnosis   1. Hematoma of lower extremity, left, initial encounter    2. Leg swelling        Patient is not interested in any surgical intervention at this time. She prefers her left heel to be healed prior to making any further decision. Plan:         Return if symptoms worsen or fail to improve.         TAYA Mcintyre - CNP

## 2019-07-16 PROBLEM — E87.5 HYPERKALEMIA: Status: ACTIVE | Noted: 2019-01-01

## 2019-07-16 PROBLEM — E66.9 OBESITY: Status: ACTIVE | Noted: 2019-01-01

## 2019-07-16 NOTE — ED PROVIDER NOTES
(mini-bag)    vancomycin (VANCOCIN) 2,000 mg in dextrose 5 % 500 mL IVPB    insulin regular (HUMULIN R;NOVOLIN R) injection 10 Units    DISCONTD: sodium polystyrene (KAYEXALATE) 15 GM/60ML suspension 15 g    glucose (GLUTOSE) 40 % oral gel 15 g    dextrose 50 % IV solution    glucagon (rDNA) injection 1 mg    dextrose 5 % solution    sodium polystyrene (KAYEXALATE) 15 GM/60ML suspension 30 g    DISCONTD: calcium gluconate 10 % injection 1 g    calcium gluconate 10 % injection 2 g       CONSULTS:  PHARMACY TO DOSE VANCOMYCIN  IP CONSULT TO HOSPITALIST  IP CONSULT TO NEPHROLOGY  IP CONSULT TO SOCIAL WORK  IP CONSULT TO [address] / ASSESSMENT / [place] is a 61 y.o. female with a history of ESRD (HD yesterday), DM2, HLD, and HTN, who presents with an infected diabetic foot ulcer on her left heel. XR of L foot reveals Acute Osteomyelitis of the L 4th distal phalanx. Treating with IV Zosyn + Vanc. Renal panel revealed K 6.0, BUN 55, Cr 9.3. Consulted Nephrology: will perform HD tomorrow morning. EKG shows some peaked T-waves. Treated Hyperkalemia with Insulin, Ca-Gluconate, & Kayexalate. Patient is stable. Plan to admit to medicine. This patient was also evaluated by the attending physician. All care plans were discussed and agreed upon. Clinical Impression     1. Diabetic ulcer of left heel associated with diabetes mellitus due to underlying condition, with fat layer exposed ([address]    2.  Cellulitis of left lower extremity        Disposition     PATIENT REFERRED TO:  Daily العراقي            DISCHARGE MEDICATIONS:  New Prescriptions    No medications on file       DISPOSITION Admitted 07/16/2019 06:26:23 PM   - Admit to Orion Krishnan MD  Resident  07/16/19 4905

## 2019-07-16 NOTE — H&P
non-tender,non-distended with normal bowel sounds. Musculoskeletal: Decreased Range of Motion in Left Leg d/t pain, Tenderness to Palpation on right shin, No clubbing, cyanosis or edema bilaterally. Skin: Left Shin down to heel is erythematous and slightly warm, Exquisitely tender to palpation, 2.5 x 3.5 ulcer on the heel of the right foot, no obvious purulence noted  Neurologic:  Neurovascularly intact without any focal sensory/motor deficits. Cranial nerves: II-XII intact, grosslynon-focal.  Psychiatric:  Alert and oriented, thought content appropriate, normal insight  Capillary Refill: Brisk,< 3 seconds   Peripheral Pulses: +2 palpable, equal bilaterally                                              ============================================================================  Consults:   PHARMACY TO DOSE VANCOMYCIN  IP CONSULT TO HOSPITALIST  IP CONSULT TO NEPHROLOGY  IP CONSULT TO PHARMACY  IP CONSULT TO PODIATRY  IP CONSULT TO SOCIAL WORK  ============================================================================  Laboratory Data:    CBC:   Lab Results   Component Value Date    WBC 12.7 (H) 07/16/2019    HGB 9.6 (L) 07/16/2019    HCT 29.4 (L) 07/16/2019    .3 (H) 07/16/2019     07/16/2019       BMP:   Lab Results   Component Value Date    CREATININE 9.3 (HH) 07/16/2019    BUN 55 (H) 07/16/2019     (L) 07/16/2019    K 6.0 (HH) 07/16/2019    CL 89 (L) 07/16/2019    CO2 25 07/16/2019    PHOS 6.3 (H) 07/16/2019       LFT's: No results for input(s): AST, ALT, ALB, BILITOT, ALKPHOS in the last 72 hours. INR: No results for input(s): INR in the last 72 hours. Troponin: No results for input(s): TROPONINI in the last 72 hours. BNP:No results for input(s): BNP in the last 72 hours. ABGs: No results for input(s): PHART, VFU7HGE, PO2ART in the last 72 hours.     U/A:No results for input(s): NITRITE, COLORU, PHUR, LABCAST, WBCUA, RBCUA, MUCUS, TRICHOMONAS, YEAST, BACTERIA, CLARITYU,

## 2019-07-16 NOTE — ED TRIAGE NOTES
Pt. Comes to ED with c/o left leg infection and diabetic ulcer, she was at wound clinic and she was sent to ED for evaluation of possible infection.

## 2019-07-17 PROBLEM — E08.621 DIABETIC ULCER OF LEFT HEEL ASSOCIATED WITH DIABETES MELLITUS DUE TO UNDERLYING CONDITION, WITH FAT LAYER EXPOSED (HCC): Status: ACTIVE | Noted: 2019-01-01

## 2019-07-17 PROBLEM — E11.65 POORLY CONTROLLED TYPE 2 DIABETES MELLITUS (HCC): Status: ACTIVE | Noted: 2019-01-01

## 2019-07-17 PROBLEM — L97.422 DIABETIC ULCER OF LEFT HEEL ASSOCIATED WITH DIABETES MELLITUS DUE TO UNDERLYING CONDITION, WITH FAT LAYER EXPOSED (HCC): Status: ACTIVE | Noted: 2019-01-01

## 2019-07-17 NOTE — CONSULTS
(rDNA) injection 1 mg, 1 mg, Intramuscular, PRN  dextrose 5 % solution, 100 mL/hr, Intravenous, PRN  sodium chloride flush 0.9 % injection 10 mL, 10 mL, Intravenous, 2 times per day  sodium chloride flush 0.9 % injection 10 mL, 10 mL, Intravenous, PRN  ondansetron (ZOFRAN) injection 4 mg, 4 mg, Intravenous, Q6H PRN  bisacodyl (DULCOLAX) suppository 10 mg, 10 mg, Rectal, Daily PRN  acetaminophen (TYLENOL) tablet 650 mg, 650 mg, Oral, Q6H PRN  aspirin chewable tablet 81 mg, 81 mg, Oral, Daily  amLODIPine (NORVASC) tablet 10 mg, 10 mg, Oral, Daily  docusate sodium (COLACE) capsule 100 mg, 100 mg, Oral, Daily  oxyCODONE-acetaminophen (PERCOCET) 7.5-325 MG per tablet 1 tablet, 1 tablet, Oral, Q6H PRN  pravastatin (PRAVACHOL) tablet 20 mg, 20 mg, Oral, Nightly  sevelamer (RENVELA) tablet 3,200 mg, 3,200 mg, Oral, TID WC  insulin lispro (HUMALOG) injection pen 0-6 Units, 0-6 Units, Subcutaneous, TID WC  insulin lispro (HUMALOG) injection pen 0-3 Units, 0-3 Units, Subcutaneous, Nightly  piperacillin-tazobactam (ZOSYN) 3.375 g in dextrose 5 % 100 mL IVPB extended infusion (mini-bag), 3.375 g, Intravenous, Q12H  insulin glargine (LANTUS) injection pen 30 Units, 30 Units, Subcutaneous, Nightly  levothyroxine (SYNTHROID) tablet 50 mcg, 50 mcg, Oral, QAM AC  cyclobenzaprine (FLEXERIL) tablet 10 mg, 10 mg, Oral, Nightly  heparin (porcine) injection 5,000 Units, 5,000 Units, Subcutaneous, 3 times per day  Allergies:   Patient has no known allergies. Social History:    TOBACCO:   reports that she quit smoking about 3 years ago. Her smoking use included cigarettes. She has a 5.00 pack-year smoking history. She has never used smokeless tobacco.  ETOH:   reports that she does not drink alcohol. Family History:   History reviewed. No pertinent family history.   REVIEW OF SYSTEMS:        PHYSICAL EXAM:      Vitals:    BP (!) 164/66   Pulse 82   Temp 98.9 °F (37.2 °C) (Oral)   Resp 16   Ht 5' 7\" (1.702 m)   Wt 300 lb (136.1 kg) AMY  Podiatric Resident PGY1  [phone]  7/17/2019, 8:40 AM    Patient was seen and evaluated at bedside. Agree with residents assessment and treatment plan.   Cresenciano Lanes, DPM

## 2019-07-17 NOTE — CONSULTS
(SYNTHROID) 50 MCG tablet Take 50 mcg by mouth Daily      insulin glargine (LANTUS) 100 UNIT/ML injection vial Inject 30 Units into the skin nightly      insulin lispro (HUMALOG) 100 UNIT/ML injection vial Inject 5 Units into the skin 3 times daily (before meals) If BG > or = 150      B Complex-C-Folic Acid (LILIA CAPS) 1 MG CAPS Take 1 capsule by mouth daily       aspirin 81 MG tablet Take 81 mg by mouth daily      nystatin (MYCOSTATIN) 026031 UNIT/GM ointment Apply topically 3 times daily Apply topically 2 times daily.  spironolactone (ALDACTONE) 25 MG tablet Take 25 mg by mouth 2 times daily       triamcinolone (KENALOG) 0.1 % ointment Apply topically 2 times daily Apply topically 2 times daily.  norethindrone (AYGESTIN) 5 MG tablet Take 5 mg by mouth 2 times daily       pravastatin (PRAVACHOL) 20 MG tablet Take 20 mg by mouth nightly       sevelamer (RENVELA) 800 MG tablet Take 4 tablets by mouth 3 times daily (with meals)      amLODIPine (NORVASC) 10 MG tablet Take 10 mg by mouth daily         Allergies:  Patient has no known allergies.     Social History:    Social History     Socioeconomic History    Marital status:      Spouse name: Not on file    Number of children: Not on file    Years of education: Not on file    Highest education level: Not on file   Occupational History    Not on file   Social Needs    Financial resource strain: Not on file    Food insecurity:     Worry: Not on file     Inability: Not on file    Transportation needs:     Medical: Not on file     Non-medical: Not on file   Tobacco Use    Smoking status: Former Smoker     Packs/day: 0.25     Years: 20.00     Pack years: 5.00     Types: Cigarettes     Last attempt to quit: 7/17/2016     Years since quitting: 3.0    Smokeless tobacco: Never Used   Substance and Sexual Activity    Alcohol use: No    Drug use: No    Sexual activity: Yes     Partners: Male   Lifestyle    Physical activity:     Days per 07/17/2019    BUN 65 07/17/2019    LABALBU 3.1 07/17/2019    CREATININE 10.3 07/17/2019    CALCIUM 9.1 07/17/2019    GFRAA 5 07/17/2019    GFRAA 26 08/02/2011    LABGLOM 4 07/17/2019    GLUCOSE 170 07/17/2019       IMPRESSION/RECOMMENDATIONS:      1. [facility], we will arrange dialysis today   2. Hyperkalemia should correct with dialysis   3. DFI will be seen by surgery ,on [facility] for now, would consider an  ID consult   4. HTN monitor on home meds   5. DM2 on BBI     Thank you for allowing me to participate in the care of this patient. I will continue to follow along. Please call with questions.     Fidel Valenzuela MD

## 2019-07-17 NOTE — CONSULTS
General Surgery   Medical Student Consult      Chief Complaint: Heel Ulcer    History obtained from: Patient    Reason for Consult: Heel ulcer and pain    History of Present Illness: Fredis Sierra is a 61 y.o. female with Hx of T2DM, peripheral neuropathy, HTN, ESRD (hemodyalysis) is being consulted for heel ulceration and pain that started around 5 months ago. After being seen in the wound care clinic (by Dr. Marjorie Starks), patient was advised to visit ED or IV Abx due to a development of painful and erythematous L foot and leg. Patient, also mentions that she has a shooting knee pain that is worsened by walking  and located around her hematoma that happened after a fall last November and she had a cortisone yesterday without relief. Denies fever/chill, headache, nausea, vomiting, chest pain, SOB, abd pain or dysuria. Past Medical History:        Diagnosis Date    Arthritis     Diabetes mellitus [facility]     ESRD (end stage renal disease) [facility]     Hemodialysis patient ([facility]     Hyperlipidemia     Hypertension     Thyroid disease        Past Surgical History:           Procedure Laterality Date    FOOT SURGERY Left 08/15/2017    LEFT SECOND TOE INCISION AND DEBRIDEMENT OF TISSUE AND BONE WITH OPEN ARTHROPLASTY    OTHER SURGICAL HISTORY Left 08/17/2017    INCISION AND DRAINAGE, 2ND DIGIT AMPUTATION LEFT FOOT       Allergies:  Patient has no known allergies. Medications:   Home Meds  No current facility-administered medications on file prior to encounter. Current Outpatient Medications on File Prior to Encounter   Medication Sig Dispense Refill    cephALEXin (KEFLEX) 500 MG capsule Take 500 mg by mouth daily      oxyCODONE-acetaminophen (PERCOCET) 7.5-325 MG per tablet Take 1 tablet by mouth every 6 hours as needed for Pain.        bumetanide (BUMEX) 1 MG tablet Take 2 mg by mouth 2 times daily      cyclobenzaprine (FLEXERIL) 10 MG tablet Take 10 mg by mouth nightly      levothyroxine

## 2019-07-17 NOTE — PROGRESS NOTES
[facility]  Progress Note and Procedure Note      Rafal Rogers  MEDICAL RECORD NUMBER:  [id number]  AGE: 61 y.o. GENDER: female  : 1960  EPISODE DATE:  2019    Subjective:     Chief Complaint   Patient presents with    Wound Check     left heel         HISTORY of PRESENT ILLNESS HPI    Keri Kennedy a 61 y.o. female who presents today for wound/ulcer evaluation. History of Wound Context:  Morbidly Obese Diabetic female with Neuropathy and with ESRD/HD. Patient relates she has a hematoma on her lateral  left leg from a fall since 2018  and a wound on the plantar left heel since 2019. She saw Savannah Yoo at [facility]  for the hematoma last week and has been recommended against surgery.      Associated Signs/Symptoms: edema, drainage     Ulcer Identification:  Ulcer Type: diabetic  Contributing Factors: edema, diabetes and chronic pressure, obesity, ESRD     Wound: N/A      Today : pt has significant pain in her left knee and is unable to get up on the examining table. She is seen sitting in the wheelchair and hardly able to extend her lt knee on a foot stool so I can examine her heel ulcer. Pt apparently went to [facility] ED yesterday d/t the increased pain in her knee but was discharged as she did not have any other constituitional symptoms or findings. She has an appointment with her Orthopedic surgeon today at 1:45 pm. Pt also reports increased swelling of the lt leg lateral aspect where she is known to have a chronic hematoma.             PAST MEDICAL HISTORY        Diagnosis Date    Arthritis     Diabetes mellitus ([address]     [facility]     Hemodialysis patient ([facility]     Hyperlipidemia     Hypertension     Thyroid disease        PAST SURGICAL HISTORY    Past Surgical History:   Procedure Laterality Date    FOOT SURGERY Left 08/15/2017    LEFT SECOND TOE INCISION AND DEBRIDEMENT OF TISSUE AND BONE WITH OPEN ARTHROPLASTY    OTHER reminders:  1. Increase Protein intake (i.e. Lean meats, fish, eggs, legumes, and yogurt)  2. No added salt  3. If diabetic, follow a diabetic diet and check glucose prior to meals or as instructed by your physician. If you are still having pain after you go home:   Elevate the affected limb.  Use over-the-counter medications you would normally use for pain as permitted by your family doctor.  For persistent pain not relieved by the above interventions, please call your family doctor. Return Appointment:   DME/Wound Dressing Supplies Provided by:    (Please call them directly to reorder supplies when you run out)  [facility] or Home Healthcare: [facility]: Phone: [phone]  Fax: [phone]   Wound reassessment visit with Nurse :     Anne Marie Regalado Return Appointment: With Dr. Frankey Harada  in  [address] [place] [address]). Pt advised to go to ED today. [x] Orders placed during your visit: No orders of the defined types were placed in this encounter. Your nurse  is:  Chikis Amador     Electronically signed by Jose Guadalupe Shafer RN on 7/16/2019 at [address] [place] [address] Dr: Should you experience any significant changes in your wound(s) or have questions about your wound care, please contact the [address] [place] [address] at [phone]. Our hours vary so please leave a message. Please give us 24-48 hours to return your call. If you need help with your wounds and cannot wait until we are available, contact your PCP or go to the hospital emergency room. PLEASE NOTE: IF YOU ARE UNABLE TO OBTAIN WOUND SUPPLIES, CONTINUE TO USE THE SUPPLIES YOU HAVE AVAILABLE UNTIL YOU ARE ABLE TO REACH US. IT IS MOST IMPORTANT TO KEEP THE WOUND COVERED AT ALL TIMES.     Physician Signature:_______________________    Date: ___________ Time:  ____________     Physician: Dr Sydney Loja      [] Patient unable to sign Discharge Instructions given to ECF/Transportation/POA            Electronically

## 2019-07-17 NOTE — CONSULTS
polydipsia, polyphagia and polyuria. Genitourinary: Negative for difficulty urinating, dysuria, frequency, hematuria, menstrual problem and vaginal discharge. Musculoskeletal: Negative for arthralgias, joint swelling, myalgias and neck stiffness. Skin: Negative for color change and rash. Left heel ulcer   Allergic/Immunologic: Negative for immunocompromised state. Neurological: Negative for dizziness, seizures, speech difficulty, light-headedness and headaches. Hematological: Negative for adenopathy. Psychiatric/Behavioral: Negative for agitation, hallucinations and suicidal ideas. Objective:       PHYSICAL EXAM:      Vitals:   Vitals:    07/17/19 1257 07/17/19 1409 07/17/19 1823 07/17/19 1901   BP: (!) 154/67 (!) 147/52 (!) 132/58 (!) 149/60   Pulse: 78 78 82 85   Resp: 18 18 20 18   Temp: 98.1 °F (36.7 °C) 98.2 °F (36.8 °C) 98.4 °F (36.9 °C) 98.1 °F (36.7 °C)   TempSrc: Oral   Oral   SpO2: 93%   96%   Weight:  (!) 314 lb 13.1 oz (142.8 kg) (!) 301 lb 2.4 oz (136.6 kg)    Height:           Physical Exam   Constitutional: She is oriented to person, place, and time. She appears well-developed. No distress. HENT:   Head: Normocephalic. Right Ear: External ear normal.   Left Ear: External ear normal.   Nose: Nose normal.   Mouth/Throat: Oropharynx is clear and moist.   Eyes: Pupils are equal, round, and reactive to light. Conjunctivae are normal. Right eye exhibits no discharge. Left eye exhibits no discharge. No scleral icterus. Neck: Normal range of motion. Neck supple. Cardiovascular: Normal rate and regular rhythm. Exam reveals no friction rub. No murmur heard. Pulmonary/Chest: Effort normal. No stridor. She has no wheezes. She has no rales. She exhibits no tenderness. Abdominal: Soft. She exhibits no mass. There is no tenderness. There is no rebound and no guarding. Musculoskeletal: She exhibits no edema or tenderness.    Lymphadenopathy:     She has no cervical

## 2019-07-17 NOTE — PROGRESS NOTES
without obviousdeformity. Pupils equal, round, and reactive to light. Extra ocular muscles intact. Conjunctivae/corneas clear. Neck: Supple, with full range of motion. No jugular venous distention. Trachea midline. Respiratory: +Wheezing, Normal respiratory effort. No Rales or Rhonchi  Cardiovascular: Regular rate and rhythm with normal S1/S2 without murmurs, rubs or gallops. Abdomen: Abdomen is ,non-distended with normal bowel sounds. Musculoskeletal: +Exquisite Tenderness in Right Knee, Decreased Range of Motion in Left Leg d/t pain, Tenderness to Palpation on right shin, No clubbing, cyanosis or edema bilaterally. Skin: Left Shin down to heel is erythematous and slightly warm, Tender to palpation, Foot Ulcer on the heel of the right foot  Neurologic:  Neurovascularly intact without any focal sensory/motor deficits. Cranial nerves: II-XII intact, grosslynon-focal.  Psychiatric:  Alert and oriented, thought content appropriate, normal insight  Capillary Refill: Brisk,< 3 seconds   Peripheral Pulses: +2 palpable, equal bilaterally       Labs:   Recent Labs     07/16/19 1655 07/17/19  0756   WBC 12.7* 10.9   HGB 9.6* 9.1*   HCT 29.4* 26.9*    315     Recent Labs     07/16/19  1655 07/16/19  2017 07/17/19  0206 07/17/19  0756   * 136  --  137   K 6.0* 5.5* 4.9 5.4*   CL 89* 90*  --  90*   CO2 25 25  --  27   BUN 55* 57*  --  65*   CREATININE 9.3* 9.4*  --  10.3*   CALCIUM 9.9 10.4  --  9.1   PHOS 6.3* 6.3*  --  7.1*     No results for input(s): AST, ALT, BILIDIR, BILITOT, ALKPHOS in the last 72 hours. No results for input(s): INR in the last 72 hours. No results for input(s): Gar [place] in the last 72 hours. Urinalysis:    No results found for: Valiant Dowling, BACTERIA, RBCUA, BLOODU, SPECGRAV, Cynthia [address]    Radiology:  XR FOOT LEFT (MIN 3 VIEWS)   Final Result      Acute osteomyelitis in the 4th distal phalanx.       XR TIBIA FIBULA LEFT (2 VIEWS)    (Results Pending)

## 2019-07-17 NOTE — PROGRESS NOTES
Zosyn 3.375g IV q8h ordered for patient. This medication is renally eliminated. Will change to Zosyn 3.375g IV q12h per renal dose adjustment policy. Estimated Creatinine Clearance: 9 mL/min (A) (based on SCr of 9.4 mg/dL [facility])). Patient is receiving HD. Pharmacy will continue to monitor renal function and adjust dose as necessary. Please call with any questions. Thanks!   Clara Lindsey, PharmD  [phone]  7/16/2019 9:41 PM

## 2019-07-17 NOTE — PROGRESS NOTES
surgical history that includes Foot surgery (Left, 08/15/2017) and other surgical history (Left, 08/17/2017). Restrictions  Position Activity Restriction  Other position/activity restrictions: up with assist, NWB L (per podiatry notes)     Vision/Hearing  Vision: Impaired  Vision Exceptions: Wears glasses at all times  Hearing: Within functional limits       Subjective  General  Chart Reviewed: Yes  Additional Pertinent Hx: 62 yo admitted 7/16/19 for L foot OM/full thickness heel wound. Vascular and podiatry consults; L foot XR positive OM 4th toe. Pmhx: DM with peripheral neuropathy, HTN, mulitple L foot I and D with 2nd toe amputation, ESRD on dialysis, obesity. Family / Caregiver Present: Yes(spouse)  Diagnosis: L foot OM/full thickness heel wound  Follows Commands: Within Functional Limits  Subjective  Subjective: Pt found supine in  bed and agreeable to PT with encouragement. \" I don't think I can do anything with all of this pain in my knee. \" Pt reports she has been receiving L knee cortisone shots for 3 years and just had one yesterday.    Pain Screening  Patient Currently in Pain: Yes(rates L knee pain 12/10, RN aware)         Orientation  Orientation  Overall Orientation Status: Within Functional Limits     Social/Functional History  Social/Functional History  Lives With: Spouse  Type of Home: House  Home Layout: One level  Home Access: Stairs to enter with rails(3+1)  Bathroom Shower/Tub: Tub/Shower unit  Bathroom Toilet: Standard(sink next to commode)  Bathroom Equipment: Hand-held shower(shower stool)  Home Equipment: Christy Hernández  ADL Assistance: Independent  Homemaking Assistance: Needs assistance( does all home mgt)  Homemaking Responsibilities: No  Ambulation Assistance: Independent(with cane)  Transfer Assistance: Independent  Active : Yes       Objective          AROM RLE (degrees)  RLE AROM: WFL(limited by body habitus)  AROM LLE (degrees)  LLE AROM : (ankle WFL; knee

## 2019-07-17 NOTE — PROGRESS NOTES
Pt return from HD. Pt requesting dinner-- dietary called. VSS. C/o 11/10 pain to left knee. Received percocet once in HD. Pt does not want dilaudid at this time as she does not like that it makes her \"loopy\" but said that she will ask for it if the pain gets worse.

## 2019-07-17 NOTE — CONSULTS
List of Home Medications the patient is currently taking is complete. Source of medications in list is patient interview and medication bottles brought from home. The following medications were added to the admission med list:  - Renvela 3 tablets with snacks  - Excedrin q6h PRN  - APAP 650 mg q6h PRN  - Ibuprofen 200 mg q6h PRN    The following medication doses were changed in admission med list:  - Percocet 7.5-325 mg q6h PRN adjusted to 5-325 mg q6h PRN  - Humalog TID per sliding scale adjusted to 5 units TID if BG > or = 150    Please call with questions.   Marek Mcnamara, PharmD, [facility]  Wireless: [id number]  Main pharmacy: [id number]  7/17/2019 11:26 AM

## 2019-07-17 NOTE — PLAN OF CARE
Problem: Falls - Risk of:  Goal: Will remain free from falls  Description  Will remain free from falls  Outcome: Ongoing  Goal: Absence of physical injury  Description  Absence of physical injury  Outcome: Ongoing     Problem: Discharge Planning:  Goal: Discharged to appropriate level of care  Description  Discharged to appropriate level of care  Outcome: Ongoing     Problem:  Body Temperature - Imbalanced:  Goal: Ability to maintain a body temperature in the normal range will improve  Description  Ability to maintain a body temperature in the normal range will improve  Outcome: Ongoing     Problem: Mobility - Impaired:  Goal: Mobility will improve to maximum level  Description  Mobility will improve to maximum level  Outcome: Ongoing     Problem: Pain:  Goal: Pain level will decrease  Description  Pain level will decrease  Outcome: Ongoing  Goal: Control of acute pain  Description  Control of acute pain  Outcome: Ongoing  Goal: Control of chronic pain  Description  Control of chronic pain  Outcome: Ongoing     Problem: Skin Integrity - Impaired:  Goal: Will show no infection signs and symptoms  Description  Will show no infection signs and symptoms  Outcome: Ongoing  Goal: Absence of new skin breakdown  Description  Absence of new skin breakdown  Outcome: Ongoing     Problem: Skin Integrity:  Goal: Will show no infection signs and symptoms  Description  Will show no infection signs and symptoms  Outcome: Ongoing  Goal: Absence of new skin breakdown  Description  Absence of new skin breakdown  Outcome: Ongoing     Problem: Pain:  Goal: Pain level will decrease  Description  Pain level will decrease  Outcome: Ongoing  Goal: Control of acute pain  Description  Control of acute pain  Outcome: Ongoing  Goal: Control of chronic pain  Description  Control of chronic pain  Outcome: Ongoing

## 2019-07-18 NOTE — PROGRESS NOTES
Aarti Terry, UROBILINOGEN, BILIRUBINUR, BLOODU, GLUCOSEU, AMORPHOUS in the last 72 hours. Invalid input(s): Veronique Hand   -----------------------------------------------------------------  RAD:   LASER SKIN PERFUSION PRESSURE STUDY         XR TIBIA FIBULA LEFT (2 VIEWS)   Final Result   Impression: No acute fracture or bony destruction. No discrete soft tissue abnormality. Degenerative changes of the knee, as above. XR FOOT LEFT (MIN 3 VIEWS)   Final Result      Acute osteomyelitis in the 4th distal phalanx. NM INFLAMMATORY WBC LIMITED W CERETEC    (Results Pending)   CT FOOT LEFT WO CONTRAST    (Results Pending)   CT TIBIA FIBULA LEFT WO CONTRAST    (Results Pending)   XR KNEE LEFT (3 VIEWS)    (Results Pending)         Assessment/Plan:    Wendy Miles is a 61 y.o. female, who was admitted with DFI, found to have osteomyelitis.     Diabetic foot infection with osteomyelitis identified in the 4th distal phalanx.   -Tibia/fibula Xray without fracture or signs of osteomyelitis  -MRI foot ordered however patient is claustrophobic, will get NM scan  -blood cultures pending  -vanc/zosyn  -Podiatry consulted  -dilaudid for pain control     Knee pain, left knee, hx of OA  -ortho consulted, appreciate recs    Hyperkalemia, improving  -dialysis today     ESRD, MWF  -kidney and hypertension group consulted     HTN  Continue home amlodipine     DMII  Home lantus 30 units BID  Low dose sliding scale     ============================================================================  Code status: Full Code  FEN: Diet NPO, After Midnight  DIET CARB CONTROL; Low Potassium  PPx: Lovenox  Case management: Consulted for home care needs    This patient will be staffed with Rigo Goodwin MD.     [facility]  Internal Medicine Resident, PGY-3  7/18/2019  10:29 AM

## 2019-07-18 NOTE — FLOWSHEET NOTE
07/18/19 1432   Encounter Summary   Services provided to: Patient   Referral/Consult From: Rounding   Continue Visiting   (Seen 7/18/19, NERY.)   Complexity of Encounter Moderate   Length of Encounter 15 minutes   Routine   Type Initial   Assessment Approachable   Intervention Active listening;Nurtured hope   Outcome Expressed gratitude

## 2019-07-18 NOTE — PROGRESS NOTES
to underpenetration    7/18 Laser perfusion vascular study:  Left  Laser Skin Perfusion Pressure study at the left dorsum of the foot is 52 mmHg;  wound healing likely. This is considered to have a good probability for  healing. Laser Skin Perfusion Pressure study at the left lateral plantar area of the  foot is 62 mmHg: wound healing likely. This is considered to have a good  probability for healing. Pulse Volume Recording at the transmetatarsal level reveals moderately  abnormal waveforms.     Scheduled Meds:   sodium hypochlorite   Irrigation Daily    heparin (porcine)  2,500 Units Intravenous Q MWF    [START ON 7/19/2019] vancomycin  1,000 mg Intravenous Once per day on Mon Wed Fri    sodium chloride flush  10 mL Intravenous 2 times per day    aspirin  81 mg Oral Daily    amLODIPine  10 mg Oral Daily    docusate sodium  100 mg Oral Daily    pravastatin  20 mg Oral Nightly    sevelamer  3,200 mg Oral TID WC    insulin lispro  0-6 Units Subcutaneous TID WC    insulin lispro  0-3 Units Subcutaneous Nightly    piperacillin-tazobactam  3.375 g Intravenous Q12H    insulin glargine  30 Units Subcutaneous Nightly    levothyroxine  50 mcg Oral QAM AC    cyclobenzaprine  10 mg Oral Nightly    heparin (porcine)  5,000 Units Subcutaneous 3 times per day       Continuous Infusions:   dextrose         PRN Meds:  sevelamer, HYDROmorphone, glucose, dextrose, glucagon (rDNA), dextrose, sodium chloride flush, ondansetron, bisacodyl, acetaminophen, oxyCODONE-acetaminophen      Assessment:     Obesity, CRF on HD (since 2011), HTN, hyperlipidemia  DM, neuropathy  Hx L 2nd toe amputation  Hx fall / L leg hematoma, since 11/2018 (!)  Venous stasis    L heel ulcer, unclear if extension osteomyelitis   - present 6 mo per pt   - worse and referred for admission from [facility] on 7/16   - unclear if extension OM, unable to tolerate MR, WBC scan and CT ordered    L knee septic arthritis, aspirated by Ortho Hank Benson) this

## 2019-07-18 NOTE — PROGRESS NOTES
07/18/2019       IMPRESSION/RECOMMENDATIONS:      1. ESRD on MWF at [address], we will arrange dialysis today   2. Hyperkalemia resolved   3. Heel  ulcer further ABx per ID. 4. Knee pain ,ortho consult   5. HTN monitor on home meds   6. DM2 on BBI     Thank you for allowing me to participate in the care of this patient. I will continue to follow along. Please call with questions.     Adam Vera MD

## 2019-07-18 NOTE — PROGRESS NOTES
left plantar heel with 50% fibrotic tissue and 50% granular base with a hyperkeratotic rim. Minimal serosanguinous drainage, tracking, or tunneling noted. No probe to bone. Wound measures approximately 3.5 cm x 2.0 x 1.0 cm.        Musculoskeletal: Muscle strength is deferred on the left 2/2 pain, 4/5 on the right. Ankle ROM decreased on left compared to right. Previous left 2nd digit amputation 2/2 osteomyelitis. IMAGING:  Narrative   Vascular LASER SKIN PERFUSION PRESSURE STUDY Procedure       -- PRELIMINARY SONOGRAPHER REPORT --        Demographics        Patient Name       MATTHEW CASTANEDA        Date of Study      07/18/2019        Gender              Female        Patient Number     [id number]        Date of Birth       1960        Visit Number       [id number]         Age                 61 year(s)        Accession Number   [id number]         Room Number         6324        Corporate ID       [id number]          Sonographer         Venita Rodas RVT        Ordering Physician Kelby Plaza MD  Interpreting        [facility]                                         Physician           Readers       Procedure       Type of Study:        Miscellaneous:LASER SKIN PERFUSION PRESSURE STUDY.       Tech Comments   Right   Laser Skin Perfusion Pressure study at the right dorsum of the foot is 62   mmHg; wound healing likely. This is considered to have a good probability for   healing. Laser Skin Perfusion Pressure study at the right lateral plantar area of the   foot is 101 mmHg: wound healing likely. This is considered to have a good   probability for healing. Pulse Volume Recording at the transmetatarsal level reveals moderately   abnormal waveforms.       Left   Laser Skin Perfusion Pressure study at the left dorsum of the foot is 52 mmHg;   wound healing likely. This is considered to have a good probability for   healing.    Laser Skin Perfusion Pressure study at the left lateral plantar area of the through and includes the subcutaneous layer, bleeding tissue; hemostasis with direct pressure. Less than 20 sqcm of wound area was debrided. - patient is to wear prevalon boots at all times while in bed, b/l  - Non-weightbearing to left lower extremity  - IV Zosyn; per ID  - Wound dressed with Dakins, DSD, and ACE from toe to knee; left  - Nuclear medicine Cerotec scan for further evaluation  - CT foot and leg; left ordered for further evaluation  - consult to orthopaedic surgery for severe left knee pain  - SW consult for discharge planning  - PT/OT recommends SNF; patient in agreement with SNF    Discussed assessment and plan with Dr. Angelito Fields    DISPO: Surgical intervention from podiatry standpoint pending Cerotec and CT scans. Will continue to monitor. Syed Theodore  Pager: [phone]  7/18/2019  9:35 AM     Patient was seen and evaluated at bedside. Agree with residents assessment and treatment plan.   Seda Flores DPM

## 2019-07-18 NOTE — CONSULTS
unexplained weight loss, fevers, chills or fatigue  NEUROLOGICAL: Denies unsteady gait or progressive weakness    PSYCHOLOGICAL: Denies anxiety, depression   SKIN: Denies skin changes, delayed healing, rash, itching   HEMATOLOGIC: Denies easy bleeding or bruising  ENDOCRINE: Denies excessive thirst, urination, heat/cold  RESPIRATORY: Denies current dyspnea, cough  CARDIOVASCULAR: Negative for chest pain at this time. EYES: Negative for photophobia and visual disturbance. ENT:  Negative for rhinorrhea, epistaxis, sore throat, or hearing loss. GI: Denies nausea, vomiting, diarrhea   : Denies bowel or bladder issues   MUSCULOSKELETAL: Left knee pain. All other ROS reviewed in chart or with patient or family and are grossly negative. PHYSICAL EXAMINATION:  Ms. Anni Garcia is a very pleasant 61 y.o. female who seen today in no acute distress, awake, alert, and oriented. She is well nourished and groomed. Patient with normal affect. Body mass index is 47.75 kg/m². . Skin warm and dry. Resting respiratory rate is 16. Resp deep and easy. Pulse is with regular rate and rhythm    BP (!) 160/63   Pulse 80   Temp 98.1 °F (36.7 °C) (Oral)   Resp 18   Ht 5' 7\" (1.702 m)   Wt (!) 304 lb 14.3 oz (138.3 kg)   SpO2 94%   BMI 47.75 kg/m²        Airway is intact  Chest: chest clear, no wheezing, rales, normal symmetric air entry, no tachypnea, retractions or cyanosis  Heart: regular rate and rhythm ; heart sounds normal   Hearing intact, pupil equal and reactive bilateral  Lymphatics; No groin or axillary enlarged lymph nodes. Neck; No swelling  Abdomen; soft, non distended. MUSCULOSKELETAL:   Examination of both knees showing decrease ROM, left knee with effusion, tenderness on medial joint line, stable to varus and valgus stress. She has intact sensation and good pedal pulses. She has good strength in 2 planes, and has mild tenderness on deep palpation over the medial joint line. Knee reflex 1+ bilaterally. NEUROLOGIC:   Sensory:    Touch:                     Right Upper Extremity:  normal                   Left Upper Extremity:  normal                  Right Lower Extremity:  Abnormal - neuropathy                  Left Lower Extremity:  abnormal - neuropathy        DATA:    CBC:   Lab Results   Component Value Date    WBC 10.9 07/17/2019    RBC 2.68 07/17/2019    HGB 9.1 07/17/2019    HCT 26.9 07/17/2019    .5 07/17/2019    MCH 33.9 07/17/2019    MCHC 33.8 07/17/2019    RDW 14.7 07/17/2019     07/17/2019    MPV 7.6 07/17/2019     WBC:    Lab Results   Component Value Date    WBC 10.9 07/17/2019     PT/INR:    Lab Results   Component Value Date    PROTIME 12.0 08/14/2017    INR 1.06 08/14/2017     PTT:  No results found for: APTT[APTT    IMAGING: Xray 3 views of the left knee was obtained 7/18/2019 and reviewed. These demonstrate severe degenerative changes with narrowing of the joint space in the medial joint space compartment, subchondral sclerosis, and marginal osteophytosis. There is a large knee effusion. IMPRESSION: Left knee pain/effusion, possible septic arthritis. PLAN: I discussed with the patient the treatment options including both surgical and non-surgical treatment. We recommended aspiration left knee, and she agreed to have. PROCEDURE:    With the patient's permission, and under sterile condition, the left knee was prepared and draped with alcohol and infiltrated with 1% lidocaine through the lateral suprapatellar port area. Aspiration of 90 ml cloudy fluid c/w infection. The patient tolerated the procedure well. A band-aid was applied and the patient was advised to ice the knee for 15-20 minutes to relieve any injection site related pain. Fluid sent for cell count, GS and Cx. We will keep her NPO after MN for surgical arthrotomy and  I&D left knee tomorrow. D/W Dr Aidee Bailon.     Thank you very much for the kind consultation and allowing me to participate in

## 2019-07-19 NOTE — PROGRESS NOTES
Podiatric Surgery Daily Progress Note      Admit Date: 7/16/2019      Hospital day #: 2                                Code:Full Code    Patient seen and examined, labs and records reviewed    Subjective:     Patient seen at bedside. Patient still expresses pain to the left leg and stated that she is aware she is going to get a wash out of her knee after getting the aspiration yesterday. Patient denies f/c/n/v/sob/cp. Review of Systems: A 12 point review of symptoms is unremarkable with the exception of the chief complaint. Patient specifically denies nausea, fever, vomiting, chills, shortness of breath, chest pain, abdominal pain, constipation or difficulty urinating. Objective     BP (!) 151/81   Pulse 86   Temp 98 °F (36.7 °C) (Oral)   Resp 20   Ht 5' 7\" (1.702 m)   Wt (!) 304 lb 14.3 oz (138.3 kg)   SpO2 95%   BMI 47.75 kg/m²      I/O:    Intake/Output Summary (Last 24 hours) at 7/19/2019 0843  Last data filed at 7/18/2019 1832  Gross per 24 hour   Intake 40 ml   Output --   Net 40 ml              Wt Readings from Last 3 Encounters:   07/18/19 (!) 304 lb 14.3 oz (138.3 kg)   07/12/19 (!) 301 lb (136.5 kg)   06/17/19 (!) 301 lb 5.9 oz (136.7 kg)       LABS:    Recent Labs     07/17/19  0756 07/19/19  0811   WBC 10.9 11.8*   HGB 9.1* 9.2*   HCT 26.9* 27.5*    392        Recent Labs     07/18/19  0614      K 4.7   CL 91*   CO2 25   PHOS 6.9*   BUN 39*   CREATININE 6.7*      No results for input(s): PROT, INR, APTT in the last 72 hours.     CBC:   Lab Results   Component Value Date    WBC 11.8 07/19/2019    RBC 2.75 07/19/2019    HGB 9.2 07/19/2019    HCT 27.5 07/19/2019    MCV 99.8 07/19/2019    MCH 33.2 07/19/2019    MCHC 33.3 07/19/2019    RDW 14.5 07/19/2019     07/19/2019    MPV 7.4 07/19/2019     WBC:    Lab Results   Component Value Date    WBC 11.8 07/19/2019     CMP:    Lab Results   Component Value Date     07/18/2019    K 4.7 07/18/2019    CL 91 07/18/2019    CO2 25 07/18/2019    BUN 39 07/18/2019    CREATININE 6.7 07/18/2019    GFRAA 8 07/18/2019    GFRAA 26 08/02/2011    AGRATIO 1.3 08/14/2017    LABGLOM 6 07/18/2019    GLUCOSE 129 07/18/2019    PROT 7.3 08/14/2017    LABALBU 3.2 07/18/2019    CALCIUM 9.4 07/18/2019    BILITOT 0.3 08/14/2017    ALKPHOS 102 08/14/2017    AST 13 08/14/2017    ALT 12 08/14/2017     BUN/Creatinine:    Lab Results   Component Value Date    BUN 39 07/18/2019    CREATININE 6.7 07/18/2019     HgBA1c:    Lab Results   Component Value Date    LABA1C 7.2 07/17/2019     General: Patient is alert and oriented x3. No signs of distress noted. LOWER EXTREMITY EXAMINATION    Dressing to left LE intact. No strikethrough noted to the external dressing. No drainage noted to the internal layers of the dressing. Vascular: DP/PT pulses palpable on the right, faintly palpable on the left 2/2 edema, biphasic on doppler examination. CRF within normal limits b/l. 2+ pitting edema to the left anterior shin, 1+ pitting edema on the dorsum of the left foot. Skin temp warm to cool from proximal to distal within normal limits, left>right. No varicosities present.     Neurological: Gross light touch sensation absent at all pedal sites, b/l.     Dermatologic: Skin is shiny with decreased turgor, absent hair growth. Mild erythema with soft, fluctuant, fluid filled space noted to 75% of left lateral leg.                 - Full thickness ulceration to the left plantar heel with 50% fibrotic tissue and 50% granular base with a hyperkeratotic rim. Minimal serosanguinous drainage. No tracking, tunneling, or probe to bone. Wound measures approximately 3.5 cm x 2.0 x 1.0 cm.        Musculoskeletal: Muscle strength is deferred on the left 2/2 pain, 4/5 on the right. Ankle ROM decreased on left compared to right. Previous left 2nd digit amputation 2/2 osteomyelitis.        IMAGING:  Narrative   Vascular LASER SKIN PERFUSION PRESSURE STUDY Procedure       -- PRELIMINARY

## 2019-07-19 NOTE — PROGRESS NOTES
light.  Extra ocular muscles intact. Conjunctivae/corneas clear. Neck: Supple, with full range of motion. No jugular venous distention. Trachea midline. Respiratory: +Wheezing, Normal respiratory effort. No Rales or Rhonchi  Cardiovascular: Regular rate and rhythm with normal S1/S2 without murmurs, rubs or gallops. Abdomen: Abdomen is ,non-distended with normal bowel sounds. Musculoskeletal: +Exquisite Tenderness in Right Knee, Decreased Range of Motion in Left Leg d/t pain, No clubbing, cyanosis or edema bilaterally. Skin: Left Shin slightly warm, Tender to palpation, Foot Ulcer on the heel of the right foot  Neurologic:  Neurovascularly intact without any focal sensory/motor deficits. Cranial nerves: II-XII intact, grosslynon-focal.  Psychiatric:  Alert and oriented, thought content appropriate, normal insight  Capillary Refill: Brisk,< 3 seconds   Peripheral Pulses: +2 palpable, equal bilaterally       Labs:   Recent Labs     07/16/19  1655 07/17/19  0756 07/19/19  0811   WBC 12.7* 10.9 11.8*   HGB 9.6* 9.1* 9.2*   HCT 29.4* 26.9* 27.5*    315 392     Recent Labs     07/17/19  0756 07/18/19  0614 07/19/19  0811    136 132*   K 5.4* 4.7 5.5*   CL 90* 91* 87*   CO2 27 25 25   BUN 65* 39* 65*   CREATININE 10.3* 6.7* 9.3*   CALCIUM 9.1 9.4 9.5   PHOS 7.1* 6.9* 8.9*     No results for input(s): AST, ALT, BILIDIR, BILITOT, ALKPHOS in the last 72 hours. No results for input(s): INR in the last 72 hours. No results for input(s): Lottie Cesar in the last 72 hours. Urinalysis:    No results found for: Romi Ket, BACTERIA, RBCUA, BLOODU, [address], [address]    Radiology:  CT FOOT LEFT WO CONTRAST   Final Result      Amputation of the second digit at the metatarsophalangeal joint      Cortical erosion involving the second metatarsal head, which may be related to clinically suspected osteomyelitis.       Suspected fibroids infraction involving the third metatarsal head      Recommend MRI foot and lateral leg today  -Bone Scan Ordered     Knee Pain  Hx of Osteoarthritis. In severe pain right now with decreased ROM  -Ortho saw patient yesterday, aspirated knee, plan for I&D with suspected septic joint in knee  -Received Cortisone Injections Outpatient  -Dilaudid held, Morphine ordered for pain control in addition    Hyperkalemia  K-5.5, ESRD on HD MWF.    -Being Dialyzed this morning  -Given D-50, Insulin and Kaexylate  -Nephrology on board     HTN  -Continue Home Amolodipine     Diabetes  Glucose elevated on admission  -30 Lantus with low dose sliding scale        DVT Prophylaxis: ***  Diet: Diet NPO, After Midnight Exceptions are: Ice Chips, Sips with Meds  Code Status: Full Code    PT/OT Eval Status: ***    Dispo - ***    Josiah Azevedo  9:22 AM  [unfilled]    I will discuss the patient with the senior resident and Tommy Henderson MD

## 2019-07-19 NOTE — PROGRESS NOTES
in detail and negative for DM, CAD, Cancer, CVA. Positive as follows:    History reviewed. No pertinent family history. REVIEW OF SYSTEMS:   Pertinent positives as noted in theHPI. All other systems reviewed and negative. ROS: Review of Systems    PHYSICAL EXAM PERFORMED:    BP (!) 146/60   Pulse 78   Temp 98.6 °F (37 °C)   Resp 17   Ht 5' 7\" (1.702 m)   Wt (!) 301 lb 9.4 oz (136.8 kg)   SpO2 94%   BMI 47.24 kg/m²     General appearance:  Noapparent distress, appears stated age and cooperative. HEENT:  Normal cephalic, atraumatic without obviousdeformity. Pupils equal, round, and reactive to light. Extra ocular muscles intact. Conjunctivae/corneas clear. Neck: Supple, with full range of motion. No jugular venous distention. Trachea midline. Respiratory:Normal respiratory effort. Clear to auscultation, bilaterally without Rales/Wheezes/Rhonchi. Cardiovascular:Regular rate and rhythm with normal S1/S2 without murmurs, rubs or gallops. Abdomen: Soft, non-tender,non-distended with normal bowel sounds. Musculoskeletal:  No clubbing, cyanosis or edema bilaterally. Fullrange of motion without deformity. Skin: Skin color, texture, turgor normal.  No rashes or lesions. Neurologic:  Neurovascularly intact without any focal sensory/motor deficits.  Cranial nerves: II-XII intact, grosslynon-focal.  Psychiatric:  Alert and oriented, thought content appropriate, normal insight  Capillary Refill: Brisk,< 3 seconds   Peripheral Pulses: +2 palpable, equal bilaterally       Labs:     Recent Labs     07/16/19  1655 07/17/19  0756 07/19/19  0811   WBC 12.7* 10.9 11.8*   HGB 9.6* 9.1* 9.2*   HCT 29.4* 26.9* 27.5*    315 392     Recent Labs     07/17/19  0756 07/18/19  0614 07/19/19  0811    136 132*   K 5.4* 4.7 5.5*   CL 90* 91* 87*   CO2 27 25 25   BUN 65* 39* 65*   CREATININE 10.3* 6.7* 9.3*   CALCIUM 9.1 9.4 9.5   PHOS 7.1* 6.9* 8.9*     No results for input(s): AST, ALT, BILIDIR, BILITOT, ALKPHOS in

## 2019-07-19 NOTE — ANESTHESIA PRE PROCEDURE
Diagnosis Code    Diabetic foot infection ([facility] E11.628, L08.9    Chronic kidney disease with end stage renal failure on dialysis ([facility]) N18.6, Z99.2    Leukocytosis D72.829    Osteomyelitis of toe of left foot ([facility]) M86.9    Cellulitis of left lower extremity L03. 80    Atherosclerosis of native artery of left lower extremity with ulceration of ankle ([facility]) I70.243    Poorly controlled type 2 diabetes mellitus ([facility]) E11.65    PAD (peripheral artery disease) ([facility]) I73.9    Critical lower limb ischemia I99.8    Pain in both lower extremities M79.604, M79.605    Diabetic ulcer of left heel associated with diabetes mellitus due to underlying condition, with fat layer exposed ([facility] Y81.161, L97.422    Localized edema R60.0    Diabetic ulcer of left heel associated with type 2 diabetes mellitus ([facility]) E11.621, L97.429    Neuropathy G62.9    Obesity E66.9    Hematoma of lower extremity, left, initial encounter S80.12XA    Leg swelling M79.89    Hyperkalemia E87.5    Diabetic polyneuropathy associated with type 2 diabetes mellitus ([facility]) E11.42    Pyogenic arthritis of left knee joint ([facility]) M00.9       Past Medical History:        Diagnosis Date    Arthritis     Diabetes mellitus ([facility]     ESRD (end stage renal disease) ([facility]     Hemodialysis patient ([facility]     Hyperlipidemia     Hypertension     MRSA nasal colonization 07/17/2019    Thyroid disease        Past Surgical History:        Procedure Laterality Date    FOOT SURGERY Left 08/15/2017    LEFT SECOND TOE INCISION AND DEBRIDEMENT OF TISSUE AND BONE WITH OPEN ARTHROPLASTY    OTHER SURGICAL HISTORY Left 08/17/2017    INCISION AND DRAINAGE, 2ND DIGIT AMPUTATION LEFT FOOT       Social History:    Social History     Tobacco Use    Smoking status: Former Smoker     Packs/day: 0.25     Years: 20.00     Pack years: 5.00     Types: Cigarettes     Last attempt to quit: 7/17/2016     Years since quitting: 3.0    Smokeless tobacco: Never Used Substance Use Topics    Alcohol use: No                                Counseling given: Not Answered      Vital Signs (Current):   Vitals:    07/18/19 2120 07/18/19 2243 07/19/19 0844 07/19/19 0919   BP:  (!) 151/81 (!) 146/64 (!) 146/60   Pulse: 86 86 80 78   Resp:  20 18 17   Temp:  98 °F (36.7 °C) 98.3 °F (36.8 °C) 98.6 °F (37 °C)   TempSrc:  Oral Oral    SpO2:  95% 94%    Weight:    (!) 301 lb 9.4 oz (136.8 kg)   Height:                                                  BP Readings from Last 3 Encounters:   07/19/19 (!) 146/60   07/19/19 (!) 145/63   07/16/19 (!) 139/59       NPO Status:                                                   Date of last liquid consumption: 07/18/19                        Date of last solid food consumption: 07/18/19    BMI:   Wt Readings from Last 3 Encounters:   07/19/19 (!) 301 lb 9.4 oz (136.8 kg)   07/12/19 (!) 301 lb (136.5 kg)   06/17/19 (!) 301 lb 5.9 oz (136.7 kg)     Body mass index is 47.24 kg/m².     CBC:   Lab Results   Component Value Date    WBC 11.8 07/19/2019    RBC 2.75 07/19/2019    HGB 9.2 07/19/2019    HCT 27.5 07/19/2019    MCV 99.8 07/19/2019    RDW 14.5 07/19/2019     07/19/2019       CMP:   Lab Results   Component Value Date     07/19/2019    K 5.5 07/19/2019    CL 87 07/19/2019    CO2 25 07/19/2019    BUN 65 07/19/2019    CREATININE 9.3 07/19/2019    GFRAA 5 07/19/2019    GFRAA 26 08/02/2011    AGRATIO 1.3 08/14/2017    LABGLOM 4 07/19/2019    GLUCOSE 220 07/19/2019    PROT 7.3 08/14/2017    CALCIUM 9.5 07/19/2019    BILITOT 0.3 08/14/2017    ALKPHOS 102 08/14/2017    AST 13 08/14/2017    ALT 12 08/14/2017       POC Tests:   Recent Labs     07/19/19  0711   POCGLU 213*       Coags:   Lab Results   Component Value Date    PROTIME 12.0 08/14/2017    INR 1.06 08/14/2017       HCG (If Applicable): No results found for: PREGTESTUR, PREGSERUM, HCG, HCGQUANT     ABGs: No results found for: PHART, PO2ART, ZOK5BUY, PWC7XDF, BEART, B6XVFOGR     Type &

## 2019-07-19 NOTE — PROGRESS NOTES
Pepcid 20 mg BID ordered for patient. This medication is renally eliminated. Will change to Pepcid 20 mg DAILY per renal dose adjustment policy.      Estimated Creatinine Clearance: 9 mL/min (A) (based on SCr of 9.3 mg/dL [facility])).      Pharmacy will continue to monitor renal function and adjust dose as necessary. Please call with any questions.     Thanks!   Ana Sanchez PharmD, [facility] 7/19/2019 7:29 PM

## 2019-07-20 NOTE — PROGRESS NOTES
0.7 oz (136.1 kg)   SpO2 92%   BMI 46.99 kg/m²     Physical Exam:  General appearance:  Appears comfortable. Well nourished. Obese. Eyes: Sclera clear, pupils equal, round, and reactive to light. ENT: Moist mucus membranes, no thrush. Trachea midline. Cardiovascular: Regular rhythm, normal S1, S2. No murmur, gallop, rub. No edema in lower extremities. Respiratory: Clear to auscultation bilaterally, no wheeze, good inspiratory effort. Gastrointestinal: Abdomen soft, non-tender, not distended, normal bowel sounds. Musculoskeletal: No cyanosis in digits, neck supple. Left extremity dressing clean and dry, swollen left knee joint, no erythema  Neurology: Cranial nerves grossly intact. Alert and oriented in time, place and person. No speech or motor deficits. Psychiatry: Appropriate affect. Not agitated. Skin: Warm, dry, normal turgor, no rash. LABS:    CBC:   Recent Labs     07/19/19  0811 07/20/19  0545   WBC 11.8*  --    HGB 9.2* 8.4*   HCT 27.5* 24.9*   MCV 99.8  --      --                                                                 BMP:    Recent Labs     07/18/19  0614 07/19/19  0811    132*   K 4.7 5.5*   CL 91* 87*   CO2 25 25   BUN 39* 65*   CREATININE 6.7* 9.3*   GLUCOSE 129* 220*       LFT's: No results for input(s): AST, ALT, ALB, BILITOT, ALKPHOS in the last 72 hours. Troponin: No results for input(s): TROPONINI in the last 72 hours. BNP: No results for input(s): BNP in the last 72 hours. Lipids: No results for input(s): CHOL, HDL in the last 72 hours. Invalid input(s): LDLCALCU    ABGs: No results found for: PHART, BIK1TUF, PO2ART    INR: No results for input(s): INR in the last 72 hours. U/A:No results for input(s): NITRITE, COLORU, PHUR, LABCAST, WBCUA, RBCUA, MUCUS, TRICHOMONAS, YEAST, BACTERIA, CLARITYU, SPECGRAV, LEUKOCYTESUR, UROBILINOGEN, BILIRUBINUR, BLOODU, GLUCOSEU, AMORPHOUS in the last 72 hours.     Invalid input(s): Armando Jeffers

## 2019-07-20 NOTE — PROGRESS NOTES
CREATININE 9.3 (HH) 07/19/2019    BUN 65 (H) 07/19/2019     (L) 07/19/2019    K 5.5 (H) 07/19/2019    CL 87 (L) 07/19/2019    CO2 25 07/19/2019       Hepatic Function Panel:   Lab Results   Component Value Date    ALKPHOS 102 08/14/2017    ALT 12 08/14/2017    AST 13 08/14/2017    PROT 7.3 08/14/2017    BILITOT 0.3 08/14/2017    LABALBU 3.1 07/19/2019         MICRO:  7/16 BC x2 neg  7/17 MRSA screen +  7/18 Wound cx NGTD, GS no organisms   7/19 Tissue culture NGTD, GS no org    L knee fluid cx NGTD       IMAGING:  CT L foot 7/18:  Impression       Amputation of the second digit at the metatarsophalangeal joint       Cortical erosion involving the second metatarsal head, which may be related to clinically suspected osteomyelitis.       Suspected fibroids infraction involving the third metatarsal head       Recommend MRI foot without with IV contrast for further evaluation of the above findings, as clinically indicated       CT L tib fib 7/18:  Impression       Large subcutaneous fluid collection abutting the anterolateral muscle group with differential diagnosis including chronic hematoma, large seroma or abscess.       Large suprapatellar joint effusion       High-grade tricompartmental degenerative osteoarthritis of the knee       Tagged WBC scan 7/18:  Impression       Probable abscess in the lateral aspect of the left lower leg.       Focal uptake in the plantar surface of the left heel most suggestive of osteomyelitis.        Assessment:     Patient Active Problem List    Diagnosis Date Noted    Abscess of left leg     Mass of left lower leg     Pyogenic arthritis of left knee joint ([address]     Diabetic polyneuropathy associated with type 2 diabetes mellitus ([address]     Hyperkalemia 07/16/2019    Hematoma of lower extremity, left, initial encounter 07/12/2019    Leg swelling 07/12/2019    Localized edema 07/02/2019    Diabetic ulcer of left heel associated with type 2 diabetes mellitus ([address] 07/02/2019

## 2019-07-20 NOTE — PROGRESS NOTES
cooperative. HEENT:  Normal cephalic, atraumatic without obviousdeformity. Pupils equal, round, and reactive to light.  Extra ocular muscles intact. Conjunctivae/corneas clear. Neck: Supple, with full range of motion. No jugular venous distention. Trachea midline. Respiratory: +Wheezing, Normal respiratory effort. No Rales or Rhonchi  Cardiovascular: Regular rate and rhythm with normal S1/S2 without murmurs, rubs or gallops. Abdomen: Abdomen is ,non-distended with normal bowel sounds. Musculoskeletal: Dressing and Drain at left knee, Leg until thigh wrapped with Ace   Skin: Left Shin slightly warm, Tender to palpation, Foot Ulcer on the heel of the right foot  Neurologic:  Neurovascularly intact without any focal sensory/motor deficits. Cranial nerves: II-XII intact, grosslynon-focal.  Psychiatric:  Alert and oriented, thought content appropriate, normal insight  Capillary Refill: Brisk,< 3 seconds   Peripheral Pulses: +2 palpable, equal bilaterally        Labs:   Recent Labs     07/17/19  0756 07/19/19  0811 07/20/19  0545   WBC 10.9 11.8*  --    HGB 9.1* 9.2* 8.4*   HCT 26.9* 27.5* 24.9*    392  --      Recent Labs     07/17/19  0756 07/18/19  0614 07/19/19  0811    136 132*   K 5.4* 4.7 5.5*   CL 90* 91* 87*   CO2 27 25 25   BUN 65* 39* 65*   CREATININE 10.3* 6.7* 9.3*   CALCIUM 9.1 9.4 9.5   PHOS 7.1* 6.9* 8.9*     No results for input(s): AST, ALT, BILIDIR, BILITOT, ALKPHOS in the last 72 hours. No results for input(s): INR in the last 72 hours. No results for input(s): Victoria Self in the last 72 hours. Urinalysis:    No results found for: Inell Broom, BACTERIA, RBCUA, BLOODU, Jenet Ripa, Cynthia [id number]    Radiology:  CT FOOT LEFT WO CONTRAST   Final Result      Amputation of the second digit at the metatarsophalangeal joint      Cortical erosion involving the second metatarsal head, which may be related to clinically suspected osteomyelitis.       Suspected fibroids infraction involving osteoarthritis of the knee- I&D Yesterday    Hyperkalemia  K-???? ESRD on HD MWF.   -Dialyzed yesterday  -Given D-50, Insulin and Kaexylate  -Nephrology on board     HTN  -Continue Home Amolodipine     Diabetes  Glucose elevated on admission  -30 Lantus with low dose sliding scale      DVT Prophylaxis: ***  Diet: Dietary Nutrition Supplements: Standard High Calorie Oral Supplement  DIET GENERAL; Carb Control: 4 carb choices (60 gms)/meal; Low Potassium  Code Status: Full Code    PT/OT Eval Status: ***    Dispo - ***    Melvin Dorcas  7:29 AM  [unfilled]    I will discuss the patient with the senior resident and Mayela Witt MD

## 2019-07-20 NOTE — PROGRESS NOTES
[facility] Orthopedic Surgery  Progress Note        POD # 1, s/p I&D left leg deep abscess and left septic knee arthrotomy. Pt comfortable, no c/o. Drsg left leg D/C/I, unpacked, drain left knee removed  Mild pain with left knee ROM, NVI    CBC:   Lab Results   Component Value Date    WBC 11.8 07/19/2019    RBC 2.75 07/19/2019    HGB 8.4 07/20/2019    HCT 24.9 07/20/2019    MCV 99.8 07/19/2019    MCH 33.2 07/19/2019    MCHC 33.3 07/19/2019    RDW 14.5 07/19/2019     07/19/2019    MPV 7.4 07/19/2019     PT/INR:    Lab Results   Component Value Date    PROTIME 12.0 08/14/2017    INR 1.06 08/14/2017     PTT:  No results found for: APTT[APTT    A/P: s/p I&D left leg deep abscess and left septic knee arthrotomy.   - stable  - PT/OT, WBAT  - Continue IV ABX per ID    Radha Adams MD 7/20/2019 11:04 AM

## 2019-07-20 NOTE — PROGRESS NOTES
Podiatric Surgery Daily Progress Note      Admit Date: 7/16/2019                                      Code:Full Code    Patient seen and examined, labs and records reviewed    Subjective:     Patient seen at bedside this AM.  Patient denies any overnight acute event. Patient was in surgery yesterday performed by Dr. Cristina Chao for the left knee fuild build up. Patient states her left knee is painful. Patient was almost in tears when told we need to change her dressing today. Patient states she is in agreement that upon discharge it would be likely she should go to a skilled nursing facility. Patient denies f/c/n/v/sob/cp. Patient has no pedal complaints at this visit. Review of Systems: A 12 point review of symptoms is unremarkable with the exception of the chief complaint. Patient specifically denies nausea, fever, vomiting, chills, shortness of breath, chest pain, abdominal pain, constipation or difficulty urinating. Objective     BP (!) 145/76   Pulse 83   Temp 98.2 °F (36.8 °C) (Oral)   Resp 18   Ht 5' 7\" (1.702 m)   Wt (!) 300 lb 0.7 oz (136.1 kg)   SpO2 96%   BMI 46.99 kg/m²      I/O:    Intake/Output Summary (Last 24 hours) at 7/20/2019 1153  Last data filed at 7/20/2019 0647  Gross per 24 hour   Intake 570 ml   Output 512 ml   Net 58 ml              Wt Readings from Last 3 Encounters:   07/19/19 (!) 300 lb 0.7 oz (136.1 kg)   07/12/19 (!) 301 lb (136.5 kg)   06/17/19 (!) 301 lb 5.9 oz (136.7 kg)       LABS:    Recent Labs     07/19/19  0811 07/20/19  0545   WBC 11.8*  --    HGB 9.2* 8.4*   HCT 27.5* 24.9*     --         Recent Labs     07/19/19  0811   *   K 5.5*   CL 87*   CO2 25   PHOS 8.9*   BUN 65*   CREATININE 9.3*      No results for input(s): PROT, INR, APTT in the last 72 hours.     CBC:   Lab Results   Component Value Date    WBC 11.8 07/19/2019    RBC 2.75 07/19/2019    HGB 8.4 07/20/2019    HCT 24.9 07/20/2019    MCV 99.8 07/19/2019    MCH 33.2 07/19/2019    MCHC 33.3 07/19/2019    RDW 14.5 07/19/2019     07/19/2019    MPV 7.4 07/19/2019     WBC:    Lab Results   Component Value Date    WBC 11.8 07/19/2019     CMP:    Lab Results   Component Value Date     07/19/2019    K 5.5 07/19/2019    CL 87 07/19/2019    CO2 25 07/19/2019    BUN 65 07/19/2019    CREATININE 9.3 07/19/2019    GFRAA 5 07/19/2019    GFRAA 26 08/02/2011    AGRATIO 1.3 08/14/2017    LABGLOM 4 07/19/2019    GLUCOSE 220 07/19/2019    PROT 7.3 08/14/2017    LABALBU 3.1 07/19/2019    CALCIUM 9.5 07/19/2019    BILITOT 0.3 08/14/2017    ALKPHOS 102 08/14/2017    AST 13 08/14/2017    ALT 12 08/14/2017     BUN/Creatinine:    Lab Results   Component Value Date    BUN 65 07/19/2019    CREATININE 9.3 07/19/2019     HgBA1c:    Lab Results   Component Value Date    LABA1C 7.2 07/17/2019     General: Patient is alert and oriented x3. No signs of distress noted. LOWER EXTREMITY EXAMINATION    Dressing to left LE intact. No strikethrough noted to the external dressing. No drainage noted to the internal layers of the dressing. Vascular: DP/PT pulses palpable on the right, faintly palpable on the left 2/2 edema, biphasic on doppler examination. CRF within normal limits b/l. 2+ pitting edema to the left anterior shin, 2+ pitting edema on the dorsum of the left foot. Skin temp warm to cool from proximal to distal within normal limits, left>right. No varicosities present.     Neurological: Gross light touch sensation absent at all pedal sites, b/l.     Dermatologic: Skin is shiny with decreased turgor, absent hair growth. Nails thickened, elongated, and discolored with subungual debris 1-5 b/l. Hyperkeratotic tissue noted to the distal aspect of the left 4th digit and plantar 1st metatarsal head. Erythema noted to the left lateral lower extremity and ankle.  Dermatologic changes noted to the left lower extremity.            - Full thickness ulceration to the left plantar heel with 50% fibrotic tissue and 50%

## 2019-07-21 NOTE — PROGRESS NOTES
Occupational Therapy  Daily Treatment Note  Patient Name: Keesha Dias  MRN: [id number]    Assessment: Pt continues to be limited by pain but tolerated transitioning to EOB to sit for 10 minutes and participate in exercises. Due to body habitus and generalized weakness, she is unable to stand or transfer while maintaining NWB LLE. As a result, many ADLs are difficult as well. Divine Chamberlain is currently recommended for transfers. Pt will require continued inpt OT/PT at d/c to increase independence. Discharge Recommendations: Keesha Dias scored a 15/24 on the AM-PAC ADL Inpatient form. Current research shows that an AM-PAC score of 17 or less is typically not associated with a discharge to the patient's home setting. Based on the patients AM-PAC score and their current ADL deficits, it is recommended that the patient have 3-5 sessions per week of Occupational Therapy at d/c to increase the patients independence. Equipment Needs:  No    Chart Reviewed: Yes     Other position/activity restrictions: up with assist, NWB L (per podiatry notes)   Additional Pertinent Hx: 60 yo admitted 7/16/19 for L foot OM/full thickness heel wound. Vascular and podiatry consults; L foot XR positive OM 4th toe. Pmhx: DM with peripheral neuropathy, HTN, mulitple L foot I and D with 2nd toe amputation, ESRD on dialysis, obesity. Diagnosis: Pt admitted with L foot diabetic ulcer with osteomyelitis 4ht distal phalanx, L LE cellulitis, XR L fibula=neg fx  Treatment Diagnosis: impaired ADLs/mobility secondary to L foot osteomyelitis    Subjective: Pt in bed on entry sleeping lightly. Reports 9/10 pain at rest but that it is \"not excruciating. \" Pleased that she was able to sit bedside today. \"It's a little better. \"     Pain: 9/10 L knee pain, RN aware    Objective:    Cognition/Orientation: WFL     Bed mobility   Rolling: mod assist, mod cues (with rails)  Supine to sit: min assist, mod cues (HOB raised, effortful, increased time with LLE supported throughout)  Sit to Supine: Mod assist, min cues  Scooting: CGA (effortful at bedside); max assist x2 (in supine)  Sitting: Independent (EOB x10 min; required LLE to be slowly lowered to the floor initially)  Other: Pt will require monty lift for transfers at current level    ADLs   Grooming: offered, declined    UE Exercises   Elbow flex/ext: 20 reps  Shoulder flex: 10 reps  Row w/ scap retraction: 10 reps  Push ups/scooting: EOB x 8 reps    Activity Tolerance: Fair    Patient Education: Activity promotion, exercises - verb understanding    Safety Devices in Place: left in bed, alarm activated, needs in reach, prevalon boot to R foot, pillow under L, monty recommended for transfers    Goals:  Short term goals  Time Frame for Short term goals: discharge  Short term goal 1: pt to tolerate bed mobility asssessment - Met 7/21  Short term goal 2: pt to do grooming/hygiene tasks indep after set up - Not addressed  Short term goal 3: pt to tolerate 10 reps B UE AROM exerc to increase activity tolerance - Met 7/21 (continue to progress)  Short term goal 4: Complete rolling and scooting in bed with min assist - Not met         Plan:      Times per week: 2-5   Times per day: Daily    If patient is discharged prior to next treatment, this note will serve as the discharge summary.     Therapy Time   Individual Concurrent Group Co-treatment   Time In 1225         Time Out 1304         Minutes 39           Timed Code Treatment Minutes: 39  Total Treatment Time: 5895 Dexter Olson, OT

## 2019-07-21 NOTE — DISCHARGE INSTR - COC
with sterile saline.  - Pat the Left heel dry with sterile gauze. - Please apply Santyl ointment about a nickel size to the base of the left heel wound. - Please apply sterile Saline lightly soaked gauze to the left heel wound. - Apply dry sterile gauze over Saline lightly soaked gauze. - Apply dry sterile gauze to the front of the ankle  - Wrap kerlix from toes to knee  - Wrap 4 inch ACE bandage from toes to ankle. - Wrap 6 inch ACE bandage from ankle to knee. - Please use multiple ACE bandage as needed. Please change dressing daily. Please follow up with Dr. Duogie Celaya DPM within 1 week of discharge. Physician Certification: I certify the above information and transfer of Vivian Menjivar  is necessary for the continuing treatment of the diagnosis listed and that she requires [facility] for greater 30 days.      Update Admission H&P: No change in H&P    PHYSICIAN SIGNATURE:  Electronically signed by Bowen Mehta DO on 7/24/19 at 8:44 AM

## 2019-07-21 NOTE — PROGRESS NOTES
Lab Results   Component Value Date    WBC 11.8 07/19/2019     CMP:    Lab Results   Component Value Date     07/21/2019    K 5.0 07/21/2019    CL 91 07/21/2019    CO2 24 07/21/2019    BUN 66 07/21/2019    CREATININE 8.3 07/21/2019    GFRAA 6 07/21/2019    GFRAA 26 08/02/2011    AGRATIO 1.3 08/14/2017    LABGLOM 5 07/21/2019    GLUCOSE 178 07/21/2019    PROT 7.3 08/14/2017    LABALBU 2.9 07/21/2019    CALCIUM 9.3 07/21/2019    BILITOT 0.3 08/14/2017    ALKPHOS 102 08/14/2017    AST 13 08/14/2017    ALT 12 08/14/2017     BUN/Creatinine:    Lab Results   Component Value Date    BUN 66 07/21/2019    CREATININE 8.3 07/21/2019     HgBA1c:    Lab Results   Component Value Date    LABA1C 7.2 07/17/2019     General: Patient is alert and oriented x3. No signs of distress noted. LOWER EXTREMITY EXAMINATION    Dressing to left LE intact. No strikethrough noted to the external dressing. No drainage noted to the internal layers of the dressing. Left wound exposed and not covered with dressing. Vascular: DP/PT pulses palpable on the right, faintly palpable on the left 2/2 edema, biphasic on doppler examination. CRF within normal limits b/l. 2+ pitting edema to the left anterior shin, 2+ pitting edema on the dorsum of the left foot. Skin temp warm to cool from proximal to distal within normal limits, left>right. No varicosities present.     Neurological: Gross light touch sensation absent at all pedal sites, b/l.     Dermatologic: Skin is shiny with decreased turgor, absent hair growth. Nails thickened, elongated, and discolored with subungual debris 1-5 b/l. Hyperkeratotic tissue noted to the distal aspect of the left 4th digit and plantar 1st metatarsal head. Erythema noted to the left lateral lower extremity and ankle.  Dermatologic changes noted to the left lower extremity.     - left lower extremity         - Full thickness ulceration to the left plantar heel with 50% fibrotic tissue and 50% granular base with a

## 2019-07-21 NOTE — PROGRESS NOTES
kg)   SpO2 97%   BMI 46.99 kg/m²     Physical Exam:  General appearance:  Appears comfortable. Well nourished. Obese. Eyes: Sclera clear, pupils equal, round, and reactive to light. ENT: Moist mucus membranes, no thrush. Trachea midline. Cardiovascular: Regular rhythm, normal S1, S2. No murmur, gallop, rub. No edema in lower extremities. Respiratory: Clear to auscultation bilaterally, no wheeze, good inspiratory effort. Gastrointestinal: Abdomen soft, non-tender, not distended, normal bowel sounds. Musculoskeletal:  Left leg with ACE wrap around knee and lower leg. No obvious surrounding erythema or drainage. Neurology: Cranial nerves grossly intact. Alert and oriented in time, place and person. No speech or motor deficits. Psychiatry: Appropriate affect. Not agitated. Skin: Warm, dry, normal turgor, no rash. LABS:    CBC:   Recent Labs     07/19/19  0811 07/20/19  0545   WBC 11.8*  --    HGB 9.2* 8.4*   HCT 27.5* 24.9*   MCV 99.8  --      --                                                                 BMP:    Recent Labs     07/19/19  0811 07/21/19  0524   * 137   K 5.5* 5.0   CL 87* 91*   CO2 25 24   BUN 65* 66*   CREATININE 9.3* 8.3*   GLUCOSE 220* 178*       -----------------------------------------------------------------  RAD:   CT FOOT LEFT WO CONTRAST   Final Result      Amputation of the second digit at the metatarsophalangeal joint      Cortical erosion involving the second metatarsal head, which may be related to clinically suspected osteomyelitis. Suspected fibroids infraction involving the third metatarsal head      Recommend MRI foot without with IV contrast for further evaluation of the above findings, as clinically indicated      CT TIBIA FIBULA LEFT WO CONTRAST   Final Result      Large subcutaneous fluid collection abutting the anterolateral muscle group with differential diagnosis including chronic hematoma, large seroma or abscess.       Large suprapatellar joint effusion      High-grade tricompartmental degenerative osteoarthritis of the knee      NM INFLAMMATORY WBC LIMITED W CERETEC   Final Result      Probable abscess in the lateral aspect of the left lower leg. Focal uptake in the plantar surface of the left heel most suggestive of osteomyelitis. VASCULAR REPORT   Final Result      XR KNEE LEFT (1-2 VIEWS)   Final Result      Marked degenerative change lateral compartment and patellofemoral compartment. Large joint effusion. No fracture. If concern for acute injury splinting with follow-up recommended. Examination limited due to underpenetration. LASER SKIN PERFUSION PRESSURE STUDY   Final Result      XR TIBIA FIBULA LEFT (2 VIEWS)   Final Result   Impression: No acute fracture or bony destruction. No discrete soft tissue abnormality. Degenerative changes of the knee, as above. XR FOOT LEFT (MIN 3 VIEWS)   Final Result      Acute osteomyelitis in the 4th distal phalanx. Assessment/Plan: Charmayne Agee is a 61 y.o. female, who was admitted with DFI, found to have osteomyelitis.     Diabetic foot infection:   Full thickness ulceration of L plantar heel 2/2 pressure. Hematoma left leg from fall. Osteomyelitis, left 4th digit. Refused MRI. NM study showed probable abscess in the left lower leg and focal uptake in the heel suggestive of osteomyelitis. - continue vanc/zosyn   -Podiatry following  -ID following    Septic knee, left with left leg abscess  S/p I&D on 7/19 by Dr. Génesis Vivar. - continue vanc/zosyn pending cultures  -dilaudid for pain control due to ESRD  - PT/OT  - will need placement on discharge    ESRD on HD (MWF)  -dialysis this morning  -continue low K diet after surgery     HTN  Continue home amlodipine     DMII  BG trending up to mid 200s today.    - Home lantus 30 units nightly for now (pt reported BID use at home)  - Increased to high dose sliding scale today         ============================================================================  Code status: Full Code  FEN: Diet NPO, After Midnight  DIET CARB CONTROL; Low Potassium  PPx: Heparin Subq  Case management: Consulted for home care needs    This patient will be staffed with Talia Mirza MD.         Bonnie Sigala MD  Internal Medicine   7/21/2019  12:38 PM

## 2019-07-21 NOTE — PROGRESS NOTES
Physical Therapy    Daily Treatment Note    Assessment:  Pt with good effort/participation despite increased pain. Tolerated EOB activity well. Standing/ambulation limited by NWB status. If OOB is desired, will need maxi move to keep NWB. Safety concerns for pt returning home upon d/c and would benefit from ongoing skilled PT to maximize functional independence. Discharge Recommendations: Taylor Paz scored a 8/24 on the AM-PAC short mobility form. Current research shows that an AM-PAC score of 17 or less is typically not associated with a discharge to the patient's home setting. Based on the patients AM-PAC score and their current functional mobility deficits, it is recommended that the patient have 3-5 sessions per week of Physical Therapy at d/c to increase the patients independence. Equipment Needs:  Defer    Chart Reviewed: Yes   Other position/activity restrictions: Up with assist, NWB L (per podiatry notes)   Additional Pertinent Hx: 62 yo admitted 7/16/19 for L foot OM/full thickness heel wound. Vascular and podiatry consults; L foot XR positive OM 4th toe. Pt to OR 7/19 s/p L knee arthotomy I&D. Pmhx: DM with peripheral neuropathy, HTN, mulitple L foot I and D with 2nd toe amputation, ESRD on dialysis, obesity. Diagnosis: L foot OM/full thickness heel wound   Treatment Diagnosis: mobility impairment due to L knee pain      Subjective:  Pt found supine in bed. \"I will try. \"    Pain:  9/10 L knee pain, RN aware. Pt resting comfortably in bed and asleep on entry to room. Objective:    Bed mobility  Rolling:  Right <> Left with Mod A (increased cues for sequencing and to reach across body for bed rails)  Supine to sit:  Min A (HOB raised, assist for L LE, use of rail, max effort and increased time. Pt needing brief rest breaks mid task)  Sit to Supine:   Mod A (bed flat, unable to lift LLE up into bed)    Transfers  Sit to stand:  Unable to assess  Bed <> chair:  Unable to

## 2019-07-21 NOTE — CONSULTS
Clinical Pharmacy Consult Note    ADMIT DATE: 7/16/2019     62 yo F with PMHx significant for ESRD on HD, HTN, DM2 who presented from wound care clinic with painful L ankle/shin and L heel ulcer. Current problems include diabetic foot infection/leg abscess with osteomyelitis and possible septic arthritis and ESRD on HD (MWF schedule). Patient is currently on Zosyn & vancomycin. Pharmacy consulted to dose vancomycin per Dr. Juan Thompson. PERTINENT MEDICATIONS:  Zosyn 3.375g IV q12h (4-hr inf) -- day #6  Vancomycin -- pharmacy to dose -- day #3  Date Dose Vanc Level   7/16 2g IV    7/17 7/18 7/19 1g IV    7/20 7/21  10.6 mcg/mL          LABORATORY:  Recent Labs     07/19/19  0811 07/21/19  0524   * 137   K 5.5* 5.0   CL 87* 91*   CO2 25 24   BUN 65* 66*   CREATININE 9.3* 8.3*   GLUCOSE 220* 178*     Recent Labs     07/19/19  0811 07/20/19  0545   WBC 11.8*  --    HGB 9.2* 8.4*     --      MICROBIOLOGY:      VITALS:  BP (!) 165/79   Pulse 76   Temp 98.1 °F (36.7 °C) (Oral)   Resp 16   Ht 5' 7\" (1.702 m)   Wt (!) 300 lb 0.7 oz (136.1 kg)   SpO2 97%   BMI 46.99 kg/m²     Intake/Output Summary (Last 24 hours) at 7/21/2019 1143  Last data filed at 7/21/2019 0902  Gross per 24 hour   Intake 1090 ml   Output 0 ml   Net 1090 ml       PROPHYLAXIS:  Stress ulcer: famotidine 20 mg daily  VTE:  Heparin 5,000 units TID    ASSESSMENT/PLAN:  1)  L foot infection, L knee septic arthritis: Zosyn, Day #6 + Vancomycin, Day #3  · Vancomycin--pharmacy to dose  · Pt was scheduled on vancomycin 1g IV on MWF with HD. Received vancomycin 1g with HD on Fri 7/19. · Will dose vancomycin based on intermittent levels given ESRD on HD. Have d/c'd scheduled vancomycin for now and entered placeholder onto [facility] · Vancomycin level today = 10.6 mcg/mL, which is less than goal of 15-20 mcg/mL given osteomyelitis and septic arthritis.   · Will given vancomycin 1.25g IV today (~13.5 mg/kg based on adjusted body weight of 91kg).  · Repeat vancomycin level in AM on Mon 7/22 (next HD session). Plan to eventually schedule vancomycin with HD. · Clinical status will be monitored and repeat doses/levels will be ordered as appropriate. Thank you for the consult! Please call with any questions.   Bola Saucedo, PharmD, BCPS  Wireless: [phone]  or [phone]  7/21/2019 11:43 AM

## 2019-07-22 NOTE — PROGRESS NOTES
Knee dressing changed, wet to dry. Minimal serosanguinous drainage with new dressing change. Steri strips still on incision.

## 2019-07-22 NOTE — PROGRESS NOTES
153 07/22/2019       IMPRESSION/RECOMMENDATIONS:      1. ESRD on MWF at [address], HD in progress UF goal ~3.5L 2 K bath given k level today  2. Hyperkalemia chronic issue 2k bath today ensure 2k bath    3. Septic knee and abscess further ABx per ID. No vanc needed today given level. 4. No PICC please   5. HTN monitor on home meds bp higher today see how responds to UF  6. Anemia supplement aranesp 60 mcg today, hb lower possibly dilutional, fe relatively contraindicated     Thank you for allowing me to participate in the care of this patient. I will continue to follow along. Please call with questions.     Mindi Wolfe MD

## 2019-07-22 NOTE — PROGRESS NOTES
CONTRAST   Final Result      Large subcutaneous fluid collection abutting the anterolateral muscle group with differential diagnosis including chronic hematoma, large seroma or abscess. Large suprapatellar joint effusion      High-grade tricompartmental degenerative osteoarthritis of the knee      NM INFLAMMATORY WBC LIMITED W CERETEC   Final Result      Probable abscess in the lateral aspect of the left lower leg. Focal uptake in the plantar surface of the left heel most suggestive of osteomyelitis. VASCULAR REPORT   Final Result      XR KNEE LEFT (1-2 VIEWS)   Final Result      Marked degenerative change lateral compartment and patellofemoral compartment. Large joint effusion. No fracture. If concern for acute injury splinting with follow-up recommended. Examination limited due to underpenetration. LASER SKIN PERFUSION PRESSURE STUDY   Final Result      XR TIBIA FIBULA LEFT (2 VIEWS)   Final Result   Impression: No acute fracture or bony destruction. No discrete soft tissue abnormality. Degenerative changes of the knee, as above. XR FOOT LEFT (MIN 3 VIEWS)   Final Result      Acute osteomyelitis in the 4th distal phalanx. Medications: All current and past medications were reviewed.      darbepoetin jay-polysorbate  60 mcg Intravenous Weekly - Monday    vancomycin (VANCOCIN) intermittent dosing (placeholder)   Other RX Placeholder    insulin lispro  0-18 Units Subcutaneous TID     insulin lispro  0-9 Units Subcutaneous Nightly    mupirocin   Nasal BID    docusate sodium  100 mg Oral BID    famotidine  20 mg Oral Daily    sodium hypochlorite   Irrigation Daily    heparin (porcine)  2,500 Units Intravenous Q MWF    sodium chloride flush  10 mL Intravenous 2 times per day    aspirin  81 mg Oral Daily    amLODIPine  10 mg Oral Daily    pravastatin  20 mg Oral Nightly    sevelamer  3,200 mg Oral TID     piperacillin-tazobactam  3.375 g Intravenous Q12H    insulin glargine  30 Units Subcutaneous Nightly    levothyroxine  50 mcg Oral QAM AC    cyclobenzaprine  10 mg Oral Nightly    [Held by provider] heparin (porcine)  5,000 Units Subcutaneous 3 times per day        dextrose         labetalol, sevelamer, glucose, dextrose, glucagon (rDNA), dextrose, sodium chloride flush, ondansetron, bisacodyl, acetaminophen, oxyCODONE-acetaminophen      Problem list:       Patient Active Problem List   Diagnosis Code    Diabetic foot infection ([facility]) E11.628, L08.9    Chronic kidney disease with end stage renal failure on dialysis ([facility]) N18.6, Z99.2    Leukocytosis D72.829    Osteomyelitis of toe of left foot ([facility] M86.9    Cellulitis of left lower extremity L03. 80    Atherosclerosis of native artery of left lower extremity with ulceration of ankle ([facility]) I70.243    Poorly controlled type 2 diabetes mellitus ([facility] E11.65    PAD (peripheral artery disease) ([facility]) I73.9    Critical lower limb ischemia I99.8    Pain in both lower extremities M79.604, M79.605    Diabetic ulcer of left heel associated with diabetes mellitus due to underlying condition, with fat layer exposed ([facility] [address] E46.679, L97.422    Localized edema R60.0    Diabetic ulcer of left heel associated with type 2 diabetes mellitus ([facility]) E11.621, L97.429    Neuropathy G62.9    Obesity E66.9    Hematoma of lower extremity, left, initial encounter S80.12XA    Leg swelling M79.89    Hyperkalemia E87.5    Diabetic polyneuropathy associated with type 2 diabetes mellitus ([facility]) E11.42    Pyogenic arthritis of left knee joint ([facility]) M00.9    Abscess of left leg L02.416    Mass of left lower leg R22.42       Please note that this chart was generated using Dragon dictation software. Although every effort was made to ensure the accuracy of this automated transcription, some errors in transcription may have occurred inadvertently.  If you may need any clarification, please do not hesitate to

## 2019-07-22 NOTE — PROGRESS NOTES
Resident Progress Note    Admit Date: 2019    PCP: Mulugeta Ly                  : 1960  MRN: [id number]    CC: Left ankle / shin pain, heel pain, cellulitis    Subjective:     Passed vaginal blood clot, CT abdomen/pelvis was done, but shows no acute findings. Endorsed some cramping in abdomen with it which is now resolved. Says shes has had clots now again over the last few years. Leg pain is better. No new complaints, specifically no chest pain, sob abdominal pain, diarrhea, or dysuria. Data:   Scheduled Meds:   vancomycin (VANCOCIN) intermittent dosing (placeholder)   Other RX Placeholder    insulin lispro  0-18 Units Subcutaneous TID WC    insulin lispro  0-9 Units Subcutaneous Nightly    mupirocin   Nasal BID    docusate sodium  100 mg Oral BID    famotidine  20 mg Oral Daily    sodium hypochlorite   Irrigation Daily    heparin (porcine)  2,500 Units Intravenous Q MWF    sodium chloride flush  10 mL Intravenous 2 times per day    aspirin  81 mg Oral Daily    amLODIPine  10 mg Oral Daily    pravastatin  20 mg Oral Nightly    sevelamer  3,200 mg Oral TID WC    piperacillin-tazobactam  3.375 g Intravenous Q12H    insulin glargine  30 Units Subcutaneous Nightly    levothyroxine  50 mcg Oral QAM AC    cyclobenzaprine  10 mg Oral Nightly    [Held by provider] heparin (porcine)  5,000 Units Subcutaneous 3 times per day     Continuous Infusions:   sodium chloride 75 mL/hr at 19 2110    dextrose       PRN Meds:labetalol, sevelamer, glucose, dextrose, glucagon (rDNA), dextrose, sodium chloride flush, ondansetron, bisacodyl, acetaminophen, oxyCODONE-acetaminophen  I/O last 3 completed shifts: In: 1200 [P.O.:1200]  Out: 0   No intake/output data recorded.     Intake/Output Summary (Last 24 hours) at 2019 1004  Last data filed at 2019 0619  Gross per 24 hour   Intake 960 ml   Output 0 ml   Net 960 ml           Objective:     Vitals: BP (!) 161/67   Pulse 76 Temp 97.9 °F (36.6 °C)   Resp 16   Ht 5' 7\" (1.702 m)   Wt (!) 300 lb 0.7 oz (136.1 kg)   SpO2 95%   BMI 46.99 kg/m²     Physical Exam:  General appearance:  Appears comfortable. Well nourished. Obese. Eyes: Sclera clear, pupils equal, round, and reactive to light. ENT: Moist mucus membranes, no thrush. Trachea midline. Cardiovascular: Regular rhythm, normal S1, S2. No murmur, gallop, rub. No edema in lower extremities. Respiratory: Clear to auscultation bilaterally, no wheeze, good inspiratory effort. Gastrointestinal: Abdomen soft, non-tender, not distended, normal bowel sounds. Musculoskeletal:  Left leg with ACE wrap around knee and lower leg. No obvious surrounding erythema or drainage. Neurology: Cranial nerves grossly intact. Alert and oriented in time, place and person. No speech or motor deficits. Psychiatry: Appropriate affect. Not agitated. Skin: Warm, dry, normal turgor, no rash. LABS:    CBC:   Recent Labs     07/20/19  0545 07/21/19  2114 07/22/19  0541   WBC  --  14.1*  --    HGB 8.4* 7.8* 7.9*   HCT 24.9* 23.4* 23.2*   MCV  --  101.6*  --    PLT  --  427  --                                                                 BMP:    Recent Labs     07/21/19  0524 07/22/19  0541    136   K 5.0 5.8*   CL 91* 92*   CO2 24 24   BUN 66* 83*   CREATININE 8.3* 10.6*   GLUCOSE 178* 153*       -----------------------------------------------------------------  RAD:   CT ABDOMEN PELVIS WO CONTRAST Additional Contrast? None   Final Result      Cholelithiasis. Large anterior midline, lower abdominal wall hernia, containing fat of the bowel, including mid transverse colon and small bowel. There is no evidence of obstruction. No other acute findings in the abdomen or pelvis.             CT FOOT LEFT WO CONTRAST   Final Result      Amputation of the second digit at the metatarsophalangeal joint      Cortical erosion involving the second metatarsal head, which may be related to clinically on 7/19 by Dr. Sheri Lemus. - continue vanc/zosyn pending cultures  -dilaudid for pain control due to ESRD  - PT/OT  - will need placement on discharge, CM following     ESRD on HD (MWF)  -continue low K diet      HTN  Continue home amlodipine     DMII  BG trending up to mid 200s today.    - Home lantus 30 units nightly for now (pt reported BID use at home)  - sliding    Vaginal bleeding  -will need outpatient Gyn work-up, discussed with patient    ============================================================================  Code status: Full Code  FEN: Diet NPO, After Midnight  DIET CARB CONTROL; Low Potassium  PPx: Heparin Subq  Case management: Consulted for home care needs    This patient will be staffed with Rosaura Tesfaye MD.     Luz Fabian DO, PGY-3  Internal Medicine   7/22/2019  10:04 AM

## 2019-07-22 NOTE — PROGRESS NOTES
Podiatric Surgery Daily Progress Note      Admit Date: 7/16/2019                                      Code:Full Code    Patient seen and examined, labs and records reviewed    Subjective:     Patient seen at bedside this afternoon after her dialysis. Patient denies any overnight acute event. Patient  was in the room. Patient states her left knee is still painful and hurts with any movement. Patient denies f/c/n/v/sob/cp. Patient denies calf tenderness. Patient has no pedal complaints at this visit. Review of Systems: A 12 point review of symptoms is unremarkable with the exception of the chief complaint. Patient specifically denies nausea, fever, vomiting, chills, shortness of breath, chest pain, abdominal pain, constipation or difficulty urinating. Objective     BP (!) 162/63   Pulse 80   Temp 97.9 °F (36.6 °C) (Oral)   Resp 16   Ht 5' 7\" (1.702 m)   Wt 294 lb 8.6 oz (133.6 kg)   SpO2 98%   BMI 46.13 kg/m²      I/O:    Intake/Output Summary (Last 24 hours) at 7/22/2019 1325  Last data filed at 7/22/2019 1200  Gross per 24 hour   Intake 1360 ml   Output 4000 ml   Net -2640 ml              Wt Readings from Last 3 Encounters:   07/22/19 294 lb 8.6 oz (133.6 kg)   07/12/19 (!) 301 lb (136.5 kg)   06/17/19 (!) 301 lb 5.9 oz (136.7 kg)       LABS:    Recent Labs     07/21/19  2114 07/22/19  0541   WBC 14.1*  --    HGB 7.8* 7.9*   HCT 23.4* 23.2*     --      Lab Results   Component Value Date    LABA1C 7.2 07/17/2019     Lab Results   Component Value Date    .9 07/17/2019          Recent Labs     07/22/19  0541      K 5.8*   CL 92*   CO2 24   PHOS 10.9*   BUN 83*   CREATININE 10.6*      No results for input(s): PROT, INR, APTT in the last 72 hours.     CBC:   Lab Results   Component Value Date    WBC 14.1 07/21/2019    RBC 2.30 07/21/2019    HGB 7.9 07/22/2019    HCT 23.2 07/22/2019    .6 07/21/2019    MCH 33.8 07/21/2019    MCHC 33.3 07/21/2019    RDW 14.1 07/21/2019 probe to bone. No malodor noted. Wound measures approximately 3.4 cm x 2.6 x 0.4 cm.        Musculoskeletal: Muscle strength is deferred on the left 2/2 knee pain, 4/5 on the right. Ankle ROM decreased on left compared to right. Previous left 2nd digit amputation 2/2 osteomyelitis. IMAGING:    Component Collected Lab   Body Fluid Culture, Sterile 07/19/2019  3:14 PM [facility]   No growth to date   No growth 36-48 hours   Further report to follow    Gram Stain Result 07/19/2019  3:14  [facility]   No WBCs or organisms seen        Narrative   CT left foot without IV contrast       HISTORY: Pain and swelling; osteomyelitis       Scans from distal tibia/fibula through the foot and ankle. Images reconstructed in sagittal and coronal planes. Up-to-date CT equipment with radiation dose reduction techniques       Correlation with conventional radiographs 7/16/2019       Amputation of the second digit at the metatarsophalangeal joint. . Subtle erosion and loss of cortical margin involving the plantar aspect of the second metatarsal head, series 603 images 48-51, and can be associated with clinically suspected    osteomyelitis.       There is flattening and mild sclerosis involving the third metatarsal head. Subchondral cyst formation is also noted. The findings could be associated with osteonecrosis (Freiberg's infraction). Degenerative osteoarthritis is also included in the    differential diagnosis       Other metatarsals unremarkable.  Mid foot tarsal bones intact.           Impression       Amputation of the second digit at the metatarsophalangeal joint       Cortical erosion involving the second metatarsal head, which may be related to clinically suspected osteomyelitis.       Suspected fibroids infraction involving the third metatarsal head       Recommend MRI foot without with IV contrast for further evaluation of the above findings, as clinically indicated         Narrative

## 2019-07-22 NOTE — PROGRESS NOTES
Clinical Pharmacy Consult Note    ADMIT DATE: 7/16/2019     60 yo F with PMHx significant for ESRD on HD, HTN, DM2 who presented from wound care clinic with painful L ankle/shin and L heel ulcer. Current problems include diabetic foot infection/leg abscess with osteomyelitis and possible septic arthritis and ESRD on HD (MWF schedule). Patient is currently on Zosyn & vancomycin. Pharmacy consulted to dose vancomycin per Dr. Calvin Tovar. PERTINENT MEDICATIONS:  Zosyn 3.375g IV q12h (4-hr inf) -- day #7  Vancomycin -- pharmacy to dose -- day #4  Date Dose Vanc Level   7/16 2g IV    7/17 --    7/18 --    7/19 1g IV    7/20 --    7/21 1.25g IV 10.6 mcg/mL   7/22 -- 22.9 mcg/mL     LABORATORY:  Recent Labs     07/21/19  0524 07/22/19  0541    136   K 5.0 5.8*   CL 91* 92*   CO2 24 24   BUN 66* 83*   CREATININE 8.3* 10.6*   GLUCOSE 178* 153*     Recent Labs     07/21/19  2114 07/22/19  0541   WBC 14.1*  --    HGB 7.8* 7.9*     --      MICROBIOLOGY:      VITALS:  BP (!) 155/77   Pulse 74   Temp 97.7 °F (36.5 °C) (Oral)   Resp 16   Ht 5' 7\" (1.702 m)   Wt (!) 300 lb 0.7 oz (136.1 kg)   SpO2 95%   BMI 46.99 kg/m²     Intake/Output Summary (Last 24 hours) at 7/22/2019 0915  Last data filed at 7/22/2019 4914  Gross per 24 hour   Intake 960 ml   Output 0 ml   Net 960 ml       PROPHYLAXIS:  Stress ulcer: famotidine 20 mg daily  VTE:  Heparin 5,000 units TID    ASSESSMENT/PLAN:  1)  L foot infection, L knee septic arthritis: Zosyn, Day #7 + Vancomycin, Day #4  · Vancomycin--pharmacy to dose  · Will dose vancomycin based on intermittent levels given ESRD on HD. Random level today = 22.9 mcg/mL. Patient scheduled for dialysis today, will order random vanc level for tomorrow AM. Plan to eventually schedule vancomycin doses with dialysis. · Clinical status will be monitored and repeat doses/levels will be ordered as appropriate. Thank you for the consult! Please call with any questions.   Paulette Yee

## 2019-07-22 NOTE — PROGRESS NOTES
RN notified of Critical lab values, BUN: 83, Creatinine 10.6, Random Vanc: 22.9. MD notified and aware.

## 2019-07-22 NOTE — PROGRESS NOTES
evidence of obstruction. No other acute findings in the abdomen or pelvis. CT FOOT LEFT WO CONTRAST   Final Result      Amputation of the second digit at the metatarsophalangeal joint      Cortical erosion involving the second metatarsal head, which may be related to clinically suspected osteomyelitis. Suspected fibroids infraction involving the third metatarsal head      Recommend MRI foot without with IV contrast for further evaluation of the above findings, as clinically indicated      CT TIBIA FIBULA LEFT WO CONTRAST   Final Result      Large subcutaneous fluid collection abutting the anterolateral muscle group with differential diagnosis including chronic hematoma, large seroma or abscess. Large suprapatellar joint effusion      High-grade tricompartmental degenerative osteoarthritis of the knee      NM INFLAMMATORY WBC LIMITED W CERETEC   Final Result      Probable abscess in the lateral aspect of the left lower leg. Focal uptake in the plantar surface of the left heel most suggestive of osteomyelitis. VASCULAR REPORT   Final Result      XR KNEE LEFT (1-2 VIEWS)   Final Result      Marked degenerative change lateral compartment and patellofemoral compartment. Large joint effusion. No fracture. If concern for acute injury splinting with follow-up recommended. Examination limited due to underpenetration. LASER SKIN PERFUSION PRESSURE STUDY   Final Result      XR TIBIA FIBULA LEFT (2 VIEWS)   Final Result   Impression: No acute fracture or bony destruction. No discrete soft tissue abnormality. Degenerative changes of the knee, as above. XR FOOT LEFT (MIN 3 VIEWS)   Final Result      Acute osteomyelitis in the 4th distal phalanx. Assessment/Plan:    Knee Pain  S/p I&D of left knee/left calf.  Still in Pain today Being followed by Ortho and Podiatry.   -Dilaudid for pain control  -Continue Incentive Spirometry      Vaginal

## 2019-07-22 NOTE — PLAN OF CARE
Nutrition Problem: Increased nutrient needs  Intervention: Food and/or Nutrient Delivery: Modify current diet, Modify current ONS  Nutritional Goals: pt will tolerate diet adv w/ intakes of 50% or more of all meals offered to meet increased needs

## 2019-07-22 NOTE — PROGRESS NOTES
Patient has passed large blood clot from vagina, blood clot measures about 8cm long and 2.5cm at widest point. BP elevated, otherwise all other VSS, patient denies dizziness or lightheadedness. MD notified, waiting for response back.

## 2019-07-22 NOTE — PLAN OF CARE
Problem: Falls - Risk of:  Goal: Will remain free from falls  Description  Fall precautions in place. Bed is in lowest position, wheels locked, alarm on, non-skid socks on. Call light and bedside table within reach. Patient calls out appropriately. Patient is up x max assist. Will continue to assess and monitor. 7/22/2019 1101 by Aung Gentile RN  Outcome: Ongoing         Problem: Pain:  Goal: Pain level will decrease  Description  Pain level will decrease. Will assess and treat pain adequately.   7/22/2019 1101 by Aung Gentile RN  Outcome: Ongoing

## 2019-07-23 NOTE — PROGRESS NOTES
of 15 mcg/mL prior to re-dosing. Plan to eventually schedule vancomycin doses with dialysis. Next level will be due Friday 7/26. · Clinical status will be monitored and repeat doses/levels will be ordered as appropriate. Thank you for the consult! Please call with any questions.   Crystal Hathaway PharmD  [phone]  7/23/2019 9:11 AM

## 2019-07-23 NOTE — PROGRESS NOTES
get rid of L LE pain       Therapy Time   Individual Concurrent Group Co-treatment   Time In 0845         Time Out 0915         Minutes 30         Timed Code Treatment Minutes: 30 Minutes       If patient is discharged prior to next treatment session, this note will serve as the discharge summary.   Bennett Yo, OTR/L #[id number]

## 2019-07-23 NOTE — PROGRESS NOTES
Physical Therapy  Facility/Department: [facility]  Daily Treatment Note  NAME: Lorie Fierro  : 1960  MRN: [id number]    Date of Service: 2019    Discharge Recommendations: Taylor Paz scored a 9/24 on the AM-PAC short mobility form. Current research shows that an AM-PAC score of 17 or less is typically not associated with a discharge to the patient's home setting. Based on the patients AM-PAC score and their current functional mobility deficits, it is recommended that the patient have 3-5 sessions per week of Physical Therapy at d/c to increase the patients independence. Assessment   Assessment: Pt was limited due to NWB LE and generalized weakness. Presented with poor trunk and LE control during bed mobility with the HOB elevated and use of the handrails to get EOB. Requiring assistance and sequencing cues throughout. Pt struggled maintaining LE NWB during transfers; with cues pt was able to maintain NWB when standing inside [facility]. POSITION LLE OUTSIDE OF [facility] DURING NEXT SESSION TO ASSIST WITH MANAGING LLE NWB AND PAIN. Pt c/o extreme knee pain with increased flexion. Treatment Diagnosis: mobility impairment due to L knee pain  Decision Making: High Complexity  Patient Education: Pt educated on PT role, need to call for assist to get up, NWB L and she verbalized understanding. REQUIRES PT FOLLOW UP: Yes     Patient Diagnosis(es): The primary encounter diagnosis was Diabetic ulcer of left heel associated with diabetes mellitus due to underlying condition, with fat layer exposed ([address] A diagnosis of Cellulitis of left lower extremity was also pertinent to this visit. has a past medical history of Arthritis, Diabetes mellitus ([address] ESRD (end stage renal disease) ([address] Hemodialysis patient ([facility] [address] Hyperlipidemia, Hypertension, MRSA nasal colonization, and Thyroid disease.    has a past surgical history that includes Foot surgery (Left, 08/15/2017); other surgical history (Left,

## 2019-07-23 NOTE — PROGRESS NOTES
Department of Orthopedic Surgery  Nurse Practitioner   Progress Note    Subjective:     POD #3 I&D left leg deep abscess, left septic knee arthrotomy  Systemic or Specific Complaints: Patient sitting up in bed. Pain well controlled and pt reports left knee feels much improved. No family at bedside. Objective:     Patient Vitals for the past 24 hrs:   BP Temp Temp src Pulse Resp SpO2 Weight   07/23/19 0730 (!) 170/73 97.7 °F (36.5 °C) Oral 70 16 96 % --   07/23/19 0343 (!) 147/61 98 °F (36.7 °C) Oral 72 15 96 % --   07/22/19 2308 (!) 153/81 97.9 °F (36.6 °C) Oral 72 15 95 % --   07/22/19 2115 (!) 164/75 -- -- -- -- -- --   07/22/19 1930 (!) 183/84 98 °F (36.7 °C) Oral 81 16 93 % --   07/22/19 1549 (!) 159/69 98.3 °F (36.8 °C) Oral 81 16 94 % --   07/22/19 1309 (!) 162/63 97.9 °F (36.6 °C) Oral 80 16 98 % --   07/22/19 1200 (!) 148/58 97.8 °F (36.6 °C) -- 75 16 -- 294 lb 8.6 oz (133.6 kg)       General: alert, appears stated age, cooperative and no distress   Wound: Wound clean and dry no evidence of infection. Motion: Painless range of Motion in affected extremity   DVT Exam: No evidence of DVT seen on physical exam.     Additional exam: NVI. Moving foot and ankle without difficulty. Changed knee dressing. Incision CDI, no drainage. No erythema, mild swelling. Data Review  CBC:   Lab Results   Component Value Date    WBC 16.3 07/23/2019    RBC 2.36 07/23/2019    HGB 8.0 07/23/2019    HCT 23.9 07/23/2019     07/23/2019       Renal:   Lab Results   Component Value Date     07/23/2019    K 4.8 07/23/2019    CL 91 07/23/2019    CO2 22 07/23/2019    BUN 48 07/23/2019    CREATININE 6.5 07/23/2019    GLUCOSE 109 07/23/2019    CALCIUM 10.0 07/23/2019            Assessment:     S/p I&D left leg deep abscess, left septic knee arthrotomy    Plan:      1: Continue current plan of care per medicine. ID following, continue IV abx per ID. Dialysis per nephro. Podiatry following.  No plans for further intervention from ortho standpoint. 2:  Continue Deep venous thrombosis prophylaxis - heparin  3:  Continue Pain Control  4:  PT, OT, NWB LLE  5:  Patient okay to discharge from ortho standpoint pending SNF placement and ID's final recs. We will follow peripherally, please contact us for any ortho concerns or needs. Discharge instructions completed. Discussed with hospitalist. CM aware and following.     Annamarie Duarte, CNP

## 2019-07-23 NOTE — PROGRESS NOTES
Resp 16   Ht 5' 7\" (1.702 m)   Wt 294 lb 8.6 oz (133.6 kg)   SpO2 96%   BMI 46.13 kg/m²     General appearance: +Obese, Appears in Pain, appears stated age and cooperative. HEENT:  Normal cephalic, atraumatic without obviousdeformity. Pupils equal, round, and reactive to light.  Extra ocular muscles intact. Conjunctivae/corneas clear. Neck: Supple, with full range of motion. No jugular venous distention. Trachea midline. Respiratory: +Wheezing, Normal respiratory effort. No Rales or Rhonchi  Cardiovascular: Regular rate and rhythm with normal S1/S2 without murmurs, rubs or gallops. Abdomen: Abdomen is ,non-distended with normal bowel sounds. Musculoskeletal: Dressing and Drain at left knee, Leg until thigh wrapped with Ace   Skin: Left Shin slightly warm, Tender to palpation, Foot Ulcer on the heel of the right foot  Neurologic:  Neurovascularly intact without any focal sensory/motor deficits. Cranial nerves: II-XII intact, grosslynon-focal.  Psychiatric:  Alert and oriented, thought content appropriate, normal insight  Capillary Refill: Brisk,< 3 seconds   Peripheral Pulses: +2 palpable, equal bilaterally       Labs:   Recent Labs     07/21/19  2114 07/22/19  0541   WBC 14.1*  --    HGB 7.8* 7.9*   HCT 23.4* 23.2*     --      Recent Labs     07/21/19  0524 07/22/19  0541 07/23/19  0628    136 135*   K 5.0 5.8* 4.8   CL 91* 92* 91*   CO2 24 24 22   BUN 66* 83* 48*   CREATININE 8.3* 10.6* 6.5*   CALCIUM 9.3 9.8 10.0   PHOS 9.1* 10.9* 7.3*     No results for input(s): AST, ALT, BILIDIR, BILITOT, ALKPHOS in the last 72 hours. No results for input(s): INR in the last 72 hours. No results for input(s): Ethlyn Krystal in the last 72 hours. Urinalysis:    No results found for: Kathlean Gift, BACTERIA, RBCUA, BLOODU, SPECGRAV, Cyn[id number]    Radiology:  CT ABDOMEN PELVIS WO CONTRAST Additional Contrast? None   Final Result      Cholelithiasis.       Large anterior midline, lower abdominal wall

## 2019-07-23 NOTE — PROGRESS NOTES
Infectious Diseases   Progress Note      Admission Date: 7/16/2019  Hospital Day: Hospital Day: 8  Attending: Pattie Kauffman MD  Date of service: 7/23/2019    Chief complaint/ Reason for consult: The patient was seen today for the following:    · Complicated left diabetic foot infection - has left calcaneum osteomyelitis  · Diabetic polyneuropathy  · Poorly controlled type 2 diabetes mellitus  · Essential hypertension    Microbiology:        I have reviewed all available micro lab data and cultures    · Blood culture (2/2) - collected on 7/16/2019: Negative  · Left knee synovial fluid culture  - collected on 7/19/2019: Negative so far    Antibiotics and immunizations:       Current antibiotics: All antibiotics and their doses were reviewed by me    Recent Abx Admin                   piperacillin-tazobactam (ZOSYN) 3.375 g in dextrose 5 % 100 mL IVPB extended infusion (mini-bag) (g) 3.375 g New Bag 07/23/19 1842     3.375 g New Bag  0625                  Immunization History: All immunization history was reviewed by me today. There is no immunization history on file for this patient. Known drug allergies: All allergies were reviewed and updated    No Known Allergies      Assessment:     The patient is a 61 y.o. old female who  has a past medical history of Arthritis, Diabetes mellitus ([facility] [address] ESRD (end stage renal disease) ([facility] [address] Hemodialysis patient ([facility] [address] Hyperlipidemia, Hypertension, MRSA nasal colonization (07/17/2019), and Thyroid disease. with following problems:    · Complicated left diabetic foot infection - has left calcaneum osteomyelitis-cultures in process  · Diabetic polyneuropathy  · Poorly controlled type 2 diabetes mellitus-diabetes counseling done  · Essential hypertension-blood pressure stable  · Mixed hyperlipidemia  · ESRD on hemodialysis  · Claustrophobia  · Obesity Class 3 due to excess calorie intake : Body mass index is 47.17 kg/m².       Discussion:      She has been on IV stridor. Cardiovascular: Negative for chest pain and palpitations. Gastrointestinal: Negative for abdominal pain, blood in stool, diarrhea and nausea. Endocrine: Negative for polydipsia, polyphagia and polyuria. Genitourinary: Negative for difficulty urinating, dysuria, frequency, hematuria, menstrual problem and vaginal discharge. Musculoskeletal: Positive for arthralgias (Left calcaneum area). Negative for joint swelling, myalgias and neck stiffness. Skin: Negative for color change and rash. Allergic/Immunologic: Negative for immunocompromised state. Neurological: Negative for dizziness, seizures, speech difficulty, light-headedness and headaches. Hematological: Negative for adenopathy. Psychiatric/Behavioral: Negative for agitation, hallucinations and suicidal ideas. Past Medical History: All past medical history reviewed today. Past Medical History:   Diagnosis Date    Arthritis     Diabetes mellitus ([facility] [address]     ESRD (end stage renal disease) ([facility] [address]     Hemodialysis patient ([facility] [address]     Hyperlipidemia     Hypertension     MRSA nasal colonization 07/17/2019    Thyroid disease        Past Surgical History: All past surgical history was reviewed today. Past Surgical History:   Procedure Laterality Date    FOOT SURGERY Left 08/15/2017    LEFT SECOND TOE INCISION AND DEBRIDEMENT OF TISSUE AND BONE WITH OPEN ARTHROPLASTY    INCISION AND DRAINAGE Left 7/19/2019    INCISION AND DRAINAGE OF DEEP ABSCESS OF LEFT LEG AND LEFT SEPTIC  KNEE ARTHROTOMY performed by Robert Henriquez MD at [address] [place] [address] Left 08/17/2017    INCISION AND DRAINAGE, 2ND DIGIT AMPUTATION LEFT FOOT         Immunization History: All immunization history was reviewed by me today. There is no immunization history on file for this patient. Family History: All family history was reviewed today. History reviewed. No pertinent family history.     Objective:       PHYSICAL EXAM:      Vitals: 2.36*   HGB 7.8* 7.9* 8.0*   HCT 23.4* 23.2* 23.9*     --  507*   .6*  --  101.4*   MCH 33.8  --  34.0   MCHC 33.3  --  33.5   RDW 14.1  --  13.9        BMP:  Recent Labs     07/21/19  0524 07/22/19  0541 07/23/19  0628    136 135*   K 5.0 5.8* 4.8   CL 91* 92* 91*   CO2 24 24 22   BUN 66* 83* 48*   CREATININE 8.3* 10.6* 6.5*   CALCIUM 9.3 9.8 10.0   GLUCOSE 178* 153* 109*        Hepatic Function Panel:   Lab Results   Component Value Date    ALKPHOS 102 08/14/2017    ALT 12 08/14/2017    AST 13 08/14/2017    PROT 7.3 08/14/2017    BILITOT 0.3 08/14/2017    LABALBU 3.1 07/23/2019       CPK: No results found for: CKTOTAL  ESR:   Lab Results   Component Value Date    SEDRATE >120 (H) 07/22/2019     CRP:   Lab Results   Component Value Date    .5 (H) 07/22/2019           Imaging: All pertinent images and reports for the current visit were reviewed by me during this visit. CT ABDOMEN PELVIS WO CONTRAST Additional Contrast? None   Final Result      Cholelithiasis. Large anterior midline, lower abdominal wall hernia, containing fat of the bowel, including mid transverse colon and small bowel. There is no evidence of obstruction. No other acute findings in the abdomen or pelvis. CT FOOT LEFT WO CONTRAST   Final Result      Amputation of the second digit at the metatarsophalangeal joint      Cortical erosion involving the second metatarsal head, which may be related to clinically suspected osteomyelitis. Suspected fibroids infraction involving the third metatarsal head      Recommend MRI foot without with IV contrast for further evaluation of the above findings, as clinically indicated      CT TIBIA FIBULA LEFT WO CONTRAST   Final Result      Large subcutaneous fluid collection abutting the anterolateral muscle group with differential diagnosis including chronic hematoma, large seroma or abscess.       Large suprapatellar joint effusion      High-grade tricompartmental degenerative osteoarthritis of the knee      NM INFLAMMATORY WBC LIMITED W CERETEC   Final Result      Probable abscess in the lateral aspect of the left lower leg. Focal uptake in the plantar surface of the left heel most suggestive of osteomyelitis. VASCULAR REPORT   Final Result      XR KNEE LEFT (1-2 VIEWS)   Final Result      Marked degenerative change lateral compartment and patellofemoral compartment. Large joint effusion. No fracture. If concern for acute injury splinting with follow-up recommended. Examination limited due to underpenetration. LASER SKIN PERFUSION PRESSURE STUDY   Final Result      XR TIBIA FIBULA LEFT (2 VIEWS)   Final Result   Impression: No acute fracture or bony destruction. No discrete soft tissue abnormality. Degenerative changes of the knee, as above. XR FOOT LEFT (MIN 3 VIEWS)   Final Result      Acute osteomyelitis in the 4th distal phalanx. Medications: All current and past medications were reviewed.      senna  2 tablet Oral Nightly    b complex-C-folic acid  1 capsule Oral Daily    darbepoetin jay-polysorbate  60 mcg Intravenous Weekly - Monday    vancomycin (VANCOCIN) intermittent dosing (placeholder)   Other RX Placeholder    insulin lispro  0-18 Units Subcutaneous TID WC    insulin lispro  0-9 Units Subcutaneous Nightly    mupirocin   Nasal BID    docusate sodium  100 mg Oral BID    famotidine  20 mg Oral Daily    sodium hypochlorite   Irrigation Daily    heparin (porcine)  2,500 Units Intravenous Q MWF    sodium chloride flush  10 mL Intravenous 2 times per day    aspirin  81 mg Oral Daily    amLODIPine  10 mg Oral Daily    pravastatin  20 mg Oral Nightly    sevelamer  3,200 mg Oral TID WC    piperacillin-tazobactam  3.375 g Intravenous Q12H    insulin glargine  30 Units Subcutaneous Nightly    levothyroxine  50 mcg Oral QAM AC    cyclobenzaprine  10 mg Oral Nightly    [Held by

## 2019-07-24 NOTE — DISCHARGE SUMMARY
Resp 16   Ht 5' 7\" (1.702 m)   Wt 292 lb 3.2 oz (132.5 kg)   SpO2 100%   BMI 45.76 kg/m²     General appearance:  Appears comfortable. Well nourished. Obese. Eyes: Sclera clear, pupils equal, round, and reactive to light. ENT: Moist mucus membranes, no thrush. Trachea midline. Cardiovascular: Regular rhythm, normal S1, S2. No murmur, gallop, rub. No edema in lower extremities. Respiratory: Clear to auscultation bilaterally, no wheeze, good inspiratory effort. Gastrointestinal: Abdomen soft, non-tender, not distended, normal bowel sounds. Musculoskeletal:  Left leg wrapped in dressings around knee and lower leg. No obvious surrounding erythema or drainage. Neurology: Cranial nerves grossly intact. Alert and oriented in time, place and person. No speech or motor deficits. Psychiatry: Appropriate affect. Not agitated. Skin: Warm, dry, normal turgor, no rash.     Consults: ID, orthopedic surgery and podiatry  Significant Diagnostic Studies:  NM scan foot, CT scan foot, xrays left foot and lower leg  Treatments: Incision and drainage of left knee and left lower leg, aspiration of left knee joint, antibiotics  Disposition: SNF  Discharged Condition: Stable  Follow Up: Primary Care Physician in one week    DISCHARGE MEDICATION:     Medication List      START taking these medications    bisacodyl 10 MG suppository  Commonly known as:  DULCOLAX  Place 1 suppository rectally daily as needed for Constipation     collagenase 250 UNIT/GM ointment  Apply topically daily. docusate 100 MG Caps  Commonly known as:  COLACE, DULCOLAX  Take 100 mg by mouth 2 times daily     famotidine 20 MG tablet  Commonly known as:  PEPCID  Take 1 tablet by mouth daily     mupirocin 2 % ointment  Commonly known as:  BACTROBAN  Apply topically 3 times daily.      senna 8.6 MG tablet  Commonly known as:  SENOKOT  Take 2 tablets by mouth nightly as needed for Constipation        CHANGE how you take these medications oxyCODONE-acetaminophen 7.5-325 MG per tablet  Commonly known as:  PERCOCET  Take 1 tablet by mouth every 6 hours as needed for Pain for up to 3 days. What changed:  Another medication with the same name was removed. Continue taking this medication, and follow the directions you see here. CONTINUE taking these medications    acetaminophen 325 MG tablet  Commonly known as:  TYLENOL     amLODIPine 10 MG tablet  Commonly known as:  NORVASC     aspirin 81 MG tablet     aspirin-acetaminophen-caffeine 250-250-65 MG per tablet  Commonly known as:  EXCEDRIN MIGRAINE     bumetanide 1 MG tablet  Commonly known as:  BUMEX     cyclobenzaprine 10 MG tablet  Commonly known as:  FLEXERIL     insulin glargine 100 UNIT/ML injection vial  Commonly known as:  LANTUS     insulin lispro 100 UNIT/ML injection vial  Commonly known as:  HUMALOG     levothyroxine 50 MCG tablet  Commonly known as:  SYNTHROID     norethindrone 5 MG tablet  Commonly known as:  AYGESTIN     nystatin 632503 UNIT/GM ointment  Commonly known as:  MYCOSTATIN     pravastatin 20 MG tablet  Commonly known as:  PRAVACHOL     LILIA CAPS 1 MG Caps     * sevelamer 800 MG tablet  Commonly known as:  RENVELA     * sevelamer 800 MG tablet  Commonly known as:  RENVELA     triamcinolone 0.1 % ointment  Commonly known as:  KENALOG         * This list has 2 medication(s) that are the same as other medications prescribed for you. Read the directions carefully, and ask your doctor or other care provider to review them with you.             STOP taking these medications    cephALEXin 500 MG capsule  Commonly known as:  KEFLEX     ibuprofen 200 MG tablet  Commonly known as:  ADVIL;MOTRIN     spironolactone 25 MG tablet  Commonly known as:  ALDACTONE           Where to Get Your Medications      You can get these medications from any pharmacy    Bring a paper prescription for each of these medications  · bisacodyl 10 MG suppository  · collagenase 250 UNIT/GM

## 2019-07-24 NOTE — PROGRESS NOTES
ID Follow-up NOTE    CC:   DM foot ulcer, L knee septic arthritis  Antibiotics: Vancomycin, Ciprofloxacin, Metronidazole    Admit Date: 2019  Hospital Day: 9    Subjective:     POD#5 L knee I7D  POD#5 L leg I&D    Pt reports she is doing well      Objective:     Patient Vitals for the past 8 hrs:   BP Temp Temp src Pulse Resp SpO2 Weight   19 1826 (!) 167/78 97.6 °F (36.4 °C) Oral 78 18 -- --   19 1545 (!) 154/71 98 °F (36.7 °C) Oral 77 16 97 % --   19 1441 (!) 137/34 98 °F (36.7 °C) -- 77 16 -- 290 lb 12.6 oz (131.9 kg)     I/O last 3 completed shifts: In: 124.4 [I.V.:124.4]  Out: -   No intake/output data recorded. EXAM:  GENERAL: No apparent distress. RA  HEENT: Membranes moist, no oral lesion  NECK:  Supple  LUNGS: Clear b/l, no rales, no dullness  CARDIAC: RRR, no murmur appreciated  ABD:  + BS, soft / NT  EXT:  L knee with dressing     L lower leg with dressing / ACE  NEURO: No focal neurologic findings  PSYCH: Orientation, sensorium, mood normal  LINES:  Peripheral iv, L arm AVF       Data Review:  Lab Results   Component Value Date    WBC 12.8 (H) 2019    HGB 7.8 (L) 2019    HCT 23.1 (L) 2019    .1 (H) 2019     (H) 2019     Lab Results   Component Value Date    CREATININE 8.7 (HH) 2019    BUN 66 (H) 2019     (L) 2019    K 5.1 2019    CL 92 (L) 2019    CO2 21 2019       Hepatic Function Panel:   Lab Results   Component Value Date    ALKPHOS 102 2017    ALT 12 2017    AST 13 2017    PROT 7.3 2017    BILITOT 0.3 2017    LABALBU 2.8 2019       7/18 L knee - 74,000 WBC - 55%PMN, 700,000 RBC    MICRO:   L calf cult: mixed skin franklin   L knee fluid:  Diphtheroids from broth   Cult - no growth   Nasal DNA probe +   BC - no growth     IMAGIN/19 WBC scan:  Probable abscess in the lateral aspect of the left lower leg.   Focal uptake in the plantar surface of the left heel most suggestive of osteomyelitis    7/18 L knee x-ray:  Marked degenerative change lateral compartment and patellofemoral compartment. Large joint effusion. No fracture. If concern for acute injury splinting with follow-up recommended. Examination limited due to underpenetration    7/18 Laser perfusion vascular study:  Left  Laser Skin Perfusion Pressure study at the left dorsum of the foot is 52 mmHg;  wound healing likely. This is considered to have a good probability for  healing. Laser Skin Perfusion Pressure study at the left lateral plantar area of the  foot is 62 mmHg: wound healing likely. This is considered to have a good  probability for healing. Pulse Volume Recording at the transmetatarsal level reveals moderately  abnormal waveforms.     Scheduled Meds:   collagenase   Topical Daily    senna  2 tablet Oral Nightly    b complex-C-folic acid  1 capsule Oral Daily    ciprofloxacin  500 mg Oral Daily    metroNIDAZOLE  500 mg Oral 3 times per day    darbepoetin jay-polysorbate  60 mcg Intravenous Weekly - Monday    vancomycin (VANCOCIN) intermittent dosing (placeholder)   Other RX Placeholder    insulin lispro  0-18 Units Subcutaneous TID WC    insulin lispro  0-9 Units Subcutaneous Nightly    mupirocin   Nasal BID    docusate sodium  100 mg Oral BID    famotidine  20 mg Oral Daily    heparin (porcine)  2,500 Units Intravenous Q MWF    sodium chloride flush  10 mL Intravenous 2 times per day    aspirin  81 mg Oral Daily    amLODIPine  10 mg Oral Daily    pravastatin  20 mg Oral Nightly    sevelamer  3,200 mg Oral TID WC    insulin glargine  30 Units Subcutaneous Nightly    levothyroxine  50 mcg Oral QAM AC    cyclobenzaprine  10 mg Oral Nightly    [Held by provider] heparin (porcine)  5,000 Units Subcutaneous 3 times per day       Continuous Infusions:   dextrose         PRN Meds:  labetalol, sevelamer, glucose, dextrose, glucagon (rDNA), dextrose, sodium

## 2019-07-24 NOTE — DISCHARGE SUMMARY
vial Inject 30 Units into the skin nightly      insulin lispro (HUMALOG) 100 UNIT/ML injection vial Inject 5 Units into the skin 3 times daily (before meals) If BG > or = 150      B Complex-C-Folic Acid (LILIA CAPS) 1 MG CAPS Take 1 capsule by mouth daily       aspirin 81 MG tablet Take 81 mg by mouth daily      nystatin (MYCOSTATIN) 688907 UNIT/GM ointment Apply topically 3 times daily Apply topically 2 times daily. spironolactone (ALDACTONE) 25 MG tablet Take 25 mg by mouth 2 times daily       triamcinolone (KENALOG) 0.1 % ointment Apply topically 2 times daily Apply topically 2 times daily. norethindrone (AYGESTIN) 5 MG tablet Take 5 mg by mouth 2 times daily       pravastatin (PRAVACHOL) 20 MG tablet Take 20 mg by mouth nightly       !! sevelamer (RENVELA) 800 MG tablet Take 4 tablets by mouth 3 times daily (with meals)      amLODIPine (NORVASC) 10 MG tablet Take 10 mg by mouth daily       ! ! - Potential duplicate medications found. Please discuss with provider. Time Spent on discharge is more than {Time; 15 min - 8 hours:12482} in the examination, evaluation, counseling and review of medications and discharge plan. Signed:    Elisabeth Molina   7/24/2019      Thank you Rufus Lo for the opportunity to be involved in this patient's care. If you have any questions or concerns please feel free to contact me at [phone]

## 2019-07-24 NOTE — PROGRESS NOTES
dose  · Will dose vancomycin based on intermittent levels given ESRD on HD. Ordered 1.25g to be given today after HD. Plan to eventually schedule vancomycin doses with dialysis. Next level will be due Friday 7/26. · Clinical status will be monitored and repeat doses/levels will be ordered as appropriate. Thank you for the consult! Please call with any questions.   Richard MaravillaD  [phone]  7/24/2019 9:24 AM

## 2019-07-24 NOTE — PROGRESS NOTES
intervention from podiatry standpoint. Patient is ok to be discharged from Podiatry standpoint. Patient will f/u with Dr. Miguel Celis in 1 week after discharge. Discussed assessment and plan with Dr. Miguel Celis. Yuval Barthel, PGY-1  Pager: [phone]    Patient was seen and evaluated at bedside. Agree with residents assessment and treatment plan.   Nolan Hawkins DPM

## 2019-07-24 NOTE — PROGRESS NOTES
Suspected fibroids infraction involving the third metatarsal head      Recommend MRI foot without with IV contrast for further evaluation of the above findings, as clinically indicated      CT TIBIA FIBULA LEFT WO CONTRAST   Final Result      Large subcutaneous fluid collection abutting the anterolateral muscle group with differential diagnosis including chronic hematoma, large seroma or abscess. Large suprapatellar joint effusion      High-grade tricompartmental degenerative osteoarthritis of the knee      NM INFLAMMATORY WBC LIMITED W CERETEC   Final Result      Probable abscess in the lateral aspect of the left lower leg. Focal uptake in the plantar surface of the left heel most suggestive of osteomyelitis. VASCULAR REPORT   Final Result      XR KNEE LEFT (1-2 VIEWS)   Final Result      Marked degenerative change lateral compartment and patellofemoral compartment. Large joint effusion. No fracture. If concern for acute injury splinting with follow-up recommended. Examination limited due to underpenetration. LASER SKIN PERFUSION PRESSURE STUDY   Final Result      XR TIBIA FIBULA LEFT (2 VIEWS)   Final Result   Impression: No acute fracture or bony destruction. No discrete soft tissue abnormality. Degenerative changes of the knee, as above. XR FOOT LEFT (MIN 3 VIEWS)   Final Result      Acute osteomyelitis in the 4th distal phalanx. Assessment/Plan: Sunitha Madsen is a 61 y.o. female, who was admitted with DFI, found to have osteomyelitis.     Diabetic foot infection:   Full thickness ulceration of L plantar heel 2/2 pressure. Hematoma left leg from fall. Osteomyelitis, left 4th digit. Refused MRI. NM study showed probable abscess in the left lower leg and focal uptake in the heel suggestive of osteomyelitis.  -Podiatry following  -ID following for antibiotics.  Discharge with PO cipro and flagyl, IV vanc with dialysis    Septic knee, left with left leg abscess  S/p I&D on 7/19 by Dr. Gabriel King.    -continue vanc/zosyn pending cultures  -Percocet for pain control  -PT/OT  -Precert started for SNF, should be able to go today or tomorrow    ESRD on HD (MWF)  -continue low K diet      HTN  Continue home amlodipine     DMII  -Home lantus 30 units nightly for now (pt reported BID use at home)  -sliding    Vaginal bleeding  -will need outpatient Gyn work-up, discussed with patient    ============================================================================  Code status: Full Code  FEN: Diet NPO, After Midnight  DIET CARB CONTROL; Low Potassium  PPx: Heparin Subq  Case management: Consulted for home care needs    This patient will be staffed with Tanna Chamorro MD.     Radha Isabel DO, PGY-3  Internal Medicine   7/24/2019  3:37 PM         Patient seen and examined, plan of care discussed with residents. Agree with their assessment and plan with following addendum:  Patient is doing much better, pain is controlled with percocet. SW on board for placement.        Tanna Chamorro

## 2019-07-25 NOTE — PLAN OF CARE
Problem: Falls - Risk of:  Goal: Will remain free from falls  Description  Will remain free from falls  Outcome: Ongoing   Patient is a fall risk. See Fall Risk Assessment for details. Bed is in low, lock position; call bell/belongings within reach. Calls appropriately when assistance is needed. Hourly rounds and bed alarm in place for safety. Will continue to monitor and reassess throughout shift. Problem: Pain:  Description  Pain management should include both nonpharmacologic and pharmacologic interventions. Goal: Pain level will decrease  Description  Pain level will decrease  Outcome: Ongoing  Goal: Control of acute pain  Description  Control of acute pain  Outcome: Ongoing  Goal: Control of chronic pain  Description  Control of chronic pain  Outcome: Ongoing   Pt reports adequate control of pain symptoms with use of currently prescribed prn and scheduled pain medications. Pt educated on pain medications including dose, frequency and side effects. Pt using verbal 0-10 pain scale without difficulty. Pt encouraged to notify staff of pain. Problem: Skin Integrity:  Goal: Will show no infection signs and symptoms  Description  Will show no infection signs and symptoms  Outcome: Ongoing  Goal: Absence of new skin breakdown  Description  Absence of new skin breakdown  Outcome: Ongoing   Pt with no new skin breakdown during this shift. Vital signs are stable, pt able to transfer appropriately. Pt able to turn self. Continue with current care.

## 2019-07-25 NOTE — PROGRESS NOTES
Internal Medicine Resident Progress Note        PCP: Riley Anders [id number]    Date of Admission: 7/16/2019    Chief Complaint:     Hospital Course: ***     Subjective:     No Acute Events overnight. Discharge to SNF was cancelled yesterday due to an insurance issues. Plan is to be discharged today. Feeling well, pain in leg and knee has improved. Denies any Chest Pain, SOB, Abdominal Pain, Nausea or Vomiting.         Medications:  Reviewed    Infusion Medications    dextrose       Scheduled Medications    collagenase   Topical Daily    senna  2 tablet Oral Nightly    b complex-C-folic acid  1 capsule Oral Daily    darbepoetin jay-polysorbate  60 mcg Intravenous Weekly - Monday    vancomycin (VANCOCIN) intermittent dosing (placeholder)   Other RX Placeholder    insulin lispro  0-18 Units Subcutaneous TID WC    insulin lispro  0-9 Units Subcutaneous Nightly    mupirocin   Nasal BID    docusate sodium  100 mg Oral BID    famotidine  20 mg Oral Daily    heparin (porcine)  2,500 Units Intravenous Q MWF    sodium chloride flush  10 mL Intravenous 2 times per day    aspirin  81 mg Oral Daily    amLODIPine  10 mg Oral Daily    pravastatin  20 mg Oral Nightly    sevelamer  3,200 mg Oral TID WC    insulin glargine  30 Units Subcutaneous Nightly    levothyroxine  50 mcg Oral QAM AC    cyclobenzaprine  10 mg Oral Nightly    [Held by provider] heparin (porcine)  5,000 Units Subcutaneous 3 times per day     PRN Meds: labetalol, sevelamer, glucose, dextrose, glucagon (rDNA), dextrose, sodium chloride flush, ondansetron, bisacodyl, acetaminophen, oxyCODONE-acetaminophen      Intake/Output Summary (Last 24 hours) at 7/25/2019 0947  Last data filed at 7/25/2019 0636  Gross per 24 hour   Intake 360 ml   Output 0 ml   Net 360 ml       Physical Exam Performed:    BP (!) 153/63   Pulse 78   Temp 98.4 °F (36.9 °C) (Oral)   Resp 16   Ht 5' 7\" (1.702 m)   Wt 292 lb 3.2 oz (132.5 kg)   SpO2 100%   BMI 45.76 kg/m² Was supposed to head to SNF yesterday. Was not able to go     Knee Pain  S/p I&D of left knee/left calf. Still in Pain today Being followed by Ortho and Podiatry.   -Dilaudid for pain control  -Continue Incentive Spirometry        Vaginal Bleeding  Experienced some RLQ abdominal Sunday. Pain progressively got worse. Passed a vaginal clot Sunday night Said she last passed a clot 5-6 years ago. States that she takes HRT (doesn't remember name). Said that she hasn't taken this replacement since admitted to the hospital  -CT scan  was negative  -Possibly d/t not taking normal HRT  -Plan to F/u with OB outpatient        Diabetic Foot Infection  -Podiatry On Board  -Diphteroids on culture  -Vanc  -Nuclear showed probable abscess in lateral aspect of left leg- I&D on Friday  -CT Tib/Fib- Ordered because patient is claustrophobic and didn't want to undergo MRI. Showed suprapatllar joint effusion, potential hematoma, seroma or abscess in lateral leg, and high grade tricompartmental degenerative osteoarthritis of the knee- I&D Friday  -Will be discharge on Vancomycin which she will receive at dialysis  Hyperkalemia  K-4.3 ESRD on HD MWF.   -Dialyzed yesterday  -Nephrology on board     HTN  -Continue Home Amolodipine     Diabetes  Glucose elevated on admission  -30 Lantus and now high dose sliding scale      Labs:   Recent Labs     07/23/19  0628 07/24/19  0450   WBC 16.3* 12.8*   HGB 8.0* 7.8*   HCT 23.9* 23.1*   * 509*     Recent Labs     07/23/19  0628 07/24/19  0451 07/25/19  0557   * 134* 136   K 4.8 5.1 4.3   CL 91* 92* 93*   CO2 22 21 29   BUN 48* 66* 35*   CREATININE 6.5* 8.7* 5.9*   CALCIUM 10.0 9.5 9.7   PHOS 7.3* 8.7* 6.7*     No results for input(s): AST, ALT, BILIDIR, BILITOT, ALKPHOS in the last 72 hours. No results for input(s): INR in the last 72 hours. No results for input(s): Donna  in the last 72 hours.     Urinalysis:    No results found for: NITRU, [address], BACTERIA, RBCUA, Aliriotorri Reynoldsxiang [facility] [address]    Radiology:  CT ABDOMEN PELVIS WO CONTRAST Additional Contrast? None   Final Result      Cholelithiasis. Large anterior midline, lower abdominal wall hernia, containing fat of the bowel, including mid transverse colon and small bowel. There is no evidence of obstruction. No other acute findings in the abdomen or pelvis. CT FOOT LEFT WO CONTRAST   Final Result      Amputation of the second digit at the metatarsophalangeal joint      Cortical erosion involving the second metatarsal head, which may be related to clinically suspected osteomyelitis. Suspected fibroids infraction involving the third metatarsal head      Recommend MRI foot without with IV contrast for further evaluation of the above findings, as clinically indicated      CT TIBIA FIBULA LEFT WO CONTRAST   Final Result      Large subcutaneous fluid collection abutting the anterolateral muscle group with differential diagnosis including chronic hematoma, large seroma or abscess. Large suprapatellar joint effusion      High-grade tricompartmental degenerative osteoarthritis of the knee      NM INFLAMMATORY WBC LIMITED W CERETEC   Final Result      Probable abscess in the lateral aspect of the left lower leg. Focal uptake in the plantar surface of the left heel most suggestive of osteomyelitis. VASCULAR REPORT   Final Result      XR KNEE LEFT (1-2 VIEWS)   Final Result      Marked degenerative change lateral compartment and patellofemoral compartment. Large joint effusion. No fracture. If concern for acute injury splinting with follow-up recommended. Examination limited due to underpenetration. LASER SKIN PERFUSION PRESSURE STUDY   Final Result      XR TIBIA FIBULA LEFT (2 VIEWS)   Final Result   Impression: No acute fracture or bony destruction. No discrete soft tissue abnormality. Degenerative changes of the knee, as above.       XR FOOT LEFT (MIN 3 VIEWS)

## 2019-07-25 NOTE — PROGRESS NOTES
ID Follow-up NOTE    CC:   DM foot ulcer, L knee septic arthritis  Antibiotics: Vancomycin    Admit Date: 2019  Hospital Day: 10    Subjective:     POD#6 L knee I&D  POD#6 L leg I&D    Pt reports she is doing well      Objective:     Patient Vitals for the past 8 hrs:   BP Temp Temp src Pulse Resp SpO2 Weight   19 0834 (!) 153/63 98.4 °F (36.9 °C) Oral 78 16 100 % --   19 0450 (!) 165/67 98.2 °F (36.8 °C) Oral 70 16 96 % 292 lb 3.2 oz (132.5 kg)     I/O last 3 completed shifts: In: 360 [P.O.:360]  Out: 0   No intake/output data recorded. EXAM:  GENERAL: No apparent distress. RA  HEENT: Membranes moist, no oral lesion  NECK:  Supple  LUNGS: Clear b/l, no rales, no dullness  CARDIAC: RRR, no murmur appreciated  ABD:  + BS, soft / NT  EXT:  L knee with dressing     L lower leg with dressing / ACE  NEURO: No focal neurologic findings  PSYCH: Orientation, sensorium, mood normal  LINES:  Peripheral iv, L arm AVF       Data Review:  Lab Results   Component Value Date    WBC 12.8 (H) 2019    HGB 7.8 (L) 2019    HCT 23.1 (L) 2019    .1 (H) 2019     (H) 2019     Lab Results   Component Value Date    CREATININE 5.9 (HH) 2019    BUN 35 (H) 2019     2019    K 4.3 2019    CL 93 (L) 2019    CO2 29 2019       Hepatic Function Panel:   Lab Results   Component Value Date    ALKPHOS 102 2017    ALT 12 2017    AST 13 2017    PROT 7.3 2017    BILITOT 0.3 2017    LABALBU 3.0 2019 L knee - 74,000 WBC - 55%PMN, 700,000 RBC    MICRO:   L calf cult: mixed skin franklin   L knee fluid:  Diphtheroids from broth   Cult - no growth   Nasal DNA probe +   BC - no growth     IMAGIN/19 WBC scan:  Probable abscess in the lateral aspect of the left lower leg.   Focal uptake in the plantar surface of the left heel most suggestive of osteomyelitis     L knee x-ray:  Marked

## 2019-07-25 NOTE — CARE COORDINATION
Case Management Assessment           Daily Note                 Date/ Time of Note: 7/25/2019 8:40 AM         Note completed by: Leoncio Nabila Day    Patient Name: Patience Espinaliday  YOB: 1960    Diagnosis:Diabetic foot infection ([facility] [address] [E11.628, L08.9]  Diabetic foot infection ([facility] [address] [E11.628, L08.9]  Patient Admission Status: Inpatient    Date of Admission:7/16/2019  3:11 PM Length of Stay: 9 GLOS: GMLOS: 6.1      Current Plan of Care: D/C today to [facility]  ________________________________________________________________________________________  PT AM-PAC: 9 / 24 per last evaluation on: 7/23    OT AM-PAC: 15 / 24 per last evaluation on: 7/23    DME Needs for discharge: defer  ________________________________________________________________________________________  Discharge Plan: SNF: [facility]    Tentative discharge date: 7/25    Current barriers to discharge: Transportation, HENS    Referrals completed: SNF: [id number]    Resources/ information provided: [facility] List  ________________________________________________________________________________________  Case Management Notes: SW received call from Jessica ([phone]) at [facility]. She reported they can accept patient today. Patient can utilize [facility] transport. SW to update patient and work on discharge [address] and her family were provided with choice of provider; she and her family are in agreement with the discharge plan.     Care Transition Patient: Mona Singh, MSW  [facility]  Case Management Department  Ph: [phone]  Fax: [phone]
[phone]      Potential assistance Purchasing Medications: Potential Assistance Purchasing Medications: No  Does Patient want to participate in local refill/ meds to beds program?: Yes    Meds To Beds General Rules:  1. Can ONLY be done Monday- Friday between 8:30am-5pm  2. Prescription(s) must be in pharmacy by 3pm to be filled same day  3. Copy of patient's insurance/ prescription drug card and patient face sheet must be sent along with the prescription(s)  4. Cost of Rx cannot be added to hospital bill. If financial assistance is needed, please contact unit  or ;  or  CANNOT provide pharmacy voucher for patients co-pays  5. Patients can then  the prescription on their way out of the hospital at discharge, or pharmacy can deliver to the bedside if staff is available. (payment due at time of pick-up or delivery - cash, check, or card accepted)     Able to afford home medications/ co-pay costs: Yes    ADLS  Support Systems: Spouse/Significant Other    PT AM-PAC: 8 /24  OT AM-PAC: 15 /24    [place]: one level home  Steps: 3+1    Plans to RETURN to current housing: No  Barriers to RETURNING to current housing: will need therapy prior        Dialysis  Active with HD/PD prior to admission: Yes  Nephrologist: Dr Rosas [place]:  Brit Handing  Address: [address] [facility]  Phone: [phone]    DISCHARGE PLAN:  Disposition: [address] for discharge: EMS transportation     Factors facilitating achievement of predicted outcomes: Family support, Cooperative and Pleasant    Barriers to discharge: none noted    Additional Case Management Notes: MARY Vazquez and her family were provided with choice of provider; she and her family are in agreement with the discharge plan.     Care Transition patient: No    Alicia Johnson RN  [facility]  Case Management Department  Ph: [phone]   Fax: [phone]
medications/ co-pay costs: N/a    ADLS:  Current PT AM-PAC Score: 9 /24  Current OT AM-PAC Score: 15 /24      Discharge Disposition: [place] (SNF): [facility] Phone: [phone] Fax: [phone]    LOC at discharge: Skilled  Haylee Monreal Completed: Yes    Notification completed in HENS/PAS?:  Yes : CM has completed HENS online through secure website for SNF admission at [phone]. Document ID #: [id number]    IMM Completed:   Yes, Case management has presented and reviewed IMM letter #2 to the patient and/or family/ POA. Patient and/or family/POA verbalized understanding of their medicare rights and appeal process if needed. Patient and/or family/POA has signed, initialed and placed today's date (7/25) and time ([phone]) on IMM letter #2 on the the appropriate lines. Patient and/or family/POA, copy of letter offered and they are aware that this original copy of IMM letter #2 is available prior to discharge from the paper chart on the unit. Electronic documentation has been entered into epic for IMM letter #2 and original paper copy has been added to the paper chart at the nurses station. Transportation:  Transportation Plan for discharge: EMS transportation   Mode of Transport: Ambulance stretcher - BLS    Reason for medical transport: Bed confined: Meets the following criteria 1) unable to get out of bed without assistance or ambulate, 2) unable to safely sit up in a wheelchair, 3) unable to maintain erect seating position in a chair for time needed for transport  Name of Transport Company: [employer]  Phone: [phone]  Transport Time: 1:00 pm       Transportation form completed: Yes    Dialysis:  Dialysis patient: Yes    Dialysis Center:  [facility]  Address: [address] [place] [address]  Phone: [phone]    Referrals made at Discharge for outpatient continued care:  Not Applicable     SW met with patient at bedside completed IMM and reviewed discharge plan. No other needs at this time.        [facility]

## 2019-07-27 NOTE — ED NOTES
Bed: A10-10  Expected date:   Expected time:   Means of arrival:   Comments:  Mario Bautista RN  07/27/19 112

## 2019-07-27 NOTE — ED PROVIDER NOTES
.      Date ofevaluation: 7/27/2019    Chief Complaint   Wound Dehiscence (hematoma removed 7/19 with a TKA on left leg)      Nursing Notes, Past Medical Hx, Past Surgical Hx, Social Hx, Allergies, and Family Hx were reviewed. History of Present Illness     Charmayne Agee is a 61 y.o. female who presents with bleeding from her recent surgical wound. Patient was just discharged from [facility] 2 days ago after being admitted for a left lower extremity infection. Ultimately went to the operating room on July 18 for washout of septic left knee as well as removal of an infected hematoma from the left calf. Patient presents today because she was having therapy at her rehab facility when she had bleeding from the left calf wound. This occurred when she was dangling her left leg from the bed, she reports that this is the first time that her leg has been in a dependent position since discharge. On arrival in the emergency department there is a small amount of venous bleeding. She denies any other symptoms. Specifically no lightheadedness or dizziness.     Review of Systems     Review of Systems-negative for syncope, chest pain, shortness of breath, all other systems reviewed and negative     Past Medical, Surgical, Family, and Social History         Diagnosis Date    Arthritis     Diabetes mellitus ([facility]     ESRD (end stage renal disease) ([facility]     Hemodialysis patient ([facility]     Hyperlipidemia     Hypertension     MRSA nasal colonization 07/17/2019    Thyroid disease          Procedure Laterality Date    FOOT SURGERY Left 08/15/2017    LEFT SECOND TOE INCISION AND DEBRIDEMENT OF TISSUE AND BONE WITH OPEN ARTHROPLASTY    INCISION AND DRAINAGE Left 7/19/2019    INCISION AND DRAINAGE OF DEEP ABSCESS OF LEFT LEG AND LEFT SEPTIC  KNEE ARTHROTOMY performed by Tonja Webb MD at [address] [place] [address],[facility] Left 08/17/2017    INCISION AND DRAINAGE, 2ND DIGIT AMPUTATION LEFT FOOT     Her family history

## 2019-07-27 NOTE — ED NOTES
Patient presents from [facility]. She is POD 8 from septic Left knee replacement aspiration and LLE hematoma removal. Presents with bleeding starting roughly 1hr ago. Unwrapped saturated dressing with ER resident and appears to have stitches missing. She has X2 surgical sites, row of stitches on left knee are c/d/i, no presence of redness. Lower LE site has two lower stitches with bleeding coming from the top fo surgical site.      Domonique Cardozo RN  07/27/19 0493

## 2019-07-27 NOTE — ED NOTES
Patient prepared for discharge and return to extended care facility. Discharge instructions given to transport crew after report called to maribel at [facility]. All verbalized understanding of discharge instructions and return to extended care facility.       Meredith Weinstein RN  07/27/19 1860

## 2019-08-01 NOTE — PROGRESS NOTES
(LANTUS) 100 UNIT/ML injection vial Inject 30 Units into the skin nightly      insulin lispro (HUMALOG) 100 UNIT/ML injection vial Inject 5 Units into the skin 3 times daily (before meals) If BG > or = 150      B Complex-C-Folic Acid (LILIA CAPS) 1 MG CAPS Take 1 capsule by mouth daily       aspirin 81 MG tablet Take 81 mg by mouth daily      nystatin (MYCOSTATIN) 586408 UNIT/GM ointment Apply topically 3 times daily Apply topically 2 times daily.  triamcinolone (KENALOG) 0.1 % ointment Apply topically 2 times daily Apply topically 2 times daily.  norethindrone (AYGESTIN) 5 MG tablet Take 5 mg by mouth 2 times daily       pravastatin (PRAVACHOL) 20 MG tablet Take 20 mg by mouth nightly       sevelamer (RENVELA) 800 MG tablet Take 4 tablets by mouth 3 times daily (with meals)      amLODIPine (NORVASC) 10 MG tablet Take 10 mg by mouth daily       No current facility-administered medications on file prior to visit. Pertinent items are noted in HPI  Review of systems reviewed from Patient History Form dated on 8/1/2019 and available in the patient's chart under the Media tab. No change noted. PHYSICAL EXAMINATION:  Ms. Arvind Ricketts is a very pleasant 61 y.o.  female who presents today in no acute distress, awake, alert, and oriented. She is well dressed, nourished and  groomed. Patient with normal affect. Height is  5' 7\" (1.702 m), weight is 284 lb (128.8 kg), Body mass index is 44.48 kg/m². Resting respiratory rate is 16. The incision healing well, sutures removed. No signs of any erythema or drainage, minimal swelling. She has no pain with the active or passive range of motion of the left knee, decreased ROM. She has chronic decreased sensation distally, and she is neurovascularly intact. Surgical pathology consistent with necrotic tissue. IMPRESSION:  2 weeks out from left septic knee and deep abscess incision and drainage, and doing very well.     PLAN: She can go back to normal activity with no heavy impact activities for 6 weeks, WBAT with PT in [place]. Dressing change PRN. The patient will come back for a follow up in 6 weeks. The patient understands that there is a chance of abscess recurrence even after surgical I&D.     Robert Henriquez MD

## 2019-08-02 NOTE — TELEPHONE ENCOUNTER
Royce Rosales w/ [facility] called to obtain most recent office note once complete. She would like the note faxed to her.     Fax: [phone]    Questions call Royce Rosales:  [phone]

## 2019-09-10 NOTE — PROGRESS NOTES
Tobacco Use    Smoking status: Former Smoker     Packs/day: 0.25     Years: 20.00     Pack years: 5.00     Types: Cigarettes     Last attempt to quit: 7/17/2016     Years since quitting: 3.1    Smokeless tobacco: Never Used   Substance and Sexual Activity    Alcohol use: No    Drug use: No    Sexual activity: Yes     Partners: Male   Lifestyle    Physical activity:     Days per week: Not on file     Minutes per session: Not on file    Stress: Not on file   Relationships    Social connections:     Talks on phone: Not on file     Gets together: Not on file     Attends Nondenominational service: Not on file     Active member of club or organization: Not on file     Attends meetings of clubs or organizations: Not on file     Relationship status: Not on file    Intimate partner violence:     Fear of current or ex partner: Not on file     Emotionally abused: Not on file     Physically abused: Not on file     Forced sexual activity: Not on file   Other Topics Concern    Not on file   Social History Narrative    Not on file       No family history on file.     Current Outpatient Medications on File Prior to Visit   Medication Sig Dispense Refill    docusate (COLACE, DULCOLAX) 100 MG CAPS Take 100 mg by mouth 2 times daily 60 capsule 0    famotidine (PEPCID) 20 MG tablet Take 1 tablet by mouth daily 60 tablet 3    aspirin-acetaminophen-caffeine (EXCEDRIN MIGRAINE) 250-250-65 MG per tablet Take 1 tablet by mouth every 6 hours as needed for Headaches      acetaminophen (TYLENOL) 325 MG tablet Take 650 mg by mouth every 6 hours as needed for Pain      bumetanide (BUMEX) 1 MG tablet Take 2 mg by mouth 2 times daily      cyclobenzaprine (FLEXERIL) 10 MG tablet Take 10 mg by mouth nightly      levothyroxine (SYNTHROID) 50 MCG tablet Take 50 mcg by mouth Daily      insulin glargine (LANTUS) 100 UNIT/ML injection vial Inject 30 Units into the skin nightly      insulin lispro (HUMALOG) 100 UNIT/ML injection vial Inject 5 Units into the skin 3 times daily (before meals) If BG > or = 150      B Complex-C-Folic Acid (LILIA CAPS) 1 MG CAPS Take 1 capsule by mouth daily       aspirin 81 MG tablet Take 81 mg by mouth daily      nystatin (MYCOSTATIN) 096441 UNIT/GM ointment Apply topically 3 times daily Apply topically 2 times daily.  triamcinolone (KENALOG) 0.1 % ointment Apply topically 2 times daily Apply topically 2 times daily.  norethindrone (AYGESTIN) 5 MG tablet Take 5 mg by mouth 2 times daily       pravastatin (PRAVACHOL) 20 MG tablet Take 20 mg by mouth nightly       sevelamer (RENVELA) 800 MG tablet Take 4 tablets by mouth 3 times daily (with meals)      amLODIPine (NORVASC) 10 MG tablet Take 10 mg by mouth daily       No current facility-administered medications on file prior to visit. Pertinent items are noted in HPI  Review of systems reviewed from Patient History Form dated on 8/1/2019 and available in the patient's chart under the Media tab. No change noted. PHYSICAL EXAMINATION:  Ms. Slade Vergara is a very pleasant 61 y.o.  female who presents today in no acute distress, awake, alert, and oriented. She is well dressed, nourished and  groomed. Patient with normal affect. Height is  5' 7\" (1.702 m), weight is 284 lb (128.8 kg), Body mass index is 44.48 kg/m². Resting respiratory rate is 16. She ambulates today in a wheelchair. The incision healing well. No signs of any erythema or drainage, minimal to no swelling. Her skin distally is dry cracked and peeling around her left ankle. She has no pain with the active or passive range of motion of the left knee,  ROM(5-100). She has chronic decreased sensation distally, and she is neurovascularly intact. Ankle reflex 1+ bilaterally. Good strength, and no instability both upper and lower extremities. Surgical pathology consistent with necrotic tissue.      IMPRESSION:  6 weeks out from left septic knee and deep abscess incision and

## 2019-09-17 NOTE — PROGRESS NOTES
Primary Care Physician for all your health issues. 2. Resume your ordinary activities as tolerated. 3. Take your medications as prescribed by your primary care physician. 4. Check your skin daily for cracks, bruises, sores, or dryness. Use a moisturizer as needed. 5. Clean and dry your skin, using mild soap and warm water (not hot). 6. Avoid alcohol and caffeine and do not smoke. 7. Maintain a nutritious diet. Ask your primary care doctor about adding a multivitamin to your medication regimen. 8. Avoid pressure on your healed wound site. THANK YOU FOR ALLOWING US TO SERVE YOU.  PLEASE CALL IF YOU DEVELOP ANOTHER WOUND. [phone])    [] Patient unable to sign Discharge Instructions given to ECF/Transportation/POA    Electronically signed by Dominic Alvarez RN on 9/17/2019 at 11:18 AM            Electronically signed by Alvaro Baker MD on 9/17/2019 at 7:53 PM

## 2019-10-02 PROBLEM — R60.0 LEG EDEMA, LEFT: Status: ACTIVE | Noted: 2019-01-01

## 2019-11-12 PROBLEM — E66.01 MORBID OBESITY (HCC): Status: ACTIVE | Noted: 2019-01-01

## 2019-11-12 PROBLEM — I89.0 LYMPHEDEMA: Status: ACTIVE | Noted: 2019-01-01

## 2019-11-12 PROBLEM — I87.2 VENOUS (PERIPHERAL) INSUFFICIENCY: Status: ACTIVE | Noted: 2019-01-01

## 2019-11-24 PROBLEM — B99.9 INFECTION REQUIRING CONTACT ISOLATION PRECAUTIONS: Status: ACTIVE | Noted: 2017-08-15

## 2019-11-24 PROBLEM — M00.9 ARTHRITIS, SEPTIC, KNEE (HCC): Status: ACTIVE | Noted: 2019-01-01

## 2019-12-15 PROBLEM — K56.609 BOWEL OBSTRUCTION (HCC): Status: ACTIVE | Noted: 2019-01-01

## 2019-12-16 PROBLEM — A49.8 INFECTION DUE TO CUTIBACTERIUM SPECIES: Status: ACTIVE | Noted: 2019-01-01

## 2020-01-01 ENCOUNTER — HOSPITAL ENCOUNTER (OUTPATIENT)
Dept: WOUND CARE | Age: 60
Discharge: HOME | End: 2020-03-24
Payer: MEDICARE

## 2020-01-01 ENCOUNTER — OFFICE VISIT (OUTPATIENT)
Dept: INFECTIOUS DISEASES | Age: 60
End: 2020-01-01
Payer: MEDICARE

## 2020-01-01 ENCOUNTER — OFFICE VISIT (OUTPATIENT)
Dept: ORTHOPEDIC SURGERY | Age: 60
End: 2020-01-01

## 2020-01-01 ENCOUNTER — APPOINTMENT (OUTPATIENT)
Dept: CARDIAC CATH/INVASIVE PROCEDURES | Age: 60
DRG: 250 | End: 2020-01-01
Payer: MEDICARE

## 2020-01-01 ENCOUNTER — HOSPITAL ENCOUNTER (OUTPATIENT)
Dept: GENERAL RADIOLOGY | Age: 60
Discharge: HOME | End: 2020-03-03
Payer: MEDICARE

## 2020-01-01 ENCOUNTER — HOSPITAL ENCOUNTER (OUTPATIENT)
Dept: WOUND CARE | Age: 60
Discharge: HOME | End: 2020-02-11
Payer: MEDICARE

## 2020-01-01 ENCOUNTER — OFFICE VISIT (OUTPATIENT)
Dept: BARIATRICS/WEIGHT MGMT | Age: 60
End: 2020-01-01
Payer: MEDICARE

## 2020-01-01 ENCOUNTER — HOSPITAL ENCOUNTER (OUTPATIENT)
Dept: WOUND CARE | Age: 60
Discharge: HOME | End: 2020-03-16
Payer: MEDICARE

## 2020-01-01 ENCOUNTER — HOSPITAL ENCOUNTER (OUTPATIENT)
Dept: WOUND CARE | Age: 60
Discharge: HOME | End: 2020-02-26
Payer: MEDICARE

## 2020-01-01 ENCOUNTER — APPOINTMENT (OUTPATIENT)
Dept: GENERAL RADIOLOGY | Age: 60
DRG: 250 | End: 2020-01-01
Payer: MEDICARE

## 2020-01-01 ENCOUNTER — HOSPITAL ENCOUNTER (OUTPATIENT)
Dept: WOUND CARE | Age: 60
Discharge: HOME | End: 2020-03-03
Payer: MEDICARE

## 2020-01-01 ENCOUNTER — TELEPHONE (OUTPATIENT)
Dept: ORTHOPEDIC SURGERY | Age: 60
End: 2020-01-01

## 2020-01-01 ENCOUNTER — TELEPHONE (OUTPATIENT)
Dept: INFECTIOUS DISEASES | Age: 60
End: 2020-01-01

## 2020-01-01 ENCOUNTER — HOSPITAL ENCOUNTER (OUTPATIENT)
Dept: WOUND CARE | Age: 60
Discharge: HOME | End: 2020-01-28
Payer: MEDICARE

## 2020-01-01 ENCOUNTER — HOSPITAL ENCOUNTER (OUTPATIENT)
Age: 60
Discharge: HOME | End: 2020-03-03
Payer: MEDICARE

## 2020-01-01 ENCOUNTER — TELEPHONE (OUTPATIENT)
Dept: WOUND CARE | Age: 60
End: 2020-01-01

## 2020-01-01 ENCOUNTER — HOSPITAL ENCOUNTER (OUTPATIENT)
Dept: WOUND CARE | Age: 60
Discharge: HOME | End: 2020-04-07
Payer: MEDICARE

## 2020-01-01 ENCOUNTER — HOSPITAL ENCOUNTER (OUTPATIENT)
Dept: WOUND CARE | Age: 60
Discharge: HOME | End: 2020-03-10
Payer: MEDICARE

## 2020-01-01 ENCOUNTER — HOSPITAL ENCOUNTER (OUTPATIENT)
Dept: WOUND CARE | Age: 60
Discharge: HOME | End: 2020-01-14
Payer: MEDICARE

## 2020-01-01 ENCOUNTER — HOSPITAL ENCOUNTER (INPATIENT)
Age: 60
LOS: 3 days | DRG: 250 | End: 2020-04-11
Attending: EMERGENCY MEDICINE | Admitting: INTERNAL MEDICINE
Payer: MEDICARE

## 2020-01-01 VITALS
RESPIRATION RATE: 18 BRPM | TEMPERATURE: 97.9 F | HEART RATE: 80 BPM | DIASTOLIC BLOOD PRESSURE: 74 MMHG | SYSTOLIC BLOOD PRESSURE: 120 MMHG

## 2020-01-01 VITALS
DIASTOLIC BLOOD PRESSURE: 49 MMHG | BODY MASS INDEX: 40.9 KG/M2 | HEART RATE: 89 BPM | WEIGHT: 260.58 LBS | SYSTOLIC BLOOD PRESSURE: 61 MMHG | TEMPERATURE: 95.4 F | HEIGHT: 67 IN | RESPIRATION RATE: 27 BRPM | OXYGEN SATURATION: 94 %

## 2020-01-01 VITALS
DIASTOLIC BLOOD PRESSURE: 68 MMHG | TEMPERATURE: 98.1 F | BODY MASS INDEX: 41.11 KG/M2 | SYSTOLIC BLOOD PRESSURE: 143 MMHG | WEIGHT: 262.5 LBS

## 2020-01-01 VITALS
RESPIRATION RATE: 18 BRPM | TEMPERATURE: 97.5 F | SYSTOLIC BLOOD PRESSURE: 140 MMHG | DIASTOLIC BLOOD PRESSURE: 83 MMHG | HEART RATE: 74 BPM

## 2020-01-01 VITALS
DIASTOLIC BLOOD PRESSURE: 75 MMHG | TEMPERATURE: 98.1 F | HEART RATE: 85 BPM | RESPIRATION RATE: 16 BRPM | SYSTOLIC BLOOD PRESSURE: 127 MMHG

## 2020-01-01 VITALS
TEMPERATURE: 97.8 F | DIASTOLIC BLOOD PRESSURE: 75 MMHG | RESPIRATION RATE: 16 BRPM | SYSTOLIC BLOOD PRESSURE: 158 MMHG | HEART RATE: 86 BPM

## 2020-01-01 VITALS — BODY MASS INDEX: 41.12 KG/M2 | WEIGHT: 262 LBS | HEIGHT: 67 IN

## 2020-01-01 VITALS
TEMPERATURE: 98 F | DIASTOLIC BLOOD PRESSURE: 69 MMHG | HEART RATE: 80 BPM | SYSTOLIC BLOOD PRESSURE: 131 MMHG | RESPIRATION RATE: 18 BRPM

## 2020-01-01 VITALS
DIASTOLIC BLOOD PRESSURE: 75 MMHG | RESPIRATION RATE: 16 BRPM | HEART RATE: 99 BPM | TEMPERATURE: 98 F | SYSTOLIC BLOOD PRESSURE: 139 MMHG

## 2020-01-01 VITALS
SYSTOLIC BLOOD PRESSURE: 181 MMHG | TEMPERATURE: 97.8 F | HEART RATE: 96 BPM | DIASTOLIC BLOOD PRESSURE: 83 MMHG | RESPIRATION RATE: 16 BRPM

## 2020-01-01 VITALS
WEIGHT: 269.4 LBS | BODY MASS INDEX: 42.28 KG/M2 | HEART RATE: 85 BPM | SYSTOLIC BLOOD PRESSURE: 139 MMHG | HEIGHT: 67 IN | DIASTOLIC BLOOD PRESSURE: 46 MMHG

## 2020-01-01 VITALS — TEMPERATURE: 97.3 F | RESPIRATION RATE: 18 BRPM | SYSTOLIC BLOOD PRESSURE: 156 MMHG | DIASTOLIC BLOOD PRESSURE: 63 MMHG

## 2020-01-01 LAB
AFB CULTURE (MYCOBACTERIA): NORMAL
AFB SMEAR: NORMAL
ALBUMIN SERPL-MCNC: 3.2 G/DL (ref 3.4–5)
ALBUMIN SERPL-MCNC: 3.2 G/DL (ref 3.4–5)
ANION GAP SERPL CALCULATED.3IONS-SCNC: 14 MMOL/L (ref 3–16)
ANION GAP SERPL CALCULATED.3IONS-SCNC: 15 MMOL/L (ref 3–16)
ANION GAP SERPL CALCULATED.3IONS-SCNC: 15 MMOL/L (ref 3–16)
ANION GAP SERPL CALCULATED.3IONS-SCNC: 17 MMOL/L (ref 3–16)
ANION GAP SERPL CALCULATED.3IONS-SCNC: 18 MMOL/L (ref 3–16)
ANION GAP SERPL CALCULATED.3IONS-SCNC: 20 MMOL/L (ref 3–16)
ANION GAP SERPL CALCULATED.3IONS-SCNC: 21 MMOL/L (ref 3–16)
ANION GAP SERPL CALCULATED.3IONS-SCNC: 26 MMOL/L (ref 3–16)
ANION GAP SERPL CALCULATED.3IONS-SCNC: 30 MMOL/L (ref 3–16)
ANTI-XA UNFRAC HEPARIN: 0.09 IU/ML (ref 0.3–0.7)
ANTI-XA UNFRAC HEPARIN: 0.11 IU/ML (ref 0.3–0.7)
ANTI-XA UNFRAC HEPARIN: 0.15 IU/ML (ref 0.3–0.7)
ANTI-XA UNFRAC HEPARIN: 0.18 IU/ML (ref 0.3–0.7)
ANTI-XA UNFRAC HEPARIN: 0.23 IU/ML (ref 0.3–0.7)
ANTI-XA UNFRAC HEPARIN: 0.24 IU/ML (ref 0.3–0.7)
ANTI-XA UNFRAC HEPARIN: 0.86 IU/ML (ref 0.3–0.7)
APTT: 205 SEC (ref 24.2–36.2)
BANDED NEUTROPHILS RELATIVE PERCENT: 7 % (ref 0–7)
BASE EXCESS ARTERIAL: 1 (ref -3–3)
BASE EXCESS ARTERIAL: 3 (ref -3–3)
BASE EXCESS ARTERIAL: 4 (ref -3–3)
BASE EXCESS ARTERIAL: 5 (ref -3–3)
BASE EXCESS ARTERIAL: 6 (ref -3–3)
BASE EXCESS ARTERIAL: 7 (ref -3–3)
BASE EXCESS ARTERIAL: 8 (ref -3–3)
BASE EXCESS VENOUS: 7.6 MMOL/L (ref -2–3)
BASOPHILS ABSOLUTE: 0 K/UL (ref 0–0.2)
BASOPHILS ABSOLUTE: 0.1 K/UL (ref 0–0.2)
BASOPHILS ABSOLUTE: 0.2 K/UL (ref 0–0.2)
BASOPHILS ABSOLUTE: 0.2 K/UL (ref 0–0.2)
BASOPHILS RELATIVE PERCENT: 0 %
BASOPHILS RELATIVE PERCENT: 0.5 %
BASOPHILS RELATIVE PERCENT: 0.9 %
BASOPHILS RELATIVE PERCENT: 2.8 %
BUN BLDV-MCNC: 18 MG/DL (ref 7–20)
BUN BLDV-MCNC: 19 MG/DL (ref 7–20)
BUN BLDV-MCNC: 21 MG/DL (ref 7–20)
BUN BLDV-MCNC: 24 MG/DL (ref 7–20)
BUN BLDV-MCNC: 27 MG/DL (ref 7–20)
BUN BLDV-MCNC: 27 MG/DL (ref 7–20)
BUN BLDV-MCNC: 29 MG/DL (ref 7–20)
BUN BLDV-MCNC: 30 MG/DL (ref 7–20)
BUN BLDV-MCNC: 34 MG/DL (ref 7–20)
CALCIUM IONIZED: 0.99 MMOL/L (ref 1.12–1.32)
CALCIUM IONIZED: 1.03 MMOL/L (ref 1.12–1.32)
CALCIUM IONIZED: 1.11 MMOL/L (ref 1.12–1.32)
CALCIUM IONIZED: 1.11 MMOL/L (ref 1.12–1.32)
CALCIUM IONIZED: 1.16 MMOL/L (ref 1.12–1.32)
CALCIUM SERPL-MCNC: 10.6 MG/DL (ref 8.3–10.6)
CALCIUM SERPL-MCNC: 8.3 MG/DL (ref 8.3–10.6)
CALCIUM SERPL-MCNC: 8.4 MG/DL (ref 8.3–10.6)
CALCIUM SERPL-MCNC: 8.5 MG/DL (ref 8.3–10.6)
CALCIUM SERPL-MCNC: 8.5 MG/DL (ref 8.3–10.6)
CALCIUM SERPL-MCNC: 8.9 MG/DL (ref 8.3–10.6)
CALCIUM SERPL-MCNC: 9.2 MG/DL (ref 8.3–10.6)
CALCIUM SERPL-MCNC: 9.2 MG/DL (ref 8.3–10.6)
CALCIUM SERPL-MCNC: 9.3 MG/DL (ref 8.3–10.6)
CARBOXYHEMOGLOBIN: 2.5 % (ref 0–1.5)
CHLORIDE BLD-SCNC: 90 MMOL/L (ref 99–110)
CHLORIDE BLD-SCNC: 92 MMOL/L (ref 99–110)
CHLORIDE BLD-SCNC: 93 MMOL/L (ref 99–110)
CHLORIDE BLD-SCNC: 94 MMOL/L (ref 99–110)
CHLORIDE BLD-SCNC: 95 MMOL/L (ref 99–110)
CHLORIDE BLD-SCNC: 96 MMOL/L (ref 99–110)
CHLORIDE BLD-SCNC: 96 MMOL/L (ref 99–110)
CHLORIDE BLD-SCNC: 97 MMOL/L (ref 99–110)
CHLORIDE BLD-SCNC: 97 MMOL/L (ref 99–110)
CO2: 12 MMOL/L (ref 21–32)
CO2: 15 MMOL/L (ref 21–32)
CO2: 21 MMOL/L (ref 21–32)
CO2: 22 MMOL/L (ref 21–32)
CO2: 23 MMOL/L (ref 21–32)
CO2: 25 MMOL/L (ref 21–32)
CO2: 30 MMOL/L (ref 21–32)
CREAT SERPL-MCNC: 3.1 MG/DL (ref 0.6–1.2)
CREAT SERPL-MCNC: 3.2 MG/DL (ref 0.6–1.2)
CREAT SERPL-MCNC: 3.4 MG/DL (ref 0.6–1.2)
CREAT SERPL-MCNC: 4 MG/DL (ref 0.6–1.2)
CREAT SERPL-MCNC: 4.5 MG/DL (ref 0.6–1.2)
CREAT SERPL-MCNC: 4.5 MG/DL (ref 0.6–1.2)
CREAT SERPL-MCNC: 5.5 MG/DL (ref 0.6–1.2)
CREAT SERPL-MCNC: 6 MG/DL (ref 0.6–1.2)
CREAT SERPL-MCNC: 6.1 MG/DL (ref 0.6–1.2)
EKG ATRIAL RATE: 80 BPM
EKG ATRIAL RATE: 81 BPM
EKG DIAGNOSIS: NORMAL
EKG DIAGNOSIS: NORMAL
EKG P AXIS: -10 DEGREES
EKG P AXIS: 8 DEGREES
EKG P-R INTERVAL: 152 MS
EKG P-R INTERVAL: 168 MS
EKG Q-T INTERVAL: 396 MS
EKG Q-T INTERVAL: 432 MS
EKG QRS DURATION: 106 MS
EKG QRS DURATION: 94 MS
EKG QTC CALCULATION (BAZETT): 456 MS
EKG QTC CALCULATION (BAZETT): 501 MS
EKG R AXIS: -33 DEGREES
EKG R AXIS: -68 DEGREES
EKG T AXIS: -17 DEGREES
EKG T AXIS: 57 DEGREES
EKG VENTRICULAR RATE: 80 BPM
EKG VENTRICULAR RATE: 81 BPM
EOSINOPHILS ABSOLUTE: 0 K/UL (ref 0–0.6)
EOSINOPHILS ABSOLUTE: 0 K/UL (ref 0–0.6)
EOSINOPHILS ABSOLUTE: 0.1 K/UL (ref 0–0.6)
EOSINOPHILS ABSOLUTE: 0.2 K/UL (ref 0–0.6)
EOSINOPHILS RELATIVE PERCENT: 0 %
EOSINOPHILS RELATIVE PERCENT: 0 %
EOSINOPHILS RELATIVE PERCENT: 0.3 %
EOSINOPHILS RELATIVE PERCENT: 2.6 %
GFR AFRICAN AMERICAN: 10
GFR AFRICAN AMERICAN: 12
GFR AFRICAN AMERICAN: 12
GFR AFRICAN AMERICAN: 14
GFR AFRICAN AMERICAN: 17
GFR AFRICAN AMERICAN: 18
GFR AFRICAN AMERICAN: 19
GFR AFRICAN AMERICAN: 8
GFR AFRICAN AMERICAN: 9
GFR AFRICAN AMERICAN: 9
GFR NON-AFRICAN AMERICAN: 10
GFR NON-AFRICAN AMERICAN: 10
GFR NON-AFRICAN AMERICAN: 11
GFR NON-AFRICAN AMERICAN: 14
GFR NON-AFRICAN AMERICAN: 15
GFR NON-AFRICAN AMERICAN: 15
GFR NON-AFRICAN AMERICAN: 7
GFR NON-AFRICAN AMERICAN: 8
GLUCOSE BLD-MCNC: 107 MG/DL (ref 70–99)
GLUCOSE BLD-MCNC: 155 MG/DL (ref 70–99)
GLUCOSE BLD-MCNC: 156 MG/DL (ref 70–99)
GLUCOSE BLD-MCNC: 174 MG/DL (ref 70–99)
GLUCOSE BLD-MCNC: 175 MG/DL (ref 70–99)
GLUCOSE BLD-MCNC: 181 MG/DL (ref 70–99)
GLUCOSE BLD-MCNC: 190 MG/DL (ref 70–99)
GLUCOSE BLD-MCNC: 190 MG/DL (ref 70–99)
GLUCOSE BLD-MCNC: 198 MG/DL (ref 70–99)
GLUCOSE BLD-MCNC: 199 MG/DL (ref 70–99)
GLUCOSE BLD-MCNC: 199 MG/DL (ref 70–99)
GLUCOSE BLD-MCNC: 205 MG/DL (ref 70–99)
GLUCOSE BLD-MCNC: 207 MG/DL (ref 70–99)
GLUCOSE BLD-MCNC: 209 MG/DL (ref 70–99)
GLUCOSE BLD-MCNC: 211 MG/DL (ref 70–99)
GLUCOSE BLD-MCNC: 213 MG/DL (ref 70–99)
GLUCOSE BLD-MCNC: 224 MG/DL (ref 70–99)
GLUCOSE BLD-MCNC: 235 MG/DL (ref 70–99)
GLUCOSE BLD-MCNC: 239 MG/DL (ref 70–99)
GLUCOSE BLD-MCNC: 281 MG/DL (ref 70–99)
GLUCOSE BLD-MCNC: 283 MG/DL (ref 70–99)
GLUCOSE BLD-MCNC: 286 MG/DL (ref 70–99)
GLUCOSE BLD-MCNC: 99 MG/DL (ref 70–99)
HBV SURFACE AB TITR SER: <3.5 MIU/ML
HCO3 ARTERIAL: 25.6 MMOL/L (ref 21–29)
HCO3 ARTERIAL: 27.6 MMOL/L (ref 21–29)
HCO3 ARTERIAL: 29.3 MMOL/L (ref 21–29)
HCO3 ARTERIAL: 29.8 MMOL/L (ref 21–29)
HCO3 ARTERIAL: 31.1 MMOL/L (ref 21–29)
HCO3 ARTERIAL: 31.4 MMOL/L (ref 21–29)
HCO3 ARTERIAL: 34.1 MMOL/L (ref 21–29)
HCO3 VENOUS: 30.6 MMOL/L (ref 24–28)
HCT VFR BLD CALC: 28.3 % (ref 36–48)
HCT VFR BLD CALC: 28.4 % (ref 36–48)
HCT VFR BLD CALC: 29.8 % (ref 36–48)
HCT VFR BLD CALC: 30.5 % (ref 36–48)
HCT VFR BLD CALC: 31.7 % (ref 36–48)
HEMOGLOBIN, VEN, REDUCED: 21.3 %
HEMOGLOBIN: 10.2 G/DL (ref 12–16)
HEMOGLOBIN: 10.9 G/DL (ref 12–16)
HEMOGLOBIN: 9 G/DL (ref 12–16)
HEMOGLOBIN: 9.2 G/DL (ref 12–16)
HEMOGLOBIN: 9.2 G/DL (ref 12–16)
HEPATITIS B SURFACE ANTIGEN INTERPRETATION: NORMAL
INR BLD: 1.03 (ref 0.86–1.14)
INR BLD: 1.08 (ref 0.86–1.14)
INR BLD: 1.49 (ref 0.86–1.14)
LACTIC ACID: 1.4 MMOL/L (ref 0.4–2)
LACTIC ACID: 17.9 MMOL/L (ref 0.4–2)
LYMPHOCYTES ABSOLUTE: 0.6 K/UL (ref 1–5.1)
LYMPHOCYTES ABSOLUTE: 0.6 K/UL (ref 1–5.1)
LYMPHOCYTES ABSOLUTE: 0.8 K/UL (ref 1–5.1)
LYMPHOCYTES ABSOLUTE: 0.9 K/UL (ref 1–5.1)
LYMPHOCYTES RELATIVE PERCENT: 11.8 %
LYMPHOCYTES RELATIVE PERCENT: 3 %
LYMPHOCYTES RELATIVE PERCENT: 4.3 %
LYMPHOCYTES RELATIVE PERCENT: 4.5 %
MAGNESIUM: 2.2 MG/DL (ref 1.8–2.4)
MAGNESIUM: 2.3 MG/DL (ref 1.8–2.4)
MAGNESIUM: 2.4 MG/DL (ref 1.8–2.4)
MAGNESIUM: 2.5 MG/DL (ref 1.8–2.4)
MCH RBC QN AUTO: 32 PG (ref 26–34)
MCH RBC QN AUTO: 32.2 PG (ref 26–34)
MCH RBC QN AUTO: 32.5 PG (ref 26–34)
MCH RBC QN AUTO: 33 PG (ref 26–34)
MCHC RBC AUTO-ENTMCNC: 30.2 G/DL (ref 31–36)
MCHC RBC AUTO-ENTMCNC: 32.5 G/DL (ref 31–36)
MCHC RBC AUTO-ENTMCNC: 33.5 G/DL (ref 31–36)
MCHC RBC AUTO-ENTMCNC: 34.3 G/DL (ref 31–36)
MCV RBC AUTO: 106.5 FL (ref 80–100)
MCV RBC AUTO: 96 FL (ref 80–100)
MCV RBC AUTO: 97.1 FL (ref 80–100)
MCV RBC AUTO: 98.5 FL (ref 80–100)
METAMYELOCYTES RELATIVE PERCENT: 1 %
METHEMOGLOBIN VENOUS: 0.2 % (ref 0–1.5)
MONOCYTES ABSOLUTE: 0.9 K/UL (ref 0–1.3)
MONOCYTES ABSOLUTE: 1.1 K/UL (ref 0–1.3)
MONOCYTES ABSOLUTE: 1.9 K/UL (ref 0–1.3)
MONOCYTES ABSOLUTE: 2.6 K/UL (ref 0–1.3)
MONOCYTES RELATIVE PERCENT: 10.8 %
MONOCYTES RELATIVE PERCENT: 12 %
MONOCYTES RELATIVE PERCENT: 12.9 %
MONOCYTES RELATIVE PERCENT: 8.7 %
NEUTROPHILS ABSOLUTE: 11.1 K/UL (ref 1.7–7.7)
NEUTROPHILS ABSOLUTE: 15 K/UL (ref 1.7–7.7)
NEUTROPHILS ABSOLUTE: 18.3 K/UL (ref 1.7–7.7)
NEUTROPHILS ABSOLUTE: 5.1 K/UL (ref 1.7–7.7)
NEUTROPHILS RELATIVE PERCENT: 69.9 %
NEUTROPHILS RELATIVE PERCENT: 77 %
NEUTROPHILS RELATIVE PERCENT: 83.5 %
NEUTROPHILS RELATIVE PERCENT: 86.5 %
O2 SAT, ARTERIAL: 100 % (ref 93–100)
O2 SAT, ARTERIAL: 91 % (ref 93–100)
O2 SAT, ARTERIAL: 94 % (ref 93–100)
O2 SAT, ARTERIAL: 95 % (ref 93–100)
O2 SAT, ARTERIAL: 95 % (ref 93–100)
O2 SAT, ARTERIAL: 98 % (ref 93–100)
O2 SAT, ARTERIAL: 98 % (ref 93–100)
O2 SAT, VEN: 78 %
PCO2 ARTERIAL: 42 MM HG (ref 35–45)
PCO2 ARTERIAL: 44.6 MM HG (ref 35–45)
PCO2 ARTERIAL: 44.9 MM HG (ref 35–45)
PCO2 ARTERIAL: 47.6 MM HG (ref 35–45)
PCO2 ARTERIAL: 51.5 MM HG (ref 35–45)
PCO2 ARTERIAL: 58.3 MM HG (ref 35–45)
PCO2 ARTERIAL: 68.2 MM HG (ref 35–45)
PCO2, VEN: 36.7 MMHG (ref 41–51)
PDW BLD-RTO: 15 % (ref 12.4–15.4)
PDW BLD-RTO: 15.3 % (ref 12.4–15.4)
PDW BLD-RTO: 15.4 % (ref 12.4–15.4)
PDW BLD-RTO: 16.4 % (ref 12.4–15.4)
PERFORMED ON: ABNORMAL
PERFORMED ON: NORMAL
PH ARTERIAL: 7.31 (ref 7.35–7.45)
PH ARTERIAL: 7.34 (ref 7.35–7.45)
PH ARTERIAL: 7.37 (ref 7.35–7.45)
PH ARTERIAL: 7.39 (ref 7.35–7.45)
PH ARTERIAL: 7.4 (ref 7.35–7.45)
PH ARTERIAL: 7.4 (ref 7.35–7.45)
PH ARTERIAL: 7.45 (ref 7.35–7.45)
PH VENOUS: 7.29 (ref 7.35–7.45)
PH VENOUS: 7.37 (ref 7.35–7.45)
PH VENOUS: 7.39 (ref 7.35–7.45)
PH VENOUS: 7.53 (ref 7.35–7.45)
PHOSPHORUS: 5.1 MG/DL (ref 2.5–4.9)
PHOSPHORUS: 5.6 MG/DL (ref 2.5–4.9)
PHOSPHORUS: 5.8 MG/DL (ref 2.5–4.9)
PHOSPHORUS: 6.3 MG/DL (ref 2.5–4.9)
PHOSPHORUS: 6.9 MG/DL (ref 2.5–4.9)
PHOSPHORUS: 7.2 MG/DL (ref 2.5–4.9)
PLATELET # BLD: 381 K/UL (ref 135–450)
PLATELET # BLD: 390 K/UL (ref 135–450)
PLATELET # BLD: 403 K/UL (ref 135–450)
PLATELET # BLD: 453 K/UL (ref 135–450)
PMV BLD AUTO: 7.4 FL (ref 5–10.5)
PMV BLD AUTO: 7.8 FL (ref 5–10.5)
PMV BLD AUTO: 7.9 FL (ref 5–10.5)
PMV BLD AUTO: 8.3 FL (ref 5–10.5)
PO2 ARTERIAL: 100.2 MM HG (ref 75–108)
PO2 ARTERIAL: 105.7 MM HG (ref 75–108)
PO2 ARTERIAL: 236.6 MM HG (ref 75–108)
PO2 ARTERIAL: 68.3 MM HG (ref 75–108)
PO2 ARTERIAL: 74.1 MM HG (ref 75–108)
PO2 ARTERIAL: 74.5 MM HG (ref 75–108)
PO2 ARTERIAL: 75.2 MM HG (ref 75–108)
PO2, VEN: 40.5 MMHG (ref 25–40)
POC ACT LR: 143 SEC
POC ACT LR: 151 SEC
POC ACT LR: 179 SEC
POC ACT LR: 189 SEC
POC ACT LR: 198 SEC
POC ACT LR: >400 SEC
POC CREATININE: 5.8 MG/DL (ref 0.6–1.2)
POC PATIENT TEMP: 37
POC POTASSIUM: 4.2 MMOL/L (ref 3.5–5.1)
POC SAMPLE TYPE: ABNORMAL
POC SAMPLE TYPE: NORMAL
POC SODIUM: 136 MMOL/L (ref 136–145)
POTASSIUM REFLEX MAGNESIUM: 4.4 MMOL/L (ref 3.5–5.1)
POTASSIUM REFLEX MAGNESIUM: 5.1 MMOL/L (ref 3.5–5.1)
POTASSIUM SERPL-SCNC: 4.9 MMOL/L (ref 3.5–5.1)
POTASSIUM SERPL-SCNC: 4.9 MMOL/L (ref 3.5–5.1)
POTASSIUM SERPL-SCNC: 5 MMOL/L (ref 3.5–5.1)
POTASSIUM SERPL-SCNC: 5.1 MMOL/L (ref 3.5–5.1)
POTASSIUM SERPL-SCNC: 5.1 MMOL/L (ref 3.5–5.1)
POTASSIUM SERPL-SCNC: 5.3 MMOL/L (ref 3.5–5.1)
POTASSIUM SERPL-SCNC: 5.8 MMOL/L (ref 3.5–5.1)
PRO-BNP: ABNORMAL PG/ML (ref 0–124)
PROTHROMBIN TIME: 12 SEC (ref 10–13.2)
PROTHROMBIN TIME: 12.5 SEC (ref 10–13.2)
PROTHROMBIN TIME: 17.4 SEC (ref 10–13.2)
RBC # BLD: 2.8 M/UL (ref 4–5.2)
RBC # BLD: 2.87 M/UL (ref 4–5.2)
RBC # BLD: 3.15 M/UL (ref 4–5.2)
RBC # BLD: 3.3 M/UL (ref 4–5.2)
SODIUM BLD-SCNC: 134 MMOL/L (ref 136–145)
SODIUM BLD-SCNC: 135 MMOL/L (ref 136–145)
SODIUM BLD-SCNC: 135 MMOL/L (ref 136–145)
SODIUM BLD-SCNC: 136 MMOL/L (ref 136–145)
SODIUM BLD-SCNC: 137 MMOL/L (ref 136–145)
SODIUM BLD-SCNC: 139 MMOL/L (ref 136–145)
SODIUM BLD-SCNC: 140 MMOL/L (ref 136–145)
TCO2 ARTERIAL: 27 MMOL/L
TCO2 ARTERIAL: 29 MMOL/L
TCO2 ARTERIAL: 31 MMOL/L
TCO2 ARTERIAL: 31 MMOL/L
TCO2 ARTERIAL: 32 MMOL/L
TCO2 ARTERIAL: 33 MMOL/L
TCO2 ARTERIAL: 36 MMOL/L
TCO2 CALC VENOUS: 32 MMOL/L
TROPONIN: 0.24 NG/ML
VANCOMYCIN TROUGH: 18.5
WBC # BLD: 12.8 K/UL (ref 4–11)
WBC # BLD: 18 K/UL (ref 4–11)
WBC # BLD: 21.5 K/UL (ref 4–11)
WBC # BLD: 7.4 K/UL (ref 4–11)

## 2020-01-01 PROCEDURE — 94003 VENT MGMT INPAT SUBQ DAY: CPT

## 2020-01-01 PROCEDURE — 2709999900 HC NON-CHARGEABLE SUPPLY

## 2020-01-01 PROCEDURE — 2000000000 HC ICU R&B

## 2020-01-01 PROCEDURE — 11042 DBRDMT SUBQ TIS 1ST 20SQCM/<: CPT

## 2020-01-01 PROCEDURE — 2500000003 HC RX 250 WO HCPCS: Performed by: STUDENT IN AN ORGANIZED HEALTH CARE EDUCATION/TRAINING PROGRAM

## 2020-01-01 PROCEDURE — 85610 PROTHROMBIN TIME: CPT

## 2020-01-01 PROCEDURE — 02703ZZ DILATION OF CORONARY ARTERY, ONE ARTERY, PERCUTANEOUS APPROACH: ICD-10-PCS | Performed by: INTERNAL MEDICINE

## 2020-01-01 PROCEDURE — 36415 COLL VENOUS BLD VENIPUNCTURE: CPT

## 2020-01-01 PROCEDURE — 0BH17EZ INSERTION OF ENDOTRACHEAL AIRWAY INTO TRACHEA, VIA NATURAL OR ARTIFICIAL OPENING: ICD-10-PCS | Performed by: INTERNAL MEDICINE

## 2020-01-01 PROCEDURE — 6360000002 HC RX W HCPCS

## 2020-01-01 PROCEDURE — C1725 CATH, TRANSLUMIN NON-LASER: HCPCS

## 2020-01-01 PROCEDURE — 82803 BLOOD GASES ANY COMBINATION: CPT

## 2020-01-01 PROCEDURE — 99233 SBSQ HOSP IP/OBS HIGH 50: CPT | Performed by: INTERNAL MEDICINE

## 2020-01-01 PROCEDURE — 99213 OFFICE O/P EST LOW 20 MIN: CPT

## 2020-01-01 PROCEDURE — 6360000002 HC RX W HCPCS: Performed by: INTERNAL MEDICINE

## 2020-01-01 PROCEDURE — 80048 BASIC METABOLIC PNL TOTAL CA: CPT

## 2020-01-01 PROCEDURE — 5A1945Z RESPIRATORY VENTILATION, 24-96 CONSECUTIVE HOURS: ICD-10-PCS | Performed by: INTERNAL MEDICINE

## 2020-01-01 PROCEDURE — 84100 ASSAY OF PHOSPHORUS: CPT

## 2020-01-01 PROCEDURE — 2500000003 HC RX 250 WO HCPCS: Performed by: INTERNAL MEDICINE

## 2020-01-01 PROCEDURE — 31500 INSERT EMERGENCY AIRWAY: CPT

## 2020-01-01 PROCEDURE — 71045 X-RAY EXAM CHEST 1 VIEW: CPT

## 2020-01-01 PROCEDURE — 99291 CRITICAL CARE FIRST HOUR: CPT | Performed by: INTERNAL MEDICINE

## 2020-01-01 PROCEDURE — 6360000002 HC RX W HCPCS: Performed by: STUDENT IN AN ORGANIZED HEALTH CARE EDUCATION/TRAINING PROGRAM

## 2020-01-01 PROCEDURE — 73630 X-RAY EXAM OF FOOT: CPT

## 2020-01-01 PROCEDURE — 99203 OFFICE O/P NEW LOW 30 MIN: CPT | Performed by: SURGERY

## 2020-01-01 PROCEDURE — 84295 ASSAY OF SERUM SODIUM: CPT

## 2020-01-01 PROCEDURE — 2500000003 HC RX 250 WO HCPCS

## 2020-01-01 PROCEDURE — 36556 INSERT NON-TUNNEL CV CATH: CPT

## 2020-01-01 PROCEDURE — 85730 THROMBOPLASTIN TIME PARTIAL: CPT

## 2020-01-01 PROCEDURE — 11042 DBRDMT SUBQ TIS 1ST 20SQCM/<: CPT | Performed by: SPECIALIST

## 2020-01-01 PROCEDURE — 02JA3ZZ INSPECTION OF HEART, PERCUTANEOUS APPROACH: ICD-10-PCS | Performed by: INTERNAL MEDICINE

## 2020-01-01 PROCEDURE — 82947 ASSAY GLUCOSE BLOOD QUANT: CPT

## 2020-01-01 PROCEDURE — 90945 DIALYSIS ONE EVALUATION: CPT

## 2020-01-01 PROCEDURE — 11045 DBRDMT SUBQ TISS EACH ADDL: CPT | Performed by: PODIATRIST

## 2020-01-01 PROCEDURE — 80069 RENAL FUNCTION PANEL: CPT

## 2020-01-01 PROCEDURE — 11045 DBRDMT SUBQ TISS EACH ADDL: CPT

## 2020-01-01 PROCEDURE — 99152 MOD SED SAME PHYS/QHP 5/>YRS: CPT

## 2020-01-01 PROCEDURE — 6370000000 HC RX 637 (ALT 250 FOR IP): Performed by: INTERNAL MEDICINE

## 2020-01-01 PROCEDURE — 6370000000 HC RX 637 (ALT 250 FOR IP): Performed by: PODIATRIST

## 2020-01-01 PROCEDURE — 94150 VITAL CAPACITY TEST: CPT

## 2020-01-01 PROCEDURE — C1874 STENT, COATED/COV W/DEL SYS: HCPCS

## 2020-01-01 PROCEDURE — 6370000000 HC RX 637 (ALT 250 FOR IP): Performed by: SURGERY

## 2020-01-01 PROCEDURE — 94770 HC ETCO2 MONITOR DAILY: CPT

## 2020-01-01 PROCEDURE — 2580000003 HC RX 258

## 2020-01-01 PROCEDURE — 85018 HEMOGLOBIN: CPT

## 2020-01-01 PROCEDURE — 90935 HEMODIALYSIS ONE EVALUATION: CPT

## 2020-01-01 PROCEDURE — 85014 HEMATOCRIT: CPT

## 2020-01-01 PROCEDURE — 99223 1ST HOSP IP/OBS HIGH 75: CPT | Performed by: INTERNAL MEDICINE

## 2020-01-01 PROCEDURE — 93458 L HRT ARTERY/VENTRICLE ANGIO: CPT

## 2020-01-01 PROCEDURE — 93005 ELECTROCARDIOGRAM TRACING: CPT | Performed by: STUDENT IN AN ORGANIZED HEALTH CARE EDUCATION/TRAINING PROGRAM

## 2020-01-01 PROCEDURE — 83880 ASSAY OF NATRIURETIC PEPTIDE: CPT

## 2020-01-01 PROCEDURE — 85520 HEPARIN ASSAY: CPT

## 2020-01-01 PROCEDURE — 85347 COAGULATION TIME ACTIVATED: CPT

## 2020-01-01 PROCEDURE — 2700000000 HC OXYGEN THERAPY PER DAY

## 2020-01-01 PROCEDURE — 36592 COLLECT BLOOD FROM PICC: CPT

## 2020-01-01 PROCEDURE — 6370000000 HC RX 637 (ALT 250 FOR IP)

## 2020-01-01 PROCEDURE — 94761 N-INVAS EAR/PLS OXIMETRY MLT: CPT

## 2020-01-01 PROCEDURE — 85025 COMPLETE CBC W/AUTO DIFF WBC: CPT

## 2020-01-01 PROCEDURE — 37799 UNLISTED PX VASCULAR SURGERY: CPT

## 2020-01-01 PROCEDURE — 87340 HEPATITIS B SURFACE AG IA: CPT

## 2020-01-01 PROCEDURE — C1769 GUIDE WIRE: HCPCS

## 2020-01-01 PROCEDURE — 82565 ASSAY OF CREATININE: CPT

## 2020-01-01 PROCEDURE — 84484 ASSAY OF TROPONIN QUANT: CPT

## 2020-01-01 PROCEDURE — 94002 VENT MGMT INPAT INIT DAY: CPT

## 2020-01-01 PROCEDURE — 86706 HEP B SURFACE ANTIBODY: CPT

## 2020-01-01 PROCEDURE — 6370000000 HC RX 637 (ALT 250 FOR IP): Performed by: SPECIALIST

## 2020-01-01 PROCEDURE — B2111ZZ FLUOROSCOPY OF MULTIPLE CORONARY ARTERIES USING LOW OSMOLAR CONTRAST: ICD-10-PCS | Performed by: INTERNAL MEDICINE

## 2020-01-01 PROCEDURE — 93458 L HRT ARTERY/VENTRICLE ANGIO: CPT | Performed by: INTERNAL MEDICINE

## 2020-01-01 PROCEDURE — 4A023N7 MEASUREMENT OF CARDIAC SAMPLING AND PRESSURE, LEFT HEART, PERCUTANEOUS APPROACH: ICD-10-PCS | Performed by: INTERNAL MEDICINE

## 2020-01-01 PROCEDURE — 92920 PRQ TRLUML C ANGIOP 1ART&/BR: CPT | Performed by: INTERNAL MEDICINE

## 2020-01-01 PROCEDURE — 82330 ASSAY OF CALCIUM: CPT

## 2020-01-01 PROCEDURE — 92941 PRQ TRLML REVSC TOT OCCL AMI: CPT

## 2020-01-01 PROCEDURE — 83605 ASSAY OF LACTIC ACID: CPT

## 2020-01-01 PROCEDURE — 84132 ASSAY OF SERUM POTASSIUM: CPT

## 2020-01-01 PROCEDURE — 99285 EMERGENCY DEPT VISIT HI MDM: CPT

## 2020-01-01 PROCEDURE — 83735 ASSAY OF MAGNESIUM: CPT

## 2020-01-01 PROCEDURE — 94750 HC PULMONARY COMPLIANCE STUDY: CPT

## 2020-01-01 PROCEDURE — 5A1D70Z PERFORMANCE OF URINARY FILTRATION, INTERMITTENT, LESS THAN 6 HOURS PER DAY: ICD-10-PCS | Performed by: INTERNAL MEDICINE

## 2020-01-01 PROCEDURE — 02HV33Z INSERTION OF INFUSION DEVICE INTO SUPERIOR VENA CAVA, PERCUTANEOUS APPROACH: ICD-10-PCS | Performed by: STUDENT IN AN ORGANIZED HEALTH CARE EDUCATION/TRAINING PROGRAM

## 2020-01-01 PROCEDURE — 2580000003 HC RX 258: Performed by: INTERNAL MEDICINE

## 2020-01-01 PROCEDURE — 99215 OFFICE O/P EST HI 40 MIN: CPT | Performed by: INTERNAL MEDICINE

## 2020-01-01 PROCEDURE — C1887 CATHETER, GUIDING: HCPCS

## 2020-01-01 PROCEDURE — 6370000000 HC RX 637 (ALT 250 FOR IP): Performed by: STUDENT IN AN ORGANIZED HEALTH CARE EDUCATION/TRAINING PROGRAM

## 2020-01-01 PROCEDURE — 93010 ELECTROCARDIOGRAM REPORT: CPT | Performed by: INTERNAL MEDICINE

## 2020-01-01 PROCEDURE — 2580000003 HC RX 258: Performed by: STUDENT IN AN ORGANIZED HEALTH CARE EDUCATION/TRAINING PROGRAM

## 2020-01-01 PROCEDURE — 99153 MOD SED SAME PHYS/QHP EA: CPT

## 2020-01-01 PROCEDURE — 11045 DBRDMT SUBQ TISS EACH ADDL: CPT | Performed by: SPECIALIST

## 2020-01-01 PROCEDURE — 93005 ELECTROCARDIOGRAM TRACING: CPT | Performed by: EMERGENCY MEDICINE

## 2020-01-01 PROCEDURE — C1894 INTRO/SHEATH, NON-LASER: HCPCS

## 2020-01-01 PROCEDURE — 99024 POSTOP FOLLOW-UP VISIT: CPT | Performed by: ORTHOPAEDIC SURGERY

## 2020-01-01 RX ORDER — POTASSIUM CHLORIDE 29.8 MG/ML
20 INJECTION INTRAVENOUS PRN
Status: DISCONTINUED | OUTPATIENT
Start: 2020-01-01 | End: 2020-01-01 | Stop reason: HOSPADM

## 2020-01-01 RX ORDER — OXYCODONE AND ACETAMINOPHEN 7.5; 325 MG/1; MG/1
1 TABLET ORAL EVERY 6 HOURS PRN
Status: DISCONTINUED | OUTPATIENT
Start: 2020-01-01 | End: 2020-01-01 | Stop reason: HOSPADM

## 2020-01-01 RX ORDER — AMIODARONE HYDROCHLORIDE 50 MG/ML
INJECTION, SOLUTION INTRAVENOUS
Status: COMPLETED | OUTPATIENT
Start: 2020-01-01 | End: 2020-01-01

## 2020-01-01 RX ORDER — SODIUM CHLORIDE 9 MG/ML
INJECTION, SOLUTION INTRAVENOUS
Status: COMPLETED
Start: 2020-01-01 | End: 2020-01-01

## 2020-01-01 RX ORDER — OXYCODONE HYDROCHLORIDE 5 MG/1
10 CAPSULE ORAL EVERY 4 HOURS PRN
Status: ON HOLD | COMMUNITY
End: 2020-01-01 | Stop reason: CLARIF

## 2020-01-01 RX ORDER — PENICILLIN V POTASSIUM 500 MG/1
500 TABLET ORAL 2 TIMES DAILY
Qty: 180 TABLET | Refills: 3 | Status: ON HOLD | OUTPATIENT
Start: 2020-01-01 | End: 2020-01-01 | Stop reason: HOSPADM

## 2020-01-01 RX ORDER — LIDOCAINE HYDROCHLORIDE 40 MG/ML
2.5 SOLUTION TOPICAL ONCE
Status: COMPLETED | OUTPATIENT
Start: 2020-01-01 | End: 2020-01-01

## 2020-01-01 RX ORDER — POLYETHYLENE GLYCOL 3350 17 G/17G
17 POWDER, FOR SOLUTION ORAL DAILY PRN
Status: DISCONTINUED | OUTPATIENT
Start: 2020-01-01 | End: 2020-01-01 | Stop reason: HOSPADM

## 2020-01-01 RX ORDER — COLLAGENASE SANTYL 250 [ARB'U]/G
1 OINTMENT TOPICAL DAILY
COMMUNITY
Start: 2019-01-01 | End: 2020-01-01

## 2020-01-01 RX ORDER — ACETAMINOPHEN 325 MG/1
650 TABLET ORAL EVERY 6 HOURS PRN
Status: DISCONTINUED | OUTPATIENT
Start: 2020-01-01 | End: 2020-01-01 | Stop reason: HOSPADM

## 2020-01-01 RX ORDER — HEPARIN SODIUM 10000 [USP'U]/100ML
INJECTION, SOLUTION INTRAVENOUS
Status: DISPENSED
Start: 2020-01-01 | End: 2020-01-01

## 2020-01-01 RX ORDER — INSULIN LISPRO 100 [IU]/ML
0-6 INJECTION, SOLUTION INTRAVENOUS; SUBCUTANEOUS NIGHTLY
Status: DISCONTINUED | OUTPATIENT
Start: 2020-01-01 | End: 2020-01-01 | Stop reason: SDUPTHER

## 2020-01-01 RX ORDER — POLYETHYLENE GLYCOL 3350 17 G/17G
17 POWDER, FOR SOLUTION ORAL 2 TIMES DAILY PRN
Status: ON HOLD | COMMUNITY
End: 2020-01-01 | Stop reason: CLARIF

## 2020-01-01 RX ORDER — ESCITALOPRAM OXALATE 10 MG/1
10 TABLET ORAL DAILY
Status: ON HOLD | COMMUNITY
End: 2020-01-01 | Stop reason: HOSPADM

## 2020-01-01 RX ORDER — PROPOFOL 10 MG/ML
10 INJECTION, EMULSION INTRAVENOUS
Status: DISCONTINUED | OUTPATIENT
Start: 2020-01-01 | End: 2020-01-01

## 2020-01-01 RX ORDER — NICOTINE POLACRILEX 4 MG
15 LOZENGE BUCCAL PRN
Status: DISCONTINUED | OUTPATIENT
Start: 2020-01-01 | End: 2020-01-01 | Stop reason: HOSPADM

## 2020-01-01 RX ORDER — LIDOCAINE HYDROCHLORIDE 40 MG/ML
SOLUTION TOPICAL ONCE
Status: COMPLETED | OUTPATIENT
Start: 2020-01-01 | End: 2020-01-01

## 2020-01-01 RX ORDER — BISACODYL 10 MG
10 SUPPOSITORY, RECTAL RECTAL DAILY PRN
Status: DISCONTINUED | OUTPATIENT
Start: 2020-01-01 | End: 2020-01-01 | Stop reason: HOSPADM

## 2020-01-01 RX ORDER — AMINO ACIDS/PROTEIN HYDROLYS 15G-100/30
30 LIQUID (ML) ORAL 2 TIMES DAILY
Status: ON HOLD | COMMUNITY
End: 2020-01-01 | Stop reason: CLARIF

## 2020-01-01 RX ORDER — SODIUM CHLORIDE 0.9 % (FLUSH) 0.9 %
10 SYRINGE (ML) INJECTION PRN
Status: DISCONTINUED | OUTPATIENT
Start: 2020-01-01 | End: 2020-01-01 | Stop reason: SDUPTHER

## 2020-01-01 RX ORDER — HEPARIN SODIUM 5000 [USP'U]/ML
4000 INJECTION, SOLUTION INTRAVENOUS; SUBCUTANEOUS PRN
Status: DISCONTINUED | OUTPATIENT
Start: 2020-01-01 | End: 2020-01-01 | Stop reason: HOSPADM

## 2020-01-01 RX ORDER — OXYCODONE AND ACETAMINOPHEN 7.5; 325 MG/1; MG/1
1 TABLET ORAL EVERY 6 HOURS PRN
Status: ON HOLD | COMMUNITY
End: 2020-01-01 | Stop reason: HOSPADM

## 2020-01-01 RX ORDER — SODIUM CHLORIDE 0.9 % (FLUSH) 0.9 %
10 SYRINGE (ML) INJECTION EVERY 12 HOURS SCHEDULED
Status: DISCONTINUED | OUTPATIENT
Start: 2020-01-01 | End: 2020-01-01 | Stop reason: SDUPTHER

## 2020-01-01 RX ORDER — NYSTATIN 100000 U/G
OINTMENT TOPICAL 3 TIMES DAILY
Status: DISCONTINUED | OUTPATIENT
Start: 2020-01-01 | End: 2020-01-01 | Stop reason: ALTCHOICE

## 2020-01-01 RX ORDER — LIDOCAINE 4 G/G
1 PATCH TOPICAL DAILY
Status: DISCONTINUED | OUTPATIENT
Start: 2020-01-01 | End: 2020-01-01 | Stop reason: HOSPADM

## 2020-01-01 RX ORDER — ASPIRIN 81 MG/1
81 TABLET, CHEWABLE ORAL DAILY
Status: DISCONTINUED | OUTPATIENT
Start: 2020-01-01 | End: 2020-01-01

## 2020-01-01 RX ORDER — SODIUM CHLORIDE 9 MG/ML
INJECTION, SOLUTION INTRAVENOUS
Status: DISPENSED
Start: 2020-01-01 | End: 2020-01-01

## 2020-01-01 RX ORDER — SODIUM CHLORIDE 0.9 % (FLUSH) 0.9 %
10 SYRINGE (ML) INJECTION EVERY 12 HOURS SCHEDULED
Status: DISCONTINUED | OUTPATIENT
Start: 2020-01-01 | End: 2020-01-01 | Stop reason: HOSPADM

## 2020-01-01 RX ORDER — HEPARIN SODIUM 1000 [USP'U]/ML
1000 INJECTION, SOLUTION INTRAVENOUS; SUBCUTANEOUS
Status: DISPENSED | OUTPATIENT
Start: 2020-01-01 | End: 2020-01-01

## 2020-01-01 RX ORDER — PENICILLIN V POTASSIUM 500 MG/1
500 TABLET ORAL 2 TIMES DAILY
COMMUNITY
End: 2020-01-01 | Stop reason: SDUPTHER

## 2020-01-01 RX ORDER — DEXTROSE MONOHYDRATE 25 G/50ML
12.5 INJECTION, SOLUTION INTRAVENOUS PRN
Status: DISCONTINUED | OUTPATIENT
Start: 2020-01-01 | End: 2020-01-01 | Stop reason: HOSPADM

## 2020-01-01 RX ORDER — LORAZEPAM 2 MG/ML
0.5 INJECTION INTRAMUSCULAR ONCE
Status: COMPLETED | OUTPATIENT
Start: 2020-01-01 | End: 2020-01-01

## 2020-01-01 RX ORDER — HEPARIN SODIUM 10000 [USP'U]/100ML
17 INJECTION, SOLUTION INTRAVENOUS CONTINUOUS
Status: DISCONTINUED | OUTPATIENT
Start: 2020-01-01 | End: 2020-01-01 | Stop reason: HOSPADM

## 2020-01-01 RX ORDER — CHLORHEXIDINE GLUCONATE 0.12 MG/ML
15 RINSE ORAL 2 TIMES DAILY
Status: DISCONTINUED | OUTPATIENT
Start: 2020-01-01 | End: 2020-01-01 | Stop reason: HOSPADM

## 2020-01-01 RX ORDER — HEPARIN SODIUM 5000 [USP'U]/ML
2000 INJECTION, SOLUTION INTRAVENOUS; SUBCUTANEOUS PRN
Status: DISCONTINUED | OUTPATIENT
Start: 2020-01-01 | End: 2020-01-01 | Stop reason: HOSPADM

## 2020-01-01 RX ORDER — CALCIUM CHLORIDE 100 MG/ML
INJECTION INTRAVENOUS; INTRAVENTRICULAR
Status: COMPLETED | OUTPATIENT
Start: 2020-01-01 | End: 2020-01-01

## 2020-01-01 RX ORDER — LORAZEPAM 2 MG/ML
INJECTION INTRAMUSCULAR
Status: COMPLETED
Start: 2020-01-01 | End: 2020-01-01

## 2020-01-01 RX ORDER — BISACODYL 10 MG
10 SUPPOSITORY, RECTAL RECTAL DAILY PRN
Status: ON HOLD | COMMUNITY
End: 2020-01-01 | Stop reason: CLARIF

## 2020-01-01 RX ORDER — CEFAZOLIN SODIUM 2 G/50ML
2 SOLUTION INTRAVENOUS
Status: CANCELLED | OUTPATIENT
Start: 2020-04-13 | End: 2020-04-13

## 2020-01-01 RX ORDER — LIDOCAINE HYDROCHLORIDE 40 MG/ML
SOLUTION TOPICAL ONCE
Status: DISCONTINUED | OUTPATIENT
Start: 2020-01-01 | End: 2020-01-01 | Stop reason: HOSPADM

## 2020-01-01 RX ORDER — ONDANSETRON 4 MG/1
TABLET, FILM COATED ORAL
Status: ON HOLD | COMMUNITY
Start: 2020-01-01 | End: 2020-01-01 | Stop reason: HOSPADM

## 2020-01-01 RX ORDER — HEPARIN SODIUM 10000 [USP'U]/100ML
11 INJECTION, SOLUTION INTRAVENOUS CONTINUOUS
Status: DISCONTINUED | OUTPATIENT
Start: 2020-01-01 | End: 2020-01-01

## 2020-01-01 RX ORDER — BUMETANIDE 2 MG/1
2 TABLET ORAL 2 TIMES DAILY
Status: ON HOLD | COMMUNITY
Start: 2020-01-01 | End: 2020-01-01 | Stop reason: HOSPADM

## 2020-01-01 RX ORDER — ASPIRIN 81 MG/1
324 TABLET, CHEWABLE ORAL ONCE
Status: DISCONTINUED | OUTPATIENT
Start: 2020-01-01 | End: 2020-01-01 | Stop reason: HOSPADM

## 2020-01-01 RX ORDER — HEPARIN SODIUM 5000 [USP'U]/ML
4000 INJECTION, SOLUTION INTRAVENOUS; SUBCUTANEOUS ONCE
Status: COMPLETED | OUTPATIENT
Start: 2020-01-01 | End: 2020-01-01

## 2020-01-01 RX ORDER — DEXTROSE MONOHYDRATE 50 MG/ML
100 INJECTION, SOLUTION INTRAVENOUS PRN
Status: DISCONTINUED | OUTPATIENT
Start: 2020-01-01 | End: 2020-01-01 | Stop reason: HOSPADM

## 2020-01-01 RX ORDER — ONDANSETRON 2 MG/ML
4 INJECTION INTRAMUSCULAR; INTRAVENOUS EVERY 6 HOURS PRN
Status: DISCONTINUED | OUTPATIENT
Start: 2020-01-01 | End: 2020-01-01 | Stop reason: HOSPADM

## 2020-01-01 RX ORDER — ACETAMINOPHEN 650 MG/1
650 SUPPOSITORY RECTAL EVERY 6 HOURS PRN
Status: DISCONTINUED | OUTPATIENT
Start: 2020-01-01 | End: 2020-01-01 | Stop reason: HOSPADM

## 2020-01-01 RX ORDER — MAGNESIUM SULFATE 1 G/100ML
1 INJECTION INTRAVENOUS PRN
Status: DISCONTINUED | OUTPATIENT
Start: 2020-01-01 | End: 2020-01-01 | Stop reason: HOSPADM

## 2020-01-01 RX ORDER — PROMETHAZINE HYDROCHLORIDE 25 MG/1
12.5 TABLET ORAL EVERY 6 HOURS PRN
Status: DISCONTINUED | OUTPATIENT
Start: 2020-01-01 | End: 2020-01-01 | Stop reason: HOSPADM

## 2020-01-01 RX ORDER — SODIUM CHLORIDE 0.9 % (FLUSH) 0.9 %
10 SYRINGE (ML) INJECTION PRN
Status: DISCONTINUED | OUTPATIENT
Start: 2020-01-01 | End: 2020-01-01 | Stop reason: HOSPADM

## 2020-01-01 RX ORDER — SPIRONOLACTONE 25 MG/1
TABLET ORAL
Status: ON HOLD | COMMUNITY
Start: 2020-01-01 | End: 2020-01-01 | Stop reason: HOSPADM

## 2020-01-01 RX ORDER — INSULIN LISPRO 100 [IU]/ML
0-12 INJECTION, SOLUTION INTRAVENOUS; SUBCUTANEOUS EVERY 4 HOURS
Status: DISCONTINUED | OUTPATIENT
Start: 2020-01-01 | End: 2020-01-01 | Stop reason: HOSPADM

## 2020-01-01 RX ADMIN — LIDOCAINE HYDROCHLORIDE: 40 SOLUTION TOPICAL at 11:31

## 2020-01-01 RX ADMIN — Medication: at 02:34

## 2020-01-01 RX ADMIN — Medication: at 19:48

## 2020-01-01 RX ADMIN — PROPOFOL 30 MCG/KG/MIN: 10 INJECTION, EMULSION INTRAVENOUS at 03:18

## 2020-01-01 RX ADMIN — Medication: at 23:09

## 2020-01-01 RX ADMIN — Medication 500 ML/HR: at 15:27

## 2020-01-01 RX ADMIN — SODIUM BICARBONATE 50 MEQ: 84 INJECTION, SOLUTION INTRAVENOUS at 04:11

## 2020-01-01 RX ADMIN — Medication 1000 ML/HR: at 18:18

## 2020-01-01 RX ADMIN — Medication: at 18:17

## 2020-01-01 RX ADMIN — Medication 1000 ML/HR: at 23:30

## 2020-01-01 RX ADMIN — LIDOCAINE HYDROCHLORIDE 2.5 ML: 40 SOLUTION TOPICAL at 09:46

## 2020-01-01 RX ADMIN — INSULIN LISPRO 2 UNITS: 100 INJECTION, SOLUTION INTRAVENOUS; SUBCUTANEOUS at 09:09

## 2020-01-01 RX ADMIN — LIDOCAINE HYDROCHLORIDE 2.5 ML: 40 SOLUTION TOPICAL at 10:48

## 2020-01-01 RX ADMIN — PROPOFOL 30 MCG/KG/MIN: 10 INJECTION, EMULSION INTRAVENOUS at 07:48

## 2020-01-01 RX ADMIN — Medication: at 06:03

## 2020-01-01 RX ADMIN — HYDROMORPHONE HYDROCHLORIDE 0.5 MG/HR: 10 INJECTION, SOLUTION INTRAMUSCULAR; INTRAVENOUS; SUBCUTANEOUS at 00:14

## 2020-01-01 RX ADMIN — Medication: at 13:04

## 2020-01-01 RX ADMIN — Medication: at 15:26

## 2020-01-01 RX ADMIN — Medication 500 ML/HR: at 15:26

## 2020-01-01 RX ADMIN — Medication 10 ML: at 21:00

## 2020-01-01 RX ADMIN — Medication 500 ML/HR: at 02:29

## 2020-01-01 RX ADMIN — AMIODARONE HYDROCHLORIDE 300 MG: 50 INJECTION, SOLUTION INTRAVENOUS at 04:17

## 2020-01-01 RX ADMIN — INSULIN LISPRO 2 UNITS: 100 INJECTION, SOLUTION INTRAVENOUS; SUBCUTANEOUS at 13:30

## 2020-01-01 RX ADMIN — Medication: at 09:23

## 2020-01-01 RX ADMIN — TIROFIBAN 0.07 MCG/KG/MIN: 5 INJECTION, SOLUTION INTRAVENOUS at 18:04

## 2020-01-01 RX ADMIN — INSULIN LISPRO 4 UNITS: 100 INJECTION, SOLUTION INTRAVENOUS; SUBCUTANEOUS at 01:53

## 2020-01-01 RX ADMIN — EPINEPHRINE 1 MG: 0.1 INJECTION, SOLUTION ENDOTRACHEAL; INTRACARDIAC; INTRAVENOUS at 04:19

## 2020-01-01 RX ADMIN — Medication 10 ML: at 08:16

## 2020-01-01 RX ADMIN — INSULIN LISPRO 4 UNITS: 100 INJECTION, SOLUTION INTRAVENOUS; SUBCUTANEOUS at 06:04

## 2020-01-01 RX ADMIN — LIDOCAINE HYDROCHLORIDE: 40 SOLUTION TOPICAL at 15:19

## 2020-01-01 RX ADMIN — FAMOTIDINE 20 MG: 10 INJECTION, SOLUTION INTRAVENOUS at 08:16

## 2020-01-01 RX ADMIN — INSULIN LISPRO 2 UNITS: 100 INJECTION, SOLUTION INTRAVENOUS; SUBCUTANEOUS at 14:04

## 2020-01-01 RX ADMIN — CALCIUM CHLORIDE 1 G: 100 INJECTION, SOLUTION INTRAVENOUS; INTRAVENTRICULAR at 04:12

## 2020-01-01 RX ADMIN — SODIUM CHLORIDE 250 ML: 9 INJECTION, SOLUTION INTRAVENOUS at 08:18

## 2020-01-01 RX ADMIN — SODIUM CHLORIDE 4.17 UNITS/HR: 9 INJECTION, SOLUTION INTRAVENOUS at 21:10

## 2020-01-01 RX ADMIN — HEPARIN SODIUM 13 UNITS/KG/HR: 10000 INJECTION, SOLUTION INTRAVENOUS at 20:33

## 2020-01-01 RX ADMIN — AMPICILLIN SODIUM 500 MG: 500 INJECTION, POWDER, FOR SOLUTION INTRAMUSCULAR; INTRAVENOUS at 15:53

## 2020-01-01 RX ADMIN — SODIUM CHLORIDE 1000 ML: 9 INJECTION, SOLUTION INTRAVENOUS at 15:46

## 2020-01-01 RX ADMIN — SODIUM BICARBONATE 50 MEQ: 84 INJECTION, SOLUTION INTRAVENOUS at 04:18

## 2020-01-01 RX ADMIN — TIROFIBAN 0.07 MCG/KG/MIN: 5 INJECTION, SOLUTION INTRAVENOUS at 15:57

## 2020-01-01 RX ADMIN — Medication 10 ML: at 15:00

## 2020-01-01 RX ADMIN — Medication 10 ML: at 22:04

## 2020-01-01 RX ADMIN — Medication 15 ML: at 22:15

## 2020-01-01 RX ADMIN — PROPOFOL 30 MCG/KG/MIN: 10 INJECTION, EMULSION INTRAVENOUS at 05:36

## 2020-01-01 RX ADMIN — Medication: at 23:30

## 2020-01-01 RX ADMIN — HEPARIN SODIUM 2000 UNITS: 5000 INJECTION INTRAVENOUS; SUBCUTANEOUS at 03:22

## 2020-01-01 RX ADMIN — AMPICILLIN SODIUM 500 MG: 500 INJECTION, POWDER, FOR SOLUTION INTRAMUSCULAR; INTRAVENOUS at 00:47

## 2020-01-01 RX ADMIN — AMPICILLIN SODIUM 500 MG: 500 INJECTION, POWDER, FOR SOLUTION INTRAMUSCULAR; INTRAVENOUS at 22:02

## 2020-01-01 RX ADMIN — HEPARIN SODIUM 2000 UNITS: 5000 INJECTION INTRAVENOUS; SUBCUTANEOUS at 20:54

## 2020-01-01 RX ADMIN — Medication 500 ML/HR: at 13:04

## 2020-01-01 RX ADMIN — INSULIN LISPRO 4 UNITS: 100 INJECTION, SOLUTION INTRAVENOUS; SUBCUTANEOUS at 22:31

## 2020-01-01 RX ADMIN — INSULIN LISPRO 4 UNITS: 100 INJECTION, SOLUTION INTRAVENOUS; SUBCUTANEOUS at 18:33

## 2020-01-01 RX ADMIN — INSULIN LISPRO 4 UNITS: 100 INJECTION, SOLUTION INTRAVENOUS; SUBCUTANEOUS at 02:29

## 2020-01-01 RX ADMIN — Medication 15 ML: at 09:47

## 2020-01-01 RX ADMIN — AMPICILLIN SODIUM 500 MG: 500 INJECTION, POWDER, FOR SOLUTION INTRAMUSCULAR; INTRAVENOUS at 14:02

## 2020-01-01 RX ADMIN — LIDOCAINE HYDROCHLORIDE 2.5 ML: 40 SOLUTION TOPICAL at 10:21

## 2020-01-01 RX ADMIN — HEPARIN SODIUM 2000 UNITS: 5000 INJECTION INTRAVENOUS; SUBCUTANEOUS at 07:19

## 2020-01-01 RX ADMIN — HEPARIN SODIUM 4000 UNITS: 5000 INJECTION INTRAVENOUS; SUBCUTANEOUS at 20:14

## 2020-01-01 RX ADMIN — EPINEPHRINE 1 MG: 0.1 INJECTION, SOLUTION ENDOTRACHEAL; INTRACARDIAC; INTRAVENOUS at 04:12

## 2020-01-01 RX ADMIN — Medication 15 ML: at 09:39

## 2020-01-01 RX ADMIN — AMPICILLIN SODIUM 500 MG: 500 INJECTION, POWDER, FOR SOLUTION INTRAMUSCULAR; INTRAVENOUS at 22:31

## 2020-01-01 RX ADMIN — LIDOCAINE HYDROCHLORIDE 2.5 ML: 40 SOLUTION TOPICAL at 16:56

## 2020-01-01 RX ADMIN — CALCIUM GLUCONATE 1 G: 98 INJECTION, SOLUTION INTRAVENOUS at 10:51

## 2020-01-01 RX ADMIN — CALCIUM GLUCONATE 1 G: 98 INJECTION, SOLUTION INTRAVENOUS at 07:21

## 2020-01-01 RX ADMIN — Medication 15 ML: at 22:03

## 2020-01-01 RX ADMIN — AMPICILLIN SODIUM 500 MG: 500 INJECTION, POWDER, FOR SOLUTION INTRAMUSCULAR; INTRAVENOUS at 08:06

## 2020-01-01 RX ADMIN — LORAZEPAM 0.5 MG: 2 INJECTION INTRAMUSCULAR; INTRAVENOUS at 22:50

## 2020-01-01 RX ADMIN — INSULIN LISPRO 2 UNITS: 100 INJECTION, SOLUTION INTRAVENOUS; SUBCUTANEOUS at 06:09

## 2020-01-01 RX ADMIN — PROPOFOL 30 MCG/KG/MIN: 10 INJECTION, EMULSION INTRAVENOUS at 10:31

## 2020-01-01 RX ADMIN — EPINEPHRINE 1 MG: 0.1 INJECTION, SOLUTION ENDOTRACHEAL; INTRACARDIAC; INTRAVENOUS at 04:09

## 2020-01-01 RX ADMIN — INSULIN LISPRO 2 UNITS: 100 INJECTION, SOLUTION INTRAVENOUS; SUBCUTANEOUS at 09:57

## 2020-01-01 RX ADMIN — COLLAGENASE SANTYL: 250 OINTMENT TOPICAL at 08:15

## 2020-01-01 RX ADMIN — Medication 15 ML: at 08:16

## 2020-01-01 RX ADMIN — LORAZEPAM 0.5 MG: 2 INJECTION INTRAMUSCULAR at 22:50

## 2020-01-01 RX ADMIN — Medication 15 ML: at 21:00

## 2020-01-01 RX ADMIN — HEPARIN SODIUM 2000 UNITS: 5000 INJECTION INTRAVENOUS; SUBCUTANEOUS at 09:59

## 2020-01-01 RX ADMIN — EPINEPHRINE 1 MG: 0.1 INJECTION, SOLUTION ENDOTRACHEAL; INTRACARDIAC; INTRAVENOUS at 04:16

## 2020-01-01 RX ADMIN — OXYCODONE HYDROCHLORIDE AND ACETAMINOPHEN 1 TABLET: 7.5; 325 TABLET ORAL at 18:46

## 2020-01-01 RX ADMIN — DEXTROSE MONOHYDRATE 5 MCG/MIN: 50 INJECTION, SOLUTION INTRAVENOUS at 14:56

## 2020-01-01 RX ADMIN — PROPOFOL 30 MCG/KG/MIN: 10 INJECTION, EMULSION INTRAVENOUS at 15:53

## 2020-01-01 RX ADMIN — AMPICILLIN SODIUM 500 MG: 500 INJECTION, POWDER, FOR SOLUTION INTRAMUSCULAR; INTRAVENOUS at 06:03

## 2020-01-01 RX ADMIN — HYDROMORPHONE HYDROCHLORIDE 1 MG/HR: 10 INJECTION, SOLUTION INTRAMUSCULAR; INTRAVENOUS; SUBCUTANEOUS at 22:33

## 2020-01-01 RX ADMIN — FAMOTIDINE 20 MG: 10 INJECTION, SOLUTION INTRAVENOUS at 15:49

## 2020-01-01 RX ADMIN — HEPARIN SODIUM 17 UNITS/KG/HR: 10000 INJECTION, SOLUTION INTRAVENOUS at 14:00

## 2020-01-01 RX ADMIN — INSULIN LISPRO 6 UNITS: 100 INJECTION, SOLUTION INTRAVENOUS; SUBCUTANEOUS at 22:13

## 2020-01-01 RX ADMIN — LIDOCAINE HYDROCHLORIDE 2.5 ML: 40 SOLUTION TOPICAL at 16:37

## 2020-01-01 RX ADMIN — PROPOFOL 15 MCG/KG/MIN: 10 INJECTION, EMULSION INTRAVENOUS at 14:40

## 2020-01-01 RX ADMIN — LIDOCAINE HYDROCHLORIDE: 40 SOLUTION TOPICAL at 09:00

## 2020-01-01 SDOH — HEALTH STABILITY: MENTAL HEALTH: HOW OFTEN DO YOU HAVE A DRINK CONTAINING ALCOHOL?: NEVER

## 2020-01-01 ASSESSMENT — PULMONARY FUNCTION TESTS
PIF_VALUE: 19
PIF_VALUE: 20
PIF_VALUE: 22
PIF_VALUE: 20
PIF_VALUE: 21
PIF_VALUE: 15
PIF_VALUE: 20
PIF_VALUE: 18
PIF_VALUE: 15
PIF_VALUE: 24
PIF_VALUE: 15
PIF_VALUE: 22
PIF_VALUE: 18
PIF_VALUE: 19
PIF_VALUE: 21
PIF_VALUE: 19
PIF_VALUE: 27
PIF_VALUE: 19
PIF_VALUE: 19
PIF_VALUE: 26
PIF_VALUE: 22
PIF_VALUE: 23
PIF_VALUE: 16
PIF_VALUE: 20
PIF_VALUE: 21
PIF_VALUE: 15
PIF_VALUE: 21
PIF_VALUE: 19
PIF_VALUE: 22
PIF_VALUE: 20
PIF_VALUE: 13
PIF_VALUE: 22
PIF_VALUE: 21
PIF_VALUE: 22
PIF_VALUE: 18
PIF_VALUE: 21
PIF_VALUE: 18
PIF_VALUE: 17
PIF_VALUE: 19
PIF_VALUE: 30
PIF_VALUE: 21
PIF_VALUE: 23
PIF_VALUE: 15
PIF_VALUE: 23
PIF_VALUE: 21
PIF_VALUE: 26
PIF_VALUE: 18
PIF_VALUE: 20
PIF_VALUE: 23
PIF_VALUE: 15
PIF_VALUE: 20
PIF_VALUE: 18
PIF_VALUE: 21
PIF_VALUE: 19
PIF_VALUE: 21
PIF_VALUE: 21
PIF_VALUE: 19
PIF_VALUE: 24
PIF_VALUE: 20
PIF_VALUE: 17
PIF_VALUE: 22
PIF_VALUE: 19
PIF_VALUE: 20
PIF_VALUE: 21
PIF_VALUE: 28
PIF_VALUE: 24
PIF_VALUE: 15
PIF_VALUE: 21
PIF_VALUE: 20
PIF_VALUE: 15
PIF_VALUE: 24
PIF_VALUE: 28
PIF_VALUE: 26
PIF_VALUE: 15
PIF_VALUE: 15
PIF_VALUE: 22
PIF_VALUE: 15
PIF_VALUE: 20
PIF_VALUE: 21
PIF_VALUE: 23
PIF_VALUE: 18
PIF_VALUE: 19
PIF_VALUE: 21
PIF_VALUE: 19
PIF_VALUE: 16
PIF_VALUE: 21
PIF_VALUE: 25
PIF_VALUE: 15
PIF_VALUE: 20
PIF_VALUE: 20
PIF_VALUE: 15
PIF_VALUE: 26
PIF_VALUE: 20
PIF_VALUE: 19
PIF_VALUE: 15
PIF_VALUE: 20
PIF_VALUE: 22
PIF_VALUE: 18
PIF_VALUE: 23
PIF_VALUE: 21
PIF_VALUE: 22
PIF_VALUE: 15
PIF_VALUE: 21
PIF_VALUE: 20
PIF_VALUE: 21
PIF_VALUE: 13
PIF_VALUE: 19
PIF_VALUE: 31
PIF_VALUE: 19
PIF_VALUE: 20
PIF_VALUE: 23
PIF_VALUE: 21
PIF_VALUE: 8
PIF_VALUE: 15
PIF_VALUE: 15
PIF_VALUE: 18
PIF_VALUE: 19
PIF_VALUE: 15
PIF_VALUE: 30
PIF_VALUE: 19
PIF_VALUE: 22
PIF_VALUE: 21
PIF_VALUE: 23
PIF_VALUE: 24
PIF_VALUE: 15
PIF_VALUE: 15
PIF_VALUE: 20
PIF_VALUE: 20
PIF_VALUE: 21
PIF_VALUE: 20
PIF_VALUE: 22
PIF_VALUE: 15
PIF_VALUE: 21
PIF_VALUE: 17
PIF_VALUE: 23
PIF_VALUE: 20
PIF_VALUE: 20
PIF_VALUE: 23
PIF_VALUE: 26
PIF_VALUE: 25

## 2020-01-01 ASSESSMENT — PAIN DESCRIPTION - ORIENTATION
ORIENTATION: RIGHT
ORIENTATION: LEFT

## 2020-01-01 ASSESSMENT — PAIN DESCRIPTION - PAIN TYPE
TYPE: CHRONIC PAIN
TYPE: CHRONIC PAIN
TYPE: ACUTE PAIN
TYPE: CHRONIC PAIN

## 2020-01-01 ASSESSMENT — PAIN DESCRIPTION - FREQUENCY
FREQUENCY: CONTINUOUS

## 2020-01-01 ASSESSMENT — PAIN SCALES - GENERAL
PAINLEVEL_OUTOF10: 0
PAINLEVEL_OUTOF10: 7
PAINLEVEL_OUTOF10: 0
PAINLEVEL_OUTOF10: 0
PAINLEVEL_OUTOF10: 10
PAINLEVEL_OUTOF10: 4
PAINLEVEL_OUTOF10: 0
PAINLEVEL_OUTOF10: 7
PAINLEVEL_OUTOF10: 3
PAINLEVEL_OUTOF10: 0
PAINLEVEL_OUTOF10: 0
PAINLEVEL_OUTOF10: 6
PAINLEVEL_OUTOF10: 7
PAINLEVEL_OUTOF10: 5
PAINLEVEL_OUTOF10: 6

## 2020-01-01 ASSESSMENT — PAIN DESCRIPTION - ONSET
ONSET: AWAKENED FROM SLEEP
ONSET: ON-GOING
ONSET: ON-GOING

## 2020-01-01 ASSESSMENT — PAIN DESCRIPTION - LOCATION
LOCATION: SHOULDER
LOCATION: KNEE
LOCATION: FOOT;ANKLE
LOCATION: KNEE
LOCATION: LEG

## 2020-01-01 ASSESSMENT — PAIN - FUNCTIONAL ASSESSMENT
PAIN_FUNCTIONAL_ASSESSMENT: PREVENTS OR INTERFERES SOME ACTIVE ACTIVITIES AND ADLS
PAIN_FUNCTIONAL_ASSESSMENT: PREVENTS OR INTERFERES SOME ACTIVE ACTIVITIES AND ADLS

## 2020-01-01 ASSESSMENT — PAIN DESCRIPTION - PROGRESSION
CLINICAL_PROGRESSION: GRADUALLY IMPROVING
CLINICAL_PROGRESSION: GRADUALLY WORSENING
CLINICAL_PROGRESSION: GRADUALLY WORSENING

## 2020-01-01 ASSESSMENT — PAIN DESCRIPTION - DESCRIPTORS
DESCRIPTORS: ACHING
DESCRIPTORS: SHARP;SHOOTING
DESCRIPTORS: ACHING
DESCRIPTORS: ACHING;SORE
DESCRIPTORS: ACHING
DESCRIPTORS: ACHING
DESCRIPTORS: ACHING;DISCOMFORT

## 2020-01-07 NOTE — PROGRESS NOTES
DIAGNOSIS:  Left septic knee, s/p arthrotom incision and drainage. DATE OF SURGERY: 11/24/2019. HISTORY OF PRESENT ILLNESS:  Ms. Jaxson Shearer 61 y.o.  female who came in today for 2 weeks postoperative visit. The patient denies any significant pain in the left knee. She rates her pain a 7/10 VAS but states she always has pain in her left knee. She has been NWB. She is in a rehab center working with physical therapy. No numbness or tingling sensation. No fever or Chills. She grew up diphtheroids in broth only and on IV Vancomycin until 1/11/2020 and will change to  mg for 6 months managed by Dr Ariana Eckert, ID. She is a diabetic with chronic renal failure on dialysis.     Past Medical History:   Diagnosis Date    Arthritis     [facility] ([facility]     ESRD (end stage renal disease) ([facility]     Hemodialysis patient ([facility]     Hyperlipidemia     Hypertension     MRSA nasal colonization 07/17/2019    Thyroid disease        Past Surgical History:   Procedure Laterality Date    FOOT SURGERY Left 08/15/2017    LEFT SECOND TOE INCISION AND DEBRIDEMENT OF TISSUE AND BONE WITH OPEN ARTHROPLASTY    INCISION AND DRAINAGE Left 7/19/2019    INCISION AND DRAINAGE OF DEEP ABSCESS OF LEFT LEG AND LEFT SEPTIC  KNEE ARTHROTOMY performed by Nj Sewell MD at [address] [place] [address] Left 11/24/2019    LEFT KNEE INCISION AND DRAINAGE performed by Nj Sewell MD at [place] [address] Left 08/17/2017    INCISION AND DRAINAGE, 2ND DIGIT AMPUTATION LEFT FOOT       Social History     Socioeconomic History    Marital status:      Spouse name: Not on file    Number of children: Not on file    Years of education: Not on file    Highest education level: Not on file   Occupational History    Not on file   Social Needs    Financial resource strain: Not on file    Food insecurity:     Worry: Not on file     Inability: Not on file    Transportation needs:     Medical: Not on file Non-medical: Not on file   Tobacco Use    Smoking status: Former Smoker     Packs/day: 0.25     Years: 20.00     Pack years: 5.00     Types: Cigarettes     Last attempt to quit: 7/17/2016     Years since quitting: 3.4    Smokeless tobacco: Never Used   Substance and Sexual Activity    Alcohol use: No    Drug use: No    Sexual activity: Yes     Partners: Male   Lifestyle    Physical activity:     Days per week: Not on file     Minutes per session: Not on file    Stress: Not on file   Relationships    Social connections:     Talks on phone: Not on file     Gets together: Not on file     Attends Religion service: Not on file     Active member of club or organization: Not on file     Attends meetings of clubs or organizations: Not on file     Relationship status: Not on file    Intimate partner violence:     Fear of current or ex partner: Not on file     Emotionally abused: Not on file     Physically abused: Not on file     Forced sexual activity: Not on file   Other Topics Concern    Not on file   Social History Narrative    Not on file       History reviewed. No pertinent family history.     Current Outpatient Medications on File Prior to Visit   Medication Sig Dispense Refill    sennosides-docusate sodium (SENOKOT-S) 8.6-50 MG tablet Take 2 tablets by mouth 2 times daily 60 tablet 3    ferrous sulfate 325 (65 Fe) MG tablet Take 1 tablet by mouth daily (with breakfast) 90 tablet 1    famotidine (PEPCID) 20 MG tablet Take 1 tablet by mouth daily 60 tablet 3    aspirin-acetaminophen-caffeine (EXCEDRIN MIGRAINE) 250-250-65 MG per tablet Take 1 tablet by mouth every 6 hours as needed for Headaches      acetaminophen (TYLENOL) 325 MG tablet Take 650 mg by mouth every 6 hours as needed for Pain      bumetanide (BUMEX) 1 MG tablet Take 2 mg by mouth 2 times daily      cyclobenzaprine (FLEXERIL) 10 MG tablet Take 10 mg by mouth nightly      levothyroxine (SYNTHROID) 50 MCG tablet Take 50 mcg by mouth Daily      insulin glargine (LANTUS) 100 UNIT/ML injection vial Inject 20 Units into the skin 2 times daily       insulin lispro (HUMALOG) 100 UNIT/ML injection vial Inject 5 Units into the skin 3 times daily (before meals) If BG > or = 150      B Complex-C-Folic Acid (LILIA CAPS) 1 MG CAPS Take 1 capsule by mouth daily       aspirin 81 MG tablet Take 81 mg by mouth daily      nystatin (MYCOSTATIN) 819716 UNIT/GM ointment Apply topically 3 times daily Apply topically 2 times daily.  triamcinolone (KENALOG) 0.1 % ointment Apply topically 2 times daily Apply topically 2 times daily.  norethindrone (AYGESTIN) 5 MG tablet Take 5 mg by mouth 2 times daily       pravastatin (PRAVACHOL) 20 MG tablet Take 20 mg by mouth nightly       sevelamer (RENVELA) 800 MG tablet Take 4 tablets by mouth 3 times daily (with meals)      amLODIPine (NORVASC) 10 MG tablet Take 10 mg by mouth daily Hold for SBP less than 100       No current facility-administered medications on file prior to visit. Pertinent items are noted in HPI  Review of systems reviewed from Patient History Form dated on 9/10/2019 and available in the patient's chart under the Media tab. No change noted. PHYSICAL EXAMINATION:  Ms. Hermelindo Carlos is a very pleasant 61 y.o.  female who presents today in no acute distress, awake, alert, and oriented. She is well dressed, nourished and  groomed. Patient with normal affect. Height is  5' 7\" (1.702 m), weight is 262 lb (118.8 kg), Body mass index is 41.04 kg/m². Resting respiratory rate is 16. She ambulates using a wheelchair, [facility]. The incision healing well . No signs of any erythema or drainage, mild swelling left knee, but moderate swelling left calf. She has no pain with the active or passive range of motion of the left knee, good ROM. She has intact sensation distally, and she is neurovascularly intact. Ankle reflex 1+ bilaterally.  Good strength, and no instability both upper and lower extremities. X-rays 3 views of the left knee taken today in office were reviewed and showed moderate to significant arthritis tri-compartmental.    IMPRESSION:  6 weeks out from left septic knee, s/p incision and drainage, and doing very well. PLAN: She can go back to normal activity with no heavy impact activities for 6 weeks, WBAT. The patient will come back for a follow up in 3 months. The patient understands that there is a chance of abscess recurrence even after surgical I&D.     William Layne MD

## 2020-01-09 NOTE — PROGRESS NOTES
Infectious Diseases Oupatient Follow-up Note    Primary Care Physician:   Tiara Sepulveda  Last visit:    [facility] 12/19/19  History Obtained From:    Patient, EPIC    CHIEF COMPLAINT / ID problem:     Chief Complaint   Patient presents with    Follow-up     infection of left foot       HISTORY OF PRESENT ILLNESS / Interval history: Lucia Prado is a 61 y.o. female who was seen today for hospital f/u, L knee septic arthritis    60 yo woman with mult medical problems including obesity, DM, CRF on HD, HTN, thyroid d, high lipids  Hx DFI 7/2019.  Nasal MRSA probe positive 7/17/19. Hx L knee septic arthitis, L leg abscess, I&D 7/19/19, culture Diphtheroids (from broth)     Admit 11/24 with L knee septic arthritis.  Arthrocentesis with ,390.  OR I&D.  Culture - C acnes from broth. Rx iv vancomycin 750 mg at HD / po ciprofloxacin 500 mg daily through 12/28.       Admit 12/14 with abd pain and SBO. CT with large ventral hernia with bowel, dilated loops of bowel  12/16 emesis, NGT placed, 'thick bilious material'. Seen by Podiatry for L heel wound, no sign infection per notes (cult + E coli, ESBL)  Discharged on 12/19 on iv vancomycin, plan to treat through 1/11/20. Today 1/9/20 -  Pt has been getting iv vancomycin 1 gm at HD M/W/Fr. L knee better. Seen by Dr Tanmay Franklin on 1/7.   Now able to weight bear and participate in PT  She reports that L heel 'looks good', healing / dry      Past Medical History:    Past Medical History:   Diagnosis Date    Arthritis     Diabetes mellitus ([facility]     ESRD (end stage renal disease) ([facility]     Hemodialysis patient ([facility]     Hyperlipidemia     Hypertension     MRSA nasal colonization 07/17/2019    Thyroid disease        Past Surgical History:    Past Surgical History:   Procedure Laterality Date    FOOT SURGERY Left 08/15/2017    LEFT SECOND TOE INCISION AND DEBRIDEMENT OF TISSUE AND BONE WITH OPEN ARTHROPLASTY    INCISION AND DRAINAGE Left 7/19/2019 INCISION AND DRAINAGE OF DEEP ABSCESS OF LEFT LEG AND LEFT SEPTIC  KNEE ARTHROTOMY performed by Wyatt Nair MD at [facility] Left 11/24/2019    LEFT KNEE INCISION AND DRAINAGE performed by Wyatt Nair MD at [facility] Left 08/17/2017    INCISION AND DRAINAGE, 2ND DIGIT AMPUTATION LEFT FOOT       Current Medications:    Current Outpatient Medications   Medication Sig Dispense Refill    sennosides-docusate sodium (SENOKOT-S) 8.6-50 MG tablet Take 2 tablets by mouth 2 times daily 60 tablet 3    ferrous sulfate 325 (65 Fe) MG tablet Take 1 tablet by mouth daily (with breakfast) 90 tablet 1    famotidine (PEPCID) 20 MG tablet Take 1 tablet by mouth daily 60 tablet 3    aspirin-acetaminophen-caffeine (EXCEDRIN MIGRAINE) 250-250-65 MG per tablet Take 1 tablet by mouth every 6 hours as needed for Headaches      acetaminophen (TYLENOL) 325 MG tablet Take 650 mg by mouth every 6 hours as needed for Pain      bumetanide (BUMEX) 1 MG tablet Take 2 mg by mouth 2 times daily      cyclobenzaprine (FLEXERIL) 10 MG tablet Take 10 mg by mouth nightly      levothyroxine (SYNTHROID) 50 MCG tablet Take 50 mcg by mouth Daily      insulin glargine (LANTUS) 100 UNIT/ML injection vial Inject 20 Units into the skin 2 times daily       insulin lispro (HUMALOG) 100 UNIT/ML injection vial Inject 5 Units into the skin 3 times daily (before meals) If BG > or = 150      B Complex-C-Folic Acid (LILIA CAPS) 1 MG CAPS Take 1 capsule by mouth daily       aspirin 81 MG tablet Take 81 mg by mouth daily      nystatin (MYCOSTATIN) 767709 UNIT/GM ointment Apply topically 3 times daily Apply topically 2 times daily.  triamcinolone (KENALOG) 0.1 % ointment Apply topically 2 times daily Apply topically 2 times daily.       norethindrone (AYGESTIN) 5 MG tablet Take 5 mg by mouth 2 times daily       pravastatin (PRAVACHOL) 20 MG tablet Take 20 mg by mouth nightly       sevelamer (RENVELA) 800 MG tablet Take 4 tablets by mouth 3 times daily (with meals)      amLODIPine (NORVASC) 10 MG tablet Take 10 mg by mouth daily Hold for SBP less than 100       No current facility-administered medications for this visit. Allergies:  Patient has no known allergies. Social History:    TOBACCO:                 None - past, quit 7/2017  ETOH:                         None    DRUGS:                      None   MARITAL STATUS:        Staying at [address] [place] [address] History:   No immunodeficiency    REVIEW OF SYSTEMS:    No fever / chills / sweats. No weight loss. No visual change, eye pain, eye discharge. No oral lesion, sore throat, dysphagia. Denies cough / sputum. Denies chest pain, palpitations. Denies n / v / abd pain. No diarrhea. Denies dysuria or change in urinary function. Denies joint swelling or pain. No myalgia, arthralgia. Denies skin change, rash, itching  Denies focal weakness, sensory change or other neurologic symptom  Denies new depression or other psychiatric problem. No symptoms endocrine disorder. No symptoms hematologic disorder. PHYSICAL EXAM:    Vitals:    BP (!) 143/68 (Site: Right Upper Arm, Position: Sitting, Cuff Size: Medium Adult)   Temp 98.1 °F (36.7 °C) (Oral)   Wt 262 lb 8 oz (119.1 kg)   BMI 41.11 kg/m²     GENERAL: No apparent distress. HEENT: Membranes moist, no oral lesion  NECK:  Supple  LYMPH: No adenopathy   LUNGS: Clear b/l, no rales, no dullness  CARDIAC: RRR, no murmur appreciated  ABD:  + BS, soft / NT  EXT:  LE edema, L>R. L knee and lower leg wound healed.   NEURO: No focal neurologic findings  PSYCH: Orientation, sensorium, mood normal      DATA:    See Epic    Micro:  12/16 L heel wound cult - GS no WBC, no organism; culture - E coli ESBL     11/24 Body fluid cult - Cutibacterium acnes   11/24 Surg cult Diphtheroids     7/19 Fluid cult  - Diphtheroids from broth      IMPRESSION    Mult medical problems including obesity, DM,

## 2020-01-13 NOTE — TELEPHONE ENCOUNTER
Noted -  Would continue same antibiotic (penicillin  mg po bid)  Do NOT feel visual change is from penicillin

## 2020-01-14 NOTE — PROGRESS NOTES
DRAINAGE, 2ND DIGIT AMPUTATION LEFT FOOT       FAMILY HISTORY    History reviewed. No pertinent family history. SOCIAL HISTORY    Social History     Tobacco Use    Smoking status: Former Smoker     Packs/day: 0.25     Years: 20.00     Pack years: 5.00     Types: Cigarettes     Last attempt to quit: 7/17/2016     Years since quitting: 3.4    Smokeless tobacco: Never Used   Substance Use Topics    Alcohol use: No    Drug use: No       ALLERGIES    No Known Allergies    MEDICATIONS    Current Outpatient Medications on File Prior to Encounter   Medication Sig Dispense Refill    ammonium lactate (LAC-HYDRIN) 5 % LOTN lotion Apply topically 2 times daily      oxyCODONE 5 MG capsule Take 10 mg by mouth every 4 hours as needed for Pain.       penicillin v potassium (VEETID) 500 MG tablet Take 500 mg by mouth 2 times daily x6 months      Amino Acids-Protein Hydrolys (PRO-STAT) LIQD Take 30 each by mouth 2 times daily 30 cc bid      sennosides-docusate sodium (SENOKOT-S) 8.6-50 MG tablet Take 2 tablets by mouth 2 times daily 60 tablet 3    ferrous sulfate 325 (65 Fe) MG tablet Take 1 tablet by mouth daily (with breakfast) 90 tablet 1    famotidine (PEPCID) 20 MG tablet Take 1 tablet by mouth daily 60 tablet 3    acetaminophen (TYLENOL) 325 MG tablet Take 650 mg by mouth every 6 hours as needed for Pain      bumetanide (BUMEX) 1 MG tablet Take 2 mg by mouth 2 times daily      cyclobenzaprine (FLEXERIL) 10 MG tablet Take 10 mg by mouth nightly      levothyroxine (SYNTHROID) 50 MCG tablet Take 50 mcg by mouth Daily      insulin glargine (LANTUS) 100 UNIT/ML injection vial Inject 20 Units into the skin 2 times daily       insulin lispro (HUMALOG) 100 UNIT/ML injection vial Inject 5 Units into the skin 3 times daily (before meals) If BG > or = 150      B Complex-C-Folic Acid (LILIA CAPS) 1 MG CAPS Take 1 capsule by mouth daily       aspirin 81 MG tablet Take 81 mg by mouth daily      nystatin (MYCOSTATIN) 038837 UNIT/GM ointment Apply topically 3 times daily Apply topically 2 times daily.  triamcinolone (KENALOG) 0.1 % ointment Apply topically 2 times daily Apply topically 2 times daily.  norethindrone (AYGESTIN) 5 MG tablet Take 5 mg by mouth 2 times daily       pravastatin (PRAVACHOL) 20 MG tablet Take 20 mg by mouth nightly       sevelamer (RENVELA) 800 MG tablet Take 4 tablets by mouth 3 times daily (with meals)      amLODIPine (NORVASC) 10 MG tablet Take 10 mg by mouth daily Hold for SBP less than 100      SANTYL 250 UNIT/GM ointment Apply 1 Dose topically daily      bisacodyl (DULCOLAX) 10 MG suppository Place 10 mg rectally daily as needed for Constipation      polyethylene glycol (GLYCOLAX) powder Take 17 g by mouth 2 times daily as needed      aspirin-acetaminophen-caffeine (EXCEDRIN MIGRAINE) 250-250-65 MG per tablet Take 1 tablet by mouth every 6 hours as needed for Headaches       No current facility-administered medications on file prior to encounter. REVIEW OF SYSTEMS    A comprehensive review of systems was negative. Last M9I if applicable:   Hemoglobin A1C   Date Value Ref Range Status   12/14/2019 7.0 See comment % Final     Comment:     Comment:  Diagnosis of Diabetes: > or = 6.5%  Increased risk of diabetes (Prediabetes): 5.7-6.4%  Glycemic Control: Nonpregnant Adults: <7.0%                    Pregnant: <6.0%           Objective: There were no vitals taken for this visit. Wt Readings from Last 3 Encounters:   01/09/20 262 lb 8 oz (119.1 kg)   01/07/20 262 lb (118.8 kg)   12/18/19 262 lb 5.6 oz (119 kg)       PHYSICAL EXAM    General Appearance: alert and oriented to person, place and time, well developed and well- nourished, in no acute distress. Morbid obesity.    Skin: warm and dry, no rash or erythema  Head: normocephalic and atraumatic  Eyes: pupils equal, round, and reactive to light, extraocular eye movements intact, conjunctivae normal  ENT:  external ear Leg edema, left R60.0    Pain and swelling of left lower extremity M79.605, M79.89    Venous (peripheral) insufficiency I87.2    Lymphedema I89.0    Morbid obesity due to excess calories ([facility]) E66.01    Arthritis, septic, knee ([facility]) M00.9    ESRD on hemodialysis ([facility]) N18.6, Z99.2    Diabetic nephropathy associated with type 2 diabetes mellitus ([facility]) E11.21    History of methicillin resistant staphylococcus aureus (MRSA) Z86.14    Claustrophobia F40.240    Status post incision and drainage Z98.890    Small bowel obstruction ([address] K56.609    Infection due to Cutibacterium species A49.8    Ventral hernia with bowel obstruction K43.6          Procedure Note  Indications:  Based on my examination of this patient's wound(s)/ulcer(s) today, debridement is required to promote healing and evaluate the wound base. Performed by: Olive Mancilla MD    Consent obtained:  Yes    Time out taken:  Yes    Pain Control: Anesthetic  Anesthetic: 4% Lidocaine Liquid Topical       Debridement:Excisional Debridement    Using curette, scissors and forceps the wound(s)/ulcer(s) was/were sharply debrided down through and including the removal of epidermis, dermis and subcutaneous tissue. Devitalized Tissue Debrided:  fibrin, slough and callus    Wound/Ulcer #: 1    Wound Measurements: Assessment of the wounds are pre debridement:  Wound 12/16/19 Leg Left;Lateral;Lower hematoma (Active)   Wound Other 12/16/2019  8:02 PM   Offloading for Diabetic Foot Ulcers Other (comment) 12/17/2019  5:10 PM   Dressing Status Other (Comment) 12/17/2019  3:12 AM   Dressing/Treatment Ace wrap 12/19/2019 12:00 PM   Wound Assessment Other (Comment) 12/19/2019 12:00 PM   Drainage Amount None 12/19/2019 12:00 PM   Odor None 12/19/2019 12:00 PM   Margins Unattached edges 12/16/2019  3:14 PM   Annamarie-wound Assessment Pink; Intact;Dry 12/16/2019  3:14 PM   Number of days: 28       Wound 01/14/20 Foot Left;Plantar #1 (Active)   Wound Image   1/14/2020  9:24 AM   Offloading for Diabetic Foot Ulcers Back offloading shoe 1/14/2020  9:24 AM   Dressing/Treatment Collagen;Hydrofera blue 1/14/2020 10:13 AM   Wound Cleansed Rinsed/Irrigated with saline 1/14/2020  9:24 AM   Wound Length (cm) 3 cm 1/14/2020  9:24 AM   Wound Width (cm) 3.1 cm 1/14/2020  9:24 AM   Wound Depth (cm) 0.3 cm 1/14/2020  9:24 AM   Wound Surface Area (cm^2) 9.3 cm^2 1/14/2020  9:24 AM   Wound Volume (cm^3) 2.79 cm^3 1/14/2020  9:24 AM   Post-Procedure Length (cm) 3.1 cm 1/14/2020 10:03 AM   Post-Procedure Width (cm) 3.2 cm 1/14/2020 10:03 AM   Post-Procedure Depth (cm) 0.5 cm 1/14/2020 10:03 AM   Post-Procedure Surface Area (cm^2) 9.92 cm^2 1/14/2020 10:03 AM   Post-Procedure Volume (cm^3) 4.96 cm^3 1/14/2020 10:03 AM   Tunneling Position ___ O'Clock 0 1/14/2020  9:24 AM   Undermining Starts ___ O'Clock 0 1/14/2020  9:24 AM   Wound Assessment Yellow;Pink 1/14/2020  9:24 AM   Drainage Amount Small 1/14/2020  9:24 AM   Drainage Description Yellow;Brown 1/14/2020  9:24 AM   Odor None 1/14/2020  9:24 AM   Margins Defined edges 1/14/2020  9:24 AM   Annamarie-wound Assessment Calloused; Tan 1/14/2020  9:24 AM   Non-staged Wound Description Full thickness 1/14/2020  9:24 AM   Burdette%Wound Bed 85 1/14/2020  9:24 AM   Yellow%Wound Bed 15 1/14/2020  9:24 AM   Number of days: 0          Percent of Wound(s)/Ulcer(s) Debrided: 100%    Total Surface Area Debrided:  9.92 sq cm       Diabetic/Pressure/Non Pressure Ulcers only:  Ulcer: Diabetic ulcer, fat layer exposed      Estimated Blood Loss:  Minimal    Hemostasis Achieved:  by pressure    Procedural Pain:  0  / 10     Post Procedural Pain:  0 / 10     Response to treatment:  Well tolerated by patient.      Plan:       Treatment Note please see attached Discharge Instructions    New Medication(s) at this visit:   New Prescriptions    No medications on file       Other orders at this visit:   Orders Placed This Encounter   Procedures    Wound care with saline      [] Betadine   [] Bactroban/Mupirocin   [] Polysporin/Neosporin  [] Other:    [] Saline/Vashe \"wet to dry\" gauze     Pack wound loosely with: [] Iodoform   [] Plain Packing  [] Saline \"wet to dry\"      [x] Cover and Secure with:     [x] Dry Gauze  [] ABD  [] Cast padding  [x] Kerlix or Lo rolled gauze [] Super Absorbent (i.e. Sho Hemp)     [] Coban  [] Ace Wrap [] Ace Wrap from forefoot to just below the knee  [] Paper Tape [] Medipore Tape [] Other:    Avoid contact of tape with skin if possible. [x] Change dressing: [] Daily    [x] Every Other Day  [] Three times per week:  [] Monday, Wednesday, Friday  [x] Tuesday, Thursday, Saturday   [] Once a week  [] Do Not Change Dressing [] Other:         Off-Loading:   [] Off-loading (keeping weight off as much as you can) when: [] walking  [] in bed [] sitting    [] Total non-weight bearing:  [] Right Leg  [] Left Leg     [] Partial weight bearing:   [] Right Leg  [] Left Leg     [] Heel weight bearing   [] Toe-touch weight bearing  [] Transfers only    [] No Weight bearing restrictions    [] Assistive Device: [] Walker [] Crutches   [] Wheelchair   [] Roll About   [x] Surgical shoe/Darco [] Podus Boot(s)    [] Heel Protector Soft Boot(s) - DO NOT WALK WHILE WEARING    [] Cast/CAM Boot  [] CROW Boot [] Other:      Dietary:  Important dietary reminders:  1. Increase Protein intake (i.e. Lean meats, fish, eggs, legumes, and yogurt)  2. No added salt  3. If diabetic, follow a diabetic diet and check glucose prior to meals or as instructed by your physician. Dietary Supplements: [] Sohail [x] 30ml ProMod/ProStat [] Other:   Take supplements twice a day or as directed as followed: Kevin Ramos If you are still having pain after you go home:   For wounds on lower legs or arms, elevate the affected limb.  Use over-the-counter medications you would normally use for pain as permitted by your family doctor.    For persistent pain not relieved by the above interventions, please call your family doctor. Return Appointment:   DME/Wound Dressing Supplies Provided by:   o (Please call them directly to reorder supplies when you run out)   ECF or Home Healthcare:    Wound reassessment visit with Nurse :     Joseph Anaya Return Appointment: With Dr. Zeus Cardenas  in  [address] [place] [address]). [x] Orders placed during your visit:   Orders Placed This Encounter   Procedures    Wound care         Your nurse  is:Melanie     Electronically signed by Daysi Richmond RN on 1/14/2020 at 10:07 AM     Johanne Gonzalez 281: Should you experience any significant changes in your wound(s) or have questions about your wound care, please contact the [address] [place] [address] at [phone]. Our hours vary so please leave a message. Please give us 24-48 hours to return your call. Call your doctor now or seek immediate medical care if:    · You have symptoms of infection, such as:  ? Increased pain, swelling, warmth, or redness. ? Red streaks leading from the area. ? Pus draining from the area. ? A fever. PLEASE NOTE: IF YOU ARE UNABLE TO OBTAIN WOUND SUPPLIES, CONTINUE TO USE THE SUPPLIES YOU HAVE AVAILABLE UNTIL YOU ARE ABLE TO REACH US. IT IS MOST IMPORTANT TO KEEP THE WOUND COVERED AT ALL TIMES.     Physician Signature:_______________________    Date: ___________ Time:  ____________     Physician: Dr Gretta Ballesteros      [] Patient unable to sign Discharge Instructions given to ECF/Transportation/POA            Electronically signed by Marva Loredo MD on 1/14/2020 at 11:19 AM

## 2020-01-26 NOTE — PROGRESS NOTES
[facility]) Physicians   General & Laparoscopic Surgery  [employer]       HPI:    Elver Bojorquez is very pleasant 61 y.o. female who is referred by Dr. Flavia Hui for wide-mouthed incisional hernia    Recently in hospital for SBO    First SBO since hernia occurred about 36 years ago    Past Medical History:   Diagnosis Date    Arthritis     Diabetes mellitus ([facility]     ESRD (end stage renal disease) ([facility]     Hemodialysis patient ([facility]     Hyperlipidemia     Hypertension     MRSA nasal colonization 07/17/2019    Thyroid disease      Past Surgical History:   Procedure Laterality Date    FOOT SURGERY Left 08/15/2017    LEFT SECOND TOE INCISION AND DEBRIDEMENT OF TISSUE AND BONE WITH OPEN ARTHROPLASTY    INCISION AND DRAINAGE Left 7/19/2019    INCISION AND DRAINAGE OF DEEP ABSCESS OF LEFT LEG AND LEFT SEPTIC  KNEE ARTHROTOMY performed by Norma Ashby MD at [address] [place] [address] Left 11/24/2019    LEFT KNEE INCISION AND DRAINAGE performed by Norma Ashby MD at [place] [address] Left 11/2019    rremoved infection from left knee    LEG SURGERY Left 11/2019    evacuation of hematoma in left calf     OTHER SURGICAL HISTORY Left 08/17/2017    INCISION AND DRAINAGE, 2ND DIGIT AMPUTATION LEFT FOOT     History reviewed. No pertinent family history. Social History     Tobacco Use    Smoking status: Former Smoker     Packs/day: 0.25     Years: 20.00     Pack years: 5.00     Types: Cigarettes     Last attempt to quit: 7/17/2016     Years since quitting: 3.5    Smokeless tobacco: Never Used   Substance Use Topics    Alcohol use: No     I counseled the patient on the importance of not smoking and risks of ETOH. No Known Allergies  Vitals:    01/23/20 0945   BP: (!) 139/46   Pulse: 85   Weight: 269 lb 6.4 oz (122.2 kg)   Height: 5' 7\" (1.702 m)       Body mass index is 42.19 kg/m².       Current Outpatient Medications:     SANTYL 250 UNIT/GM ointment, Apply 1 Dose topically daily, Disp: , Rfl:     ammonium lactate (LAC-HYDRIN) 5 % LOTN lotion, Apply topically 2 times daily, Disp: , Rfl:     bisacodyl (DULCOLAX) 10 MG suppository, Place 10 mg rectally daily as needed for Constipation, Disp: , Rfl:     oxyCODONE 5 MG capsule, Take 10 mg by mouth every 4 hours as needed for Pain., Disp: , Rfl:     penicillin v potassium (VEETID) 500 MG tablet, Take 500 mg by mouth 2 times daily x6 months, Disp: , Rfl:     polyethylene glycol (GLYCOLAX) powder, Take 17 g by mouth 2 times daily as needed, Disp: , Rfl:     Amino Acids-Protein Hydrolys (PRO-STAT) LIQD, Take 30 each by mouth 2 times daily 30 cc bid, Disp: , Rfl:     sennosides-docusate sodium (SENOKOT-S) 8.6-50 MG tablet, Take 2 tablets by mouth 2 times daily, Disp: 60 tablet, Rfl: 3    ferrous sulfate 325 (65 Fe) MG tablet, Take 1 tablet by mouth daily (with breakfast), Disp: 90 tablet, Rfl: 1    famotidine (PEPCID) 20 MG tablet, Take 1 tablet by mouth daily, Disp: 60 tablet, Rfl: 3    aspirin-acetaminophen-caffeine (EXCEDRIN MIGRAINE) 250-250-65 MG per tablet, Take 1 tablet by mouth every 6 hours as needed for Headaches, Disp: , Rfl:     acetaminophen (TYLENOL) 325 MG tablet, Take 650 mg by mouth every 6 hours as needed for Pain, Disp: , Rfl:     bumetanide (BUMEX) 1 MG tablet, Take 2 mg by mouth 2 times daily, Disp: , Rfl:     cyclobenzaprine (FLEXERIL) 10 MG tablet, Take 10 mg by mouth nightly, Disp: , Rfl:     levothyroxine (SYNTHROID) 50 MCG tablet, Take 50 mcg by mouth Daily, Disp: , Rfl:     insulin glargine (LANTUS) 100 UNIT/ML injection vial, Inject 20 Units into the skin 2 times daily , Disp: , Rfl:     insulin lispro (HUMALOG) 100 UNIT/ML injection vial, Inject 5 Units into the skin 3 times daily (before meals) If BG > or = 150, Disp: , Rfl:     B Complex-C-Folic Acid (LILIA CAPS) 1 MG CAPS, Take 1 capsule by mouth daily , Disp: , Rfl:     aspirin 81 MG tablet, Take 81 mg by mouth daily, Disp: , Rfl:     nystatin (MYCOSTATIN) 513076 UNIT/GM ointment, Apply topically 3 times daily Apply topically 2 times daily. , Disp: , Rfl:     triamcinolone (KENALOG) 0.1 % ointment, Apply topically 2 times daily Apply topically 2 times daily. , Disp: , Rfl:     norethindrone (AYGESTIN) 5 MG tablet, Take 5 mg by mouth 2 times daily , Disp: , Rfl:     pravastatin (PRAVACHOL) 20 MG tablet, Take 20 mg by mouth nightly , Disp: , Rfl:     sevelamer (RENVELA) 800 MG tablet, Take 4 tablets by mouth 3 times daily (with meals), Disp: , Rfl:     amLODIPine (NORVASC) 10 MG tablet, Take 10 mg by mouth daily Hold for SBP less than 100, Disp: , Rfl:       Review of Systems - History obtained from the patient  General ROS: negative  Psychological ROS: negative  Ophthalmic ROS: negative  Neurological ROS: negative  ENT ROS: negative  Allergy and Immunology ROS: negative  Hematological and Lymphatic ROS: negative  Endocrine ROS: negative  Respiratory ROS: negative  Cardiovascular ROS: negative  Gastrointestinal ROS: abdominal pain  Genito-Urinary ROS: negative  Musculoskeletal ROS: negative   Skin ROS: negative    Physical Exam   Constitutional: Patient is oriented to person, place, and time. Vital signs are normal. Patient  appears well-developed and well-nourished. Patient  is active and cooperative. Non-toxic appearance. No distress. HENT:   Head: Normocephalic and atraumatic. Head is without laceration. Right Ear: External ear normal. No lacerations. No drainage, swelling or tenderness. Left Ear: External ear normal. No lacerations. No drainage, swelling or tenderness. Nose: Nose normal. No nose lacerations or nasal deformity. Mouth/Throat: Uvula is midline, oropharynx is clear and moist and mucous membranes are normal. No oropharyngeal exudate. Eyes: Conjunctivae, EOM and lids are normal. Pupils are equal, round, and reactive to light. Right eye exhibits no discharge. No foreign body present in the right eye.  Left eye exhibits no

## 2020-01-28 NOTE — PROGRESS NOTES
OTHER SURGICAL HISTORY Left 08/17/2017    INCISION AND DRAINAGE, 2ND DIGIT AMPUTATION LEFT FOOT       FAMILY HISTORY    History reviewed. No pertinent family history.     SOCIAL HISTORY    Social History     Tobacco Use    Smoking status: Former Smoker     Packs/day: 0.25     Years: 20.00     Pack years: 5.00     Types: Cigarettes     Last attempt to quit: 7/17/2016     Years since quitting: 3.5    Smokeless tobacco: Never Used   Substance Use Topics    Alcohol use: No    Drug use: No       ALLERGIES    No Known Allergies    MEDICATIONS    Current Outpatient Medications on File Prior to Encounter   Medication Sig Dispense Refill    penicillin v potassium (VEETID) 500 MG tablet Take 500 mg by mouth 2 times daily x6 months      Amino Acids-Protein Hydrolys (PRO-STAT) LIQD Take 30 each by mouth 2 times daily 30 cc bid      sennosides-docusate sodium (SENOKOT-S) 8.6-50 MG tablet Take 2 tablets by mouth 2 times daily 60 tablet 3    ferrous sulfate 325 (65 Fe) MG tablet Take 1 tablet by mouth daily (with breakfast) 90 tablet 1    famotidine (PEPCID) 20 MG tablet Take 1 tablet by mouth daily 60 tablet 3    cyclobenzaprine (FLEXERIL) 10 MG tablet Take 10 mg by mouth nightly      levothyroxine (SYNTHROID) 50 MCG tablet Take 50 mcg by mouth Daily      insulin glargine (LANTUS) 100 UNIT/ML injection vial Inject 20 Units into the skin 2 times daily       insulin lispro (HUMALOG) 100 UNIT/ML injection vial Inject 5 Units into the skin 3 times daily (before meals) If BG > or = 150      B Complex-C-Folic Acid (LILIA CAPS) 1 MG CAPS Take 1 capsule by mouth daily       aspirin 81 MG tablet Take 81 mg by mouth daily      norethindrone (AYGESTIN) 5 MG tablet Take 5 mg by mouth 2 times daily       pravastatin (PRAVACHOL) 20 MG tablet Take 20 mg by mouth nightly       sevelamer (RENVELA) 800 MG tablet Take 4 tablets by mouth 3 times daily (with meals)      amLODIPine (NORVASC) 10 MG tablet Take 10 mg by mouth daily Hold Volume (cm^3) 0.9 cm^3 1/28/2020  9:00 AM   Wound Healing % 68 1/28/2020  9:00 AM   Post-Procedure Length (cm) 3.1 cm 1/28/2020  9:37 AM   Post-Procedure Width (cm) 3.1 cm 1/28/2020  9:37 AM   Post-Procedure Depth (cm) 0.3 cm 1/28/2020  9:37 AM   Post-Procedure Surface Area (cm^2) 9.61 cm^2 1/28/2020  9:37 AM   Post-Procedure Volume (cm^3) 2.88 cm^3 1/28/2020  9:37 AM   Tunneling Position ___ O'Clock 0 1/14/2020  9:24 AM   Undermining Starts ___ O'Clock 0 1/14/2020  9:24 AM   Undermining Ends___ O'Clock 0 1/28/2020  9:00 AM   Wound Assessment Pink; White 1/28/2020  9:00 AM   Drainage Amount Small 1/28/2020  9:00 AM   Drainage Description Serosanguinous 1/28/2020  9:00 AM   Odor None 1/28/2020  9:00 AM   Margins Defined edges 1/28/2020  9:00 AM   Annamarie-wound Assessment Calloused; Tan 1/28/2020  9:00 AM   Non-staged Wound Description Full thickness 1/28/2020  9:00 AM   [place]%Wound Bed 90 1/28/2020  9:00 AM   Yellow%Wound Bed 15 1/14/2020  9:24 AM   Other%Wound Bed 10 1/28/2020  9:00 AM   Number of days: 14          Percent of Wound(s)/Ulcer(s) Debrided: 100%    Total Surface Area Debrided:  9.61 sq cm       Diabetic/Pressure/Non Pressure Ulcers only:  Ulcer: Diabetic ulcer, fat layer exposed      Estimated Blood Loss:  Minimal    Hemostasis Achieved:  by pressure    Procedural Pain:  0  / 10     Post Procedural Pain:  0 / 10     Response to treatment:  Well tolerated by patient. Plan:       Treatment Note please see attached Discharge Instructions    New Medication(s) at this visit:   New Prescriptions    No medications on file       Other orders at this visit:   Orders Placed This Encounter   Procedures    Wound care       Smoking Cessation: Counseling given: Not Answered      Written patient dismissal instructions given to patient and signed by patient or POA. Discharge [address] [facility] Physician Orders and Discharge Instructions  [facility] [address]  Sal Christine or Lo rolled gauze [] Super Absorbent (i.e. Mariposa Shahnaz)     [] Coban  [] Ace Wrap [] Ace Wrap from forefoot to just below the knee  [] Paper Tape [] Medipore Tape [] Other:    Avoid contact of tape with skin if possible. [x] Change dressing: [] Daily    [x] Every Other Day  [] Three times per week:  [] Monday, Wednesday, Friday  [] Tuesday, Thursday, Saturday   [] Once a week  [] Do Not Change Dressing [] Other:    Off-Loading:   [] Off-loading (keeping weight off as much as you can) when: [] walking  [] in bed [] sitting    [] Total non-weight bearing:  [] Right Leg  [] Left Leg     [] Partial weight bearing:   [] Right Leg  [] Left Leg     [] Heel weight bearing   [] Toe-touch weight bearing  [] Transfers only    [] No Weight bearing restrictions    [x] Assistive Device: [] Walker [] Crutches   [] Wheelchair   [] Roll About   [x] Surgical shoe/Darco [] Podus Boot(s)    [] Heel Protector Soft Boot(s) - DO NOT WALK WHILE WEARING    [] Cast/CAM Boot  [] CROW Boot [] Other:  Use Circaid stockings everyday during the day and off at night. PSO to left 3rd toe twice a day  Dietary:  Important dietary reminders:  1. Increase Protein intake (i.e. Lean meats, fish, eggs, legumes, and yogurt)  2. No added salt  3. If diabetic, follow a diabetic diet and check glucose prior to meals or as instructed by your physician. Dietary Supplements: [] Sohail [] 30ml ProMod/ProStat [] Other:   Take supplements twice a day or as directed as followed: If you are still having pain after you go home:   For wounds on lower legs or arms, elevate the affected limb.  Use over-the-counter medications you would normally use for pain as permitted by your family doctor.  For persistent pain not relieved by the above interventions, please call your family doctor.      Return Appointment:   DME/Wound Dressing Supplies Provided by:   o (Please call them directly to reorder supplies when you run out)   ECF or Home Healthcare:

## 2020-02-11 NOTE — PROGRESS NOTES
Use    Smoking status: Former Smoker     Packs/day: 0.25     Years: 20.00     Pack years: 5.00     Types: Cigarettes     Last attempt to quit: 7/17/2016     Years since quitting: 3.5    Smokeless tobacco: Never Used   Substance Use Topics    Alcohol use: No    Drug use: No       ALLERGIES    No Known Allergies    MEDICATIONS    Current Outpatient Medications on File Prior to Encounter   Medication Sig Dispense Refill    penicillin v potassium (VEETID) 500 MG tablet Take 1 tablet by mouth 2 times daily x6 months 180 tablet 3    oxyCODONE 5 MG capsule Take 10 mg by mouth every 4 hours as needed for Pain.       Amino Acids-Protein Hydrolys (PRO-STAT) LIQD Take 30 each by mouth 2 times daily 30 cc bid      sennosides-docusate sodium (SENOKOT-S) 8.6-50 MG tablet Take 2 tablets by mouth 2 times daily 60 tablet 3    ferrous sulfate 325 (65 Fe) MG tablet Take 1 tablet by mouth daily (with breakfast) 90 tablet 1    famotidine (PEPCID) 20 MG tablet Take 1 tablet by mouth daily 60 tablet 3    bumetanide (BUMEX) 1 MG tablet Take 2 mg by mouth 2 times daily      cyclobenzaprine (FLEXERIL) 10 MG tablet Take 10 mg by mouth nightly      levothyroxine (SYNTHROID) 50 MCG tablet Take 50 mcg by mouth Daily      insulin glargine (LANTUS) 100 UNIT/ML injection vial Inject 20 Units into the skin 2 times daily       insulin lispro (HUMALOG) 100 UNIT/ML injection vial Inject 5 Units into the skin 3 times daily (before meals) If BG > or = 150      B Complex-C-Folic Acid (LILIA CAPS) 1 MG CAPS Take 1 capsule by mouth daily       aspirin 81 MG tablet Take 81 mg by mouth daily      norethindrone (AYGESTIN) 5 MG tablet Take 5 mg by mouth 2 times daily       pravastatin (PRAVACHOL) 20 MG tablet Take 20 mg by mouth nightly       sevelamer (RENVELA) 800 MG tablet Take 4 tablets by mouth 3 times daily (with meals)      amLODIPine (NORVASC) 10 MG tablet Take 10 mg by mouth daily Hold for SBP less than 100      bisacodyl (DULCOLAX) 10 MG suppository Place 10 mg rectally daily as needed for Constipation      polyethylene glycol (GLYCOLAX) powder Take 17 g by mouth 2 times daily as needed      aspirin-acetaminophen-caffeine (EXCEDRIN MIGRAINE) 250-250-65 MG per tablet Take 1 tablet by mouth every 6 hours as needed for Headaches      acetaminophen (TYLENOL) 325 MG tablet Take 650 mg by mouth every 6 hours as needed for Pain      nystatin (MYCOSTATIN) 031076 UNIT/GM ointment Apply topically 3 times daily Apply topically 2 times daily.  triamcinolone (KENALOG) 0.1 % ointment Apply topically 2 times daily Apply topically 2 times daily. No current facility-administered medications on file prior to encounter. REVIEW OF SYSTEMS    A comprehensive review of systems was negative. Last I3K if applicable:   Hemoglobin A1C   Date Value Ref Range Status   12/14/2019 7.0 See comment % Final     Comment:     Comment:  Diagnosis of Diabetes: > or = 6.5%  Increased risk of diabetes (Prediabetes): 5.7-6.4%  Glycemic Control: Nonpregnant Adults: <7.0%                    Pregnant: <6.0%           Objective:      /75   Pulse 99   Temp 98 °F (36.7 °C) (Oral)   Resp 16     Wt Readings from Last 3 Encounters:   01/23/20 269 lb 6.4 oz (122.2 kg)   01/09/20 262 lb 8 oz (119.1 kg)   01/07/20 262 lb (118.8 kg)       PHYSICAL EXAM    General Appearance: alert and oriented to person, place and time, well developed and well- nourished, in no acute distress. Morbid obesity.   Skin: warm and dry, no rash or erythema  Head: normocephalic and atraumatic  Eyes: pupils equal, round, and reactive to light, extraocular eye movements intact, conjunctivae normal  ENT: external ear and ear canal normal bilaterally, nose without deformity,   Neck: supple and non-tender without mass, no thyromegaly  Pulmonary/Chest: clear to auscultation bilaterally-  normal air movement, no respiratory distress  Cardiovascular: normal rate, regular rhythm, normal S1 and S2, no murmurs, rubs, clicks, or gallops,  Abdomen: soft, non-tender, non-distended,   Extremities: no cyanosis, clubbing. Severe edema both legs d/t chronic lymphedema and CVI Left worse than right. Plantar left foot ulcer as scribed. No overt infection. Neurologic:  no cranial nerve deficit, coordination and speech normal      Assessment:      Patient Active Problem List   Diagnosis Code    Infection requiring contact isolation precautions B99.9    End stage renal disease ([facility] N18.6    Leukocytosis D72.829    Osteomyelitis of toe of left foot ([facility] M86.9    Cellulitis of left lower extremity L03. 80    Atherosclerosis of native artery of left lower extremity with ulceration of ankle ([facility]) I70.243    Poorly controlled type 2 diabetes mellitus ([facility] E11.65    PAD (peripheral artery disease) ([facility]) I73.9    Critical lower limb ischemia I99.8    Pain in both lower extremities M79.604, M79.605    Diabetic ulcer of left heel associated with diabetes mellitus due to underlying condition, with fat layer exposed ([facility] Y52.436, L97.422    Localized edema R60.0    Diabetic ulcer of left heel associated with type 2 diabetes mellitus ([facility]) E11.621, L97.429    Neuropathy G62.9    Obesity E66.9    Hematoma of lower extremity, left, initial encounter S80.12XA    Leg swelling M79.89    Hyperkalemia E87.5    Diabetic polyneuropathy associated with type 2 diabetes mellitus ([facility]) E11.42    Pyogenic arthritis of left knee joint ([facility]) M00.9    Abscess of left leg L02.416    Mass of left lower leg R22.42    Diabetes education, encounter for Z71.89    Receiving intravenous antibiotic treatment as outpatient Z79.2    Leg edema, left R60.0    Pain and swelling of left lower extremity M79.605, M79.89    Venous (peripheral) insufficiency I87.2    Lymphedema I89.0    Morbid obesity due to excess calories ([facility]) E66.01    Arthritis, septic, knee ([facility] M00.9    ESRD on hemod[facility] ([facility] N18.6, Z99.2    Diabetic

## 2020-02-12 NOTE — PROGRESS NOTES
Patient called and stated it was okay from nephrology to order lymphedema pumps. Lymph press paperwork started and faxed.

## 2020-02-26 NOTE — PROGRESS NOTES
2/11/2020 10:07 AM   Number of days: 43          Percent of Wound Debrided: 100%    Total Surface Area Debrided:  9 sq cm    Diabetic/Pressure/Non Pressure Ulcers only:  Ulcer: Diabetic ulcer, fat layer exposed    Bleeding: Minimal    Hemostasis Achieved: by pressure    Procedural Pain: 0  / 10     Post Procedural Pain: 0 / 10     Response to treatment:  Well tolerated by patient. Dramatic worsening of wound with significant undermining and callus. Question as to whether she is being properly offloaded with foot wear. Would benefit with lymphedema pumps. I will ask podiatry to see patient due to dramatic change in wound size, Will get blood work and plain film of foot to rule out osteo        Plan:     Treatment Note: Please see attached Discharge Instructions. These instructions were given and signed by the patient or POA    New Medication(s) at this visit:   New Prescriptions    No medications on file       Other orders at this visit:   Orders Placed This Encounter   Procedures    XR FOOT LEFT (MIN 3 VIEWS)    CBC    COMPREHENSIVE METABOLIC PANEL    C-REACTIVE PROTEIN    SEDIMENTATION RATE    PREALBUMIN    Wound care       Discharge Instructions         [address] [facility] Physician Orders and Discharge Instructions  [place] [address]  Telephone: [phone] hands with soap and water prior to and after every dressing change. Wound Cleansing:   · Do not scrub or use excessive force. · With each dressing change, rinse wounds with 0.9% Saline. (May use wound wash or soft contact solution. Both can be purchased at a local drug store). · If unable to obtain saline, may use a gentle soap and water. · Keep wounds dry in the shower unless otherwise instructed by the physician. · For wounds on lower legs, cast covers can be purchased at local drug stores, so that you may shower and keep the wound(s) dry.     Vashe making sure to cover the heel. Remove every night before going to bed. [x] Elevate leg(s) above the level of the heart for 30 minutes 4-5 times a day and/or when sitting. [x] Avoid prolonged standing in one place. Off-Loading:   [] Off-loading (keeping weight off as much as you can) when: []  walking  []  in bed []  sitting    []  Total non-weight bearing:  []  Right Leg  []  Left Leg     []  Partial weight bearing:   []  Right Leg  []  Left Leg     []  Heel weight bearing   []  Toe-touch weight bearing   []  Transfers only    []  No Weight bearing restrictions    [x]  Assistive Device: []  Walker []  Crutches   []  Wheelchair   [] Roll About   []  Surgical shoe/Darco []  Podus Boot(s)    []  Heel Protector Soft Boot(s) - DO NOT WALK WHILE WEARING    []  Cast/CAM Boot  []  CROW Boot [x]  Other: diabetic shoes with orthotic insert    Dietary:  Important dietary reminders:  1. Increase Protein intake (i.e. Lean meats, fish, eggs, legumes, and yogurt)  2. No added salt  3. If diabetic, follow a diabetic diet and check glucose prior to meals or as instructed by your physician. Dietary Supplements: [] Sohail [] 30ml ProMod/ProStat [x] Other:protein supplement   Take supplements twice a day or as directed as followed: If you are still having pain after you go home:   For wounds on lower legs or arms, elevate the affected limb.  Use over-the-counter medications you would normally use for pain as permitted by your family doctor.  For persistent pain not relieved by the above interventions, please call your family doctor. Return Appointment:   DME/Wound Dressing Supplies Provided by:   o (Please call them directly to reorder supplies when you run out)   ECF or Home Healthcare: [facility]   Wound reassessment visit with Nurse :   Alice Banks Return Appointment: With Dr. Luisa Guevara in 1 week.       [x] Orders placed during your visit: No orders of the defined types were placed in this

## 2020-03-05 NOTE — PROGRESS NOTES
of this patient's wound(s) today, sharp excision is required to promote healing and evaluate the extent healing. Performed by: Funmi Tang DPM    Consent obtained? Yes    Time out taken: Yes    Pain Control: Anesthetic: 4% Lidocaine Liquid Topical     Debridement:Excisional Debridement    Using curette the wound was sharply debrided    down through and including the removal of  epidermis, dermis and subcutaneous tissue. Devitalized Tissue Debrided:  fibrin, slough and callus      Pre Debridement Measurements:  Are located in the Wound Documentation Flow Sheet   Wound #: 1     Post  Debridement Measurements:  Wound 01/14/20 Foot Left;Plantar #1 (Active)   Wound Image   2/11/2020 10:07 AM   Wound Diabetic Busby 2 2/11/2020 10:43 AM   Offloading for Diabetic Foot Ulcers Back offloading shoe; Wheelchair 2/11/2020 10:43 AM   Dressing/Treatment Collagen;Hydrofera blue 2/26/2020 11:45 AM   Wound Cleansed Rinsed/Irrigated with saline 2/26/2020 10:38 AM   Wound Length (cm) 3.2 cm 2/26/2020 10:38 AM   Wound Width (cm) 2.9 cm 2/26/2020 10:38 AM   Wound Depth (cm) 0.3 cm 2/26/2020 10:38 AM   Wound Surface Area (cm^2) 9.28 cm^2 2/26/2020 10:38 AM   Change in Wound Size % (l*w) 0.22 2/26/2020 10:38 AM   Wound Volume (cm^3) 2.78 cm^3 2/26/2020 10:38 AM   Wound Healing % 0 2/26/2020 10:38 AM   Post-Procedure Length (cm) 3.3 cm 2/26/2020 11:15 AM   Post-Procedure Width (cm) 3 cm 2/26/2020 11:15 AM   Post-Procedure Depth (cm) 0.5 cm 2/26/2020 11:15 AM   Post-Procedure Surface Area (cm^2) 9.9 cm^2 2/26/2020 11:15 AM   Post-Procedure Volume (cm^3) 4.95 cm^3 2/26/2020 11:15 AM   Tunneling Position ___ O'Clock 0 2/11/2020 10:07 AM   Undermining Starts ___ O'Clock 0100 2/26/2020 10:38 AM   Undermining Ends___ O'Clock 1200 2/26/2020 10:38 AM   Undermining Maxium Distance (cm) 1.0 @ 0900 2/26/2020 10:38 AM   Wound Assessment Pink;Yellow 2/26/2020 10:38 AM   Drainage Amount Moderate 2/26/2020 10:38 AM   Drainage Description Serosanguinous; Yellow 2/26/2020 10:38 AM   Odor None 2/26/2020 10:38 AM   Margins Defined edges 2/26/2020 10:38 AM   Annamarie-wound Assessment Calloused 2/26/2020 10:38 AM   Non-staged Wound Description Full thickness 2/26/2020 10:38 AM   [place]%Wound Bed 80 2/26/2020 10:38 AM   Yellow%Wound Bed 20 2/26/2020 10:38 AM   Other%Wound Bed 0 2/11/2020 10:07 AM   Number of days: 43          Percent of Wound Debrided: 100%    Total Surface Area Debrided:  9.9 sq cm    Diabetic/Pressure/Non Pressure Ulcers only:  Ulcer: Diabetic ulcer, fat layer exposed    Bleeding: Minimal    Hemostasis Achieved: by pressure    Procedural Pain: 0  / 10     Post Procedural Pain: 0 / 10     Response to treatment:  Well tolerated by patient. Plan:   E/M x 30 minutes. Discussed with patient and her  that the wound is not likely offloaded and it is going to challenging as she is a poor candidate for a TCC due to ESRD and chronic lymphedema. Rx CROW walker. Patient was referred to [facility] prosthetics for fabrications. Patient's  relates that she frequently walks around their house with slippers. Discussed at length that she must use her wheelchair at all times as this wound is not likely to heal if it is not completely offloaded as it is on the plantar surface of her calcaneus and she is at risk for a major amputation. Patient became emotional and cried when I mentioned to work amputation. Encouraged patient to follow up with getting her xrays taken to more thoroughly evaluate for underlying OM contributing to nonhealing wound. Treatment Note: Please see attached Discharge Instructions. These instructions were given and signed by the patient or POA    New Medication(s) at this visit:   New Prescriptions    No medications on file       Other orders at this visit:   No orders of the defined types were placed in this encounter.       Discharge [address] [facility] Physician Orders and Discharge Instructions  [address] [place] [address]  Telephone: [phone] hands with soap and water prior to and after every dressing change. Wound Cleansing:   · Do not scrub or use excessive force. · With each dressing change, rinse wounds with 0.9% Saline. (May use wound wash or soft contact solution. Both can be purchased at a local drug store). · If unable to obtain saline, may use a gentle soap and water. · Keep wounds dry in the shower unless otherwise instructed by the physician. · For wounds on lower legs, cast covers can be purchased at local drug stores, so that you may shower and keep the wound(s) dry. Vashe wound cleanser:  [] Instructions for Vashe Wash solution ONLY: Apply enough Vashe to soak a piece of gauze and place on wound bed for 5-10 minutes. DO NOT rinse after Vashe has been applied. Follow dressing application as instructed below.     Topical Treatments:  Do not apply lotions, creams, or ointments to the skin around the wound bed unless directed as followed:     [] Apply around the wound: [] moisturizing lotion [] Antifungal ointment [] No-Sting barrier film [] Zinc paste [] Other:  [] Apply to affected limb:      Dressings:           Wound Location: Left Foot      [x] Apply Primary Dressing to wound:       [] Foam/Foam with Border(i.e Mepilex) [] Non-adherent (i.e.Mepitel)   [] Alginate with Silver (i.e. Silvercel) [] Alginate (i.e. Aquacel)   [x] Collagen (i.e. Puracol)   [] Collagen with Silver(i.e. Dalila)     [] Hydrocolloid    [] Hydrafera Blue moistened with saline   [] MediHoney Paste/Gel   [x] Hydrogel    [] Endoform      [] Santyl covered with gauze moisten with saline     [] Betadine   [] Bactroban/Mupirocin   [] Polysporin/Neosporin  [] Other:    [] Saline/Vashe \"wet to dry\" gauze     Pack wound loosely with: [] Iodoform   [] Plain Packing  [] Other:      [x] Cover and Secure with:     [x] the additional information attached to these instructions if included. If you are still having pain after you go home:   For wounds on lower legs or arms, elevate the affected limb.  Use over-the-counter medications you would normally use for pain as permitted by your family doctor.  For persistent pain not relieved by the above interventions, please call your family doctor. Return Appointment:   DME/Wound Dressing Supplies Provided by:   o (Please call them directly to reorder supplies when you run out)   ECF or Home Healthcare:    Wound reassessment visit with Nurse :   [facility] Return Appointment: With Dr Aftab Fields  in  [facility]). [x] Orders placed during your visit:   Orders Placed This Encounter   Procedures    Wound care         Your nurse  is:  Litzy Sequeira     Electronically signed by Natalia Estevez RN on 3/3/2020 at 5:20 PM     [address]: Should you experience any significant changes in your wound(s) or have questions about your wound care, please contact the [address] [place] [address] at [phone]. Our hours vary so please leave a message. Please give us 24-48 hours to return your call. Call your doctor now or seek immediate medical care if:    · You have symptoms of infection, such as:  ? Increased pain, swelling, warmth, or redness. ? Red streaks leading from the area. ? Pus draining from the area. ? A fever. PLEASE NOTE: IF YOU ARE UNABLE TO OBTAIN WOUND SUPPLIES, CONTINUE TO USE THE SUPPLIES YOU HAVE AVAILABLE UNTIL YOU ARE ABLE TO REACH US. IT IS MOST IMPORTANT TO KEEP THE WOUND COVERED AT ALL TIMES.     Physician Signature:_______________________    Date: ___________ Time:  ____________     Physician: Dr Genia Bañuelos      [] Patient unable to sign Discharge Instructions given to ECF/Transportation/POA          Electronically signed by Genia Bañuelos DPM on 3/5/2020 at 10:52 AM

## 2020-03-10 NOTE — CARE COORDINATION
Wound care orders faxed to [facility] [phone]  Fax: [phone]

## 2020-03-10 NOTE — PLAN OF CARE
[address]  Plan Summary    Wound Type: diabetic Delories Inches grade 2). Orders from today's visit:  New Medication(s) at this visit:   New Prescriptions    No medications on file       Other orders at this visit:   Orders Placed This Encounter   Procedures    Wound care       Vascular Status (if lower extremity wound):     [address] JANETT in past year (counting back from 3/10/20):   No data recorded, No data recorded    Vascular Test:   Left Impression   Laser Skin Perfusion Pressure study at the left dorsum of the foot is 52 mmHg;   wound healing likely. This is considered to have a good probability for   healing. Laser Skin Perfusion Pressure study at the left lateral plantar area of the   foot is 62 mmHg: wound healing likely. This is considered to have a good   probability for healing. Pulse Volume Recording at the transmetatarsal level reveals moderately   abnormal waveforms.       Conclusions        Summary        Good probability of wound healing bilaterally.        Signature        ------------------------------------------------------------------    Electronically signed by Marica Fleischer, MD (Interpreting    [id number]) on 07/18/2019 at 07:04 PM    ------------------------------------------------------------------         Offloading: , wheelchair. Appointment with Cecille Herman 3/31 to get fitted for CROW boot. Edema Control: Circaid, in process of getting lymphedema pumps    Present Dressing: Wound 01/14/20 Foot Left;Plantar #1-Dressing/Treatment: Collagen, Hydrofera blue      Past Dressings: as above.      Infection/Imaging:  Lab Results   Component Value Date    CRP 98.2 (H) 11/24/2019     Lab Results   Component Value Date    SEDRATE 108 (H) 11/24/2019       Culture taken:      Infection status(flagged):   Patient Infection Status     Infection Onset Added Last Indicated Last Indicated By Review Planned Expiration Resolved Resolved By    ESBL (Extended Spectrum Beta Lactamase) 12/16/19 12/20/19 12/16/19 Wound Culture        Resolved    MRSA 07/17/19 07/18/19 07/17/19 MRSA DNA Probe, Nasal   07/19/19 Jared Lemon RN           Nutrition/Diabetes Control:     Lab Results   Component Value Date    LABA1C 7.0 12/14/2019        Supplements: Pro-Stat    Referrals/Consults: Weight Management done by Dr Jf Betancur but pt declined due to surgery for her leg. Revisit this referral in near future.     Home Care/DME: [facility] [phone]  Fax: [phone]      PCP: Grace Guillen

## 2020-03-11 NOTE — PROGRESS NOTES
[facility] [place]      Davin Anguiano  MEDICAL RECORD NUMBER:  [id number]  AGE: 61 y.o. GENDER: female  : 1960  EPISODE DATE:  3/10/2020    Subjective:     Chief Complaint   Patient presents with    Wound Check     left heel         HISTORY of PRESENT ILLNESS HPI     Davin Anguiano is a 61 y.o. female who presents today for wound/ulcer evaluation. History of Wound Context: Patient of Dr. Gertrudis Cabrera who is morbidly obese diabetic pt with ESRD/HD/Neuropathy. Bilateral leg edema. Has diabetic plantar left heel ulcer which has reopened. Has markedly increased edema left leg. Recent venous study negative for DVT but limited d/t body habitus. She claims to be wearing diabetic shoes. Patient presents today with her  and he relates that she is going to be fitted for lymphedema pumps to better manage her edema. Pain Assessment:  Wound/Ulcer Pain Timing/Severity: none  Quality of pain: N/A  Severity:  0 / 10   Modifying Factors: None  Associated Signs/Symptoms: edema    Ulcer Identification:  Ulcer Type: diabetic and neuropathic  Contributing Factors: edema, venous stasis, lymphedema, diabetes, decreased mobility and obesity    Objective:      BP (!) 181/83   Pulse 96   Temp 97.8 °F (36.6 °C) (Oral)   Resp 16     Wt Readings from Last 3 Encounters:   20 269 lb 6.4 oz (122.2 kg)   20 262 lb 8 oz (119.1 kg)   20 262 lb (118.8 kg)       PHYSICAL EXAM    Extremities: 4 +nonpitting edema-  bilateral lower extremities left>>right. Wound noted on the plantar aspect of the left foot. Wound has fibrotic and nonviable tissue that extends down through and includes the subcutaneous tissue. After debridement the wound has a granular base. There is no surrounding erythema, edema, warmth or malodor noted. The wound does not probe or track to bone. Assessment:      No diagnosis found.      Procedure Note  Indications:  Based on my examination of this patient's wound(s) today, sharp excision is required to promote healing and evaluate the extent healing. Performed by: Sherice Garland DPM    Consent obtained? Yes    Time out taken: Yes    Pain Control: Anesthetic: 4% Lidocaine Liquid Topical     Debridement:Excisional Debridement    Using curette the wound was sharply debrided    down through and including the removal of  epidermis, dermis and subcutaneous tissue. Devitalized Tissue Debrided:  fibrin, slough and callus      Pre Debridement Measurements:  Are located in the Wound Documentation Flow Sheet   Wound #: 1     Post  Debridement Measurements:  Wound 01/14/20 Foot Left;Plantar #1 (Active)   Wound Image   3/10/2020  4:29 PM   Wound Diabetic Busby 2 2/11/2020 10:43 AM   Offloading for Diabetic Foot Ulcers Back offloading shoe; Wheelchair 2/11/2020 10:43 AM   Dressing/Treatment Collagen;Hydrofera blue 3/10/2020  5:15 PM   Wound Cleansed Rinsed/Irrigated with saline 3/10/2020  4:29 PM   Wound Length (cm) 4.5 cm 3/10/2020  4:29 PM   Wound Width (cm) 5.5 cm 3/10/2020  4:29 PM   Wound Depth (cm) 0.2 cm 3/10/2020  4:29 PM   Wound Surface Area (cm^2) 24.75 cm^2 3/10/2020  4:29 PM   Change in Wound Size % (l*w) -166.13 3/10/2020  4:29 PM   Wound Volume (cm^3) 4.95 cm^3 3/10/2020  4:29 PM   Wound Healing % -77 3/10/2020  4:29 PM   Post-Procedure Length (cm) 4.6 cm 3/10/2020  4:53 PM   Post-Procedure Width (cm) 5.6 cm 3/10/2020  4:53 PM   Post-Procedure Depth (cm) 0.4 cm 3/10/2020  4:53 PM   Post-Procedure Surface Area (cm^2) 25.76 cm^2 3/10/2020  4:53 PM   Post-Procedure Volume (cm^3) 10.3 cm^3 3/10/2020  4:53 PM   Tunneling Position ___ O'Clock 0 2/11/2020 10:07 AM   Undermining Starts ___ O'Clock 0100 3/3/2020  4:30 PM   Undermining Ends___ O'Clock 1100 3/3/2020  4:30 PM   Undermining Maxium Distance (cm) 3.2 @ 0900 3/3/2020  4:30 PM   Wound Assessment Pink;Yellow 3/10/2020  4:29 PM   Drainage Amount Moderate 3/10/2020  4:29 PM   Drainage instructions if included. If you are still having pain after you go home:   For wounds on lower legs or arms, elevate the affected limb.  Use over-the-counter medications you would normally use for pain as permitted by your family doctor.  For persistent pain not relieved by the above interventions, please call your family doctor. Return Appointment:   DME/Wound Dressing Supplies Provided by:   o (Please call them directly to reorder supplies when you run out)   ECF or Home Healthcare:    Wound reassessment visit with Nurse :   Kj Return Appointment: With Dr Karthik Jameson  in  [facility]). [x] Orders placed during your visit:   Orders Placed This Encounter   Procedures    Wound care         Your nurse  is:  Julianne Guevara     Electronically signed by Zachary Guillaume RN on 3/10/2020 at 4:56 PM     [address] [facility] Information: Should you experience any significant changes in your wound(s) or have questions about your wound care, please contact the [address] [place] [address] at [phone]. Our hours vary so please leave a message. Please give us 24-48 hours to return your call. Call your doctor now or seek immediate medical care if:    · You have symptoms of infection, such as:  ? Increased pain, swelling, warmth, or redness. ? Red streaks leading from the area. ? Pus draining from the area. ? A fever. PLEASE NOTE: IF YOU ARE UNABLE TO OBTAIN WOUND SUPPLIES, CONTINUE TO USE THE SUPPLIES YOU HAVE AVAILABLE UNTIL YOU ARE ABLE TO REACH US. IT IS MOST IMPORTANT TO KEEP THE WOUND COVERED AT ALL TIMES.     Physician Signature:_______________________    Date: ___________ Time:  ____________     Physician: Dr Rizwana Soria      [] Patient unable to sign Discharge Instructions given to ECF/Transportation/POA          Electronically signed by Rizwana Soria DPM on 3/11/2020 at 9:09 AM

## 2020-03-16 NOTE — PROGRESS NOTES
Lidocaine Liquid Topical     Debridement:Excisional Debridement    Using curette the wound was sharply debrided    down through and including the removal of  epidermis, dermis and subcutaneous tissue. Devitalized Tissue Debrided:  fibrin, biofilm and slough      Pre Debridement Measurements:  Are located in the Wound Documentation Flow Sheet   Wound #: 1     Post  Debridement Measurements:  Wound 01/14/20 Foot Left;Plantar #1 (Active)   Wound Image   3/10/2020  4:29 PM   Wound Diabetic Busby 2 2/11/2020 10:43 AM   Offloading for Diabetic Foot Ulcers Back offloading shoe; Wheelchair 2/11/2020 10:43 AM   Dressing/Treatment Collagen;Hydrofera blue 3/10/2020  5:15 PM   Wound Cleansed Rinsed/Irrigated with saline 3/16/2020 11:22 AM   Wound Length (cm) 4.5 cm 3/16/2020 11:22 AM   Wound Width (cm) 5 cm 3/16/2020 11:22 AM   Wound Depth (cm) 0.3 cm 3/16/2020 11:22 AM   Wound Surface Area (cm^2) 22.5 cm^2 3/16/2020 11:22 AM   Change in Wound Size % (l*w) -141.94 3/16/2020 11:22 AM   Wound Volume (cm^3) 6.75 cm^3 3/16/2020 11:22 AM   Wound Healing % -142 3/16/2020 11:22 AM   Post-Procedure Length (cm) 4.6 cm 3/10/2020  4:53 PM   Post-Procedure Width (cm) 5.6 cm 3/10/2020  4:53 PM   Post-Procedure Depth (cm) 0.4 cm 3/10/2020  4:53 PM   Post-Procedure Surface Area (cm^2) 25.76 cm^2 3/10/2020  4:53 PM   Post-Procedure Volume (cm^3) 10.3 cm^3 3/10/2020  4:53 PM   Distance Tunneling (cm) 0 cm 3/16/2020 11:22 AM   Tunneling Position ___ O'Clock 0 2/11/2020 10:07 AM   Undermining Starts ___ O'Clock 0100 3/3/2020  4:30 PM   Undermining Ends___ O'Clock 1100 3/3/2020  4:30 PM   Undermining Maxium Distance (cm) 0 3/16/2020 11:22 AM   Wound Assessment Pink;Yellow 3/16/2020 11:22 AM   Drainage Amount Moderate 3/16/2020 11:22 AM   Drainage Description Yellow;Brown 3/16/2020 11:22 AM   Odor Mild 3/16/2020 11:22 AM   Margins Defined edges 3/16/2020 11:22 AM   Annamarie-wound Assessment Pink 3/16/2020 11:22 AM   Non-staged Wound Description Full thickness 3/16/2020 11:22 AM   [place]%Wound Bed 60 3/16/2020 11:22 AM   Yellow%Wound Bed 40 3/16/2020 11:22 AM   Other%Wound Bed 0 2/11/2020 10:07 AM   Number of days: 62          Percent of Wound Debrided: 100%    Total Surface Area Debrided:  25 sq cm    Diabetic/Pressure/Non Pressure Ulcers only:  Ulcer: Diabetic ulcer, fat layer exposed    Bleeding: Minimal    Hemostasis Achieved: by pressure    Procedural Pain: 0  / 10     Post Procedural Pain: 0 / 10     Response to treatment:  Well tolerated by patient. Still awaiting appointment to prosthetist for Brenda boot. Also waiting for lymphedema pumps        Plan:     Treatment Note: Please see attached Discharge Instructions. These instructions were given and signed by the patient or POA    New Medication(s) at this visit:   New Prescriptions    No medications on file       Other orders at this visit:   Orders Placed This Encounter   Procedures    Wound care       Discharge [address] [facility] Physician Orders and Discharge Instructions  [facility] [place] [address] [place] [address], [address] [place] [address]  Telephone: [phone] hands with soap and water prior to and after every dressing change. Wound Cleansing:   · Do not scrub or use excessive force. · With each dressing change, rinse wounds with 0.9% Saline. (May use wound wash or soft contact solution. Both can be purchased at a local drug store). · If unable to obtain saline, may use a gentle soap and water. · Keep wounds dry in the shower unless otherwise instructed by the physician. · For wounds on lower legs, cast covers can be purchased at local drug stores, so that you may shower and keep the wound(s) dry. Vashe wound cleanser:  [] Instructions for Vashe Wash solution ONLY: Apply enough Vashe to soak a piece of gauze and place on wound bed for 5-10 minutes. DO NOT rinse after Vashe has been applied.   Follow dressing application as warmth, or redness. ? Red streaks leading from the area. ? Pus draining from the area. ? A fever. PLEASE NOTE: IF YOU ARE UNABLE TO OBTAIN WOUND SUPPLIES, CONTINUE TO USE THE SUPPLIES YOU HAVE AVAILABLE UNTIL YOU ARE ABLE TO REACH US. IT IS MOST IMPORTANT TO KEEP THE WOUND COVERED AT ALL TIMES.     Physician Signature:_______________________    Date: ___________ Time:  ____________     Physician: Dr. Wally hSepherd      [] Patient unable to sign Discharge Instructions given to ECF/Transportation/POA        Electronically signed by Amor Ortiz MD on 3/16/2020 at 12:51 PM

## 2020-03-24 NOTE — PROGRESS NOTES
The wound does not probe or track to bone. Assessment:      No diagnosis found. Procedure Note  Indications:  Based on my examination of this patient's wound(s) today, sharp excision is required to promote healing and evaluate the extent healing. Performed by: Hafsa Mcelroy DPM    Consent obtained? Yes    Time out taken: Yes    Pain Control: Anesthetic: 4% Lidocaine Liquid Topical     Debridement:Excisional Debridement    Using curette the wound was sharply debrided    down through and including the removal of  epidermis, dermis and subcutaneous tissue. Devitalized Tissue Debrided:  fibrin, slough and callus      Pre Debridement Measurements:  Are located in the Wound Documentation Flow Sheet   Wound #: 1     Post  Debridement Measurements:  Wound 01/14/20 Foot Left;Plantar #1 (Active)   Wound Image   3/10/2020  4:29 PM   Wound Diabetic Busby 2 2/11/2020 10:43 AM   Offloading for Diabetic Foot Ulcers Back offloading shoe; Wheelchair 2/11/2020 10:43 AM   Dressing/Treatment Collagen;Hydrofera blue 3/24/2020  5:30 PM   Wound Cleansed Rinsed/Irrigated with saline 3/16/2020 11:22 AM   Wound Length (cm) 4.5 cm 3/24/2020  4:47 PM   Wound Width (cm) 5 cm 3/24/2020  4:47 PM   Wound Depth (cm) 0.1 cm 3/24/2020  4:47 PM   Wound Surface Area (cm^2) 22.5 cm^2 3/24/2020  4:47 PM   Change in Wound Size % (l*w) -141.94 3/24/2020  4:47 PM   Wound Volume (cm^3) 2.25 cm^3 3/24/2020  4:47 PM   Wound Healing % 19 3/24/2020  4:47 PM   Post-Procedure Length (cm) 4.6 cm 3/24/2020  5:45 PM   Post-Procedure Width (cm) 5.1 cm 3/24/2020  5:45 PM   Post-Procedure Depth (cm) 0.3 cm 3/24/2020  5:45 PM   Post-Procedure Surface Area (cm^2) 23.46 cm^2 3/24/2020  5:45 PM   Post-Procedure Volume (cm^3) 7.04 cm^3 3/24/2020  5:45 PM   Distance Tunneling (cm) 0 cm 3/24/2020  4:47 PM   Tunneling Position ___ O'Clock 0 3/24/2020  4:47 PM   Undermining Starts ___ O'Clock 0100 3/3/2020  4:30 PM   Undermining Ends___ O'Clock 1100 3/3/2020  4:30 PM Undermining Maxium Distance (cm) 0 3/16/2020 11:22 AM   Wound Assessment Pink;Slough; Yellow 3/24/2020  4:47 PM   Drainage Amount Moderate 3/24/2020  4:47 PM   Drainage Description Brown;Serosanguinous 3/24/2020  4:47 PM   Odor None 3/24/2020  4:47 PM   Margins Defined edges 3/24/2020  4:47 PM   Annamarie-wound Assessment Maceration; White 3/24/2020  4:47 PM   Non-staged Wound Description Full thickness 3/24/2020  4:47 PM   [place]%Wound Bed 60 3/24/2020  4:47 PM   Yellow%Wound Bed 40 3/24/2020  4:47 PM   Other%Wound Bed 0 2/11/2020 10:07 AM   Number of days: 70          Percent of Wound Debrided: 100%    Total Surface Area Debrided:  23.46 sq cm    Diabetic/Pressure/Non Pressure Ulcers only:  Ulcer: Diabetic ulcer, fat layer exposed    Bleeding: Minimal    Hemostasis Achieved: by pressure    Procedural Pain: 0  / 10     Post Procedural Pain: 0 / 10     Response to treatment:  Well tolerated by patient. Plan:   E/M x 30 minutes. Discussed with patient and her  that the wound is not likely offloaded and it is going to challenging as she is a poor candidate for a TCC due to ESRD and chronic lymphedema. Follow up with getting CROW walker. Patient was referred to [facility] prosthetics for fabrications. Patient's  relates that he has made an appointment for her to get CROW walker. Patient's  relates that she frequently walks around their house with slippers. Discussed at length that she must use her wheelchair at all times as this wound is not likely to heal if it is not completely offloaded as it is on the plantar surface of her calcaneus and she is at risk for a major amputation. Treatment Note: Please see attached Discharge Instructions. These instructions were given and signed by the patient or POA    RTC 2 weeks to decrease the risk of exposure in light of the current COVID-19 pandemic, as she is at risk for complications should she contract the virus.   Patient has home health care and was blue                                 []? Collagen with Silver(i.e. Dalila)                []? Hydrocolloid                                               []? Hydrafera Blue moistened with saline              []? MediHoney Paste/Gel                                []? Hydrogel                                         []? Endoform                                 []? Santyl covered with gauze moisten with saline                                                     []? Betadine              []? Bactroban/Mupirocin                                  []? Polysporin/Neosporin                     []? Other:               []? Saline/Vashe \"wet to dry\" gauze      Pack wound loosely with: []? Iodoform   []? Plain Packing            []? Saline \"wet to dry\"       [x]? Cover and Secure with:                [x]? Dry Gauze              []? ABD                        []? Cast padding                      [x]? Kerlix or Lo rolled gauze    []? Super Absorbent (i.e. Optilock)                []? Coban                     []? Ace Wrap   []? Ace Wrap from forefoot to just below the knee               []? Paper Tape            []? Medipore Tape       []? Other:               Avoid contact of tape with skin if possible.     [x]? Change dressing: []? Daily           []? Every Other Day                [x]? Three times per week:   [x]? Monday, Wednesday, Friday        []? Tuesday, Thursday, Saturday              []? Once a week                      []? Do Not Change Dressing   []? Other:                      Edema Control:                  [x]? Elevate leg(s) above the level of the heart for 30 minutes 4-5 times a day and/or when sitting. [x]? Avoid prolonged standing in one place.         Off-Loading:   []? Off-loading (keeping weight off as much as you can) when:       []?  walking      []? in bed     []?  sitting     [x]? Total non-weight bearing:  []? Right Leg  [x]? Left Leg                []?  Partial weight bearing:   []?   Right

## 2020-03-25 NOTE — CARE COORDINATION
Wound care orders faxed to [facility] [phone]  Fax: [phone]

## 2020-04-07 NOTE — PROGRESS NOTES
bone.      Assessment:      No diagnosis found. Procedure Note  Indications:  Based on my examination of this patient's wound(s) today, sharp excision is required to promote healing and evaluate the extent healing. Performed by: Audra Irvin DPM    Consent obtained? Yes    Time out taken: Yes    Pain Control:       Debridement:Excisional Debridement    Using curette the wound was sharply debrided    down through and including the removal of  epidermis, dermis and subcutaneous tissue. Devitalized Tissue Debrided:  fibrin, slough and callus      Pre Debridement Measurements:  Are located in the Wound Documentation Flow Sheet   Wound #: 1     Post  Debridement Measurements:  Wound 01/14/20 Foot Left;Plantar #1 (Active)   Wound Image   3/10/2020  4:29 PM   Wound Diabetic Busby 2 2/11/2020 10:43 AM   Offloading for Diabetic Foot Ulcers Back offloading shoe; Wheelchair 2/11/2020 10:43 AM   Dressing/Treatment Collagen;Hydrofera blue 3/24/2020  5:30 PM   Wound Cleansed Rinsed/Irrigated with saline 4/7/2020  3:09 PM   Wound Length (cm) 4.5 cm 4/7/2020  3:09 PM   Wound Width (cm) 4.5 cm 4/7/2020  3:09 PM   Wound Depth (cm) 0.1 cm 4/7/2020  3:09 PM   Wound Surface Area (cm^2) 20.25 cm^2 4/7/2020  3:09 PM   Change in Wound Size % (l*w) -117.74 4/7/2020  3:09 PM   Wound Volume (cm^3) 2.02 cm^3 4/7/2020  3:09 PM   Wound Healing % 28 4/7/2020  3:09 PM   Post-Procedure Length (cm) 4.6 cm 4/7/2020  3:25 PM   Post-Procedure Width (cm) 4.6 cm 4/7/2020  3:25 PM   Post-Procedure Depth (cm) 0.3 cm 4/7/2020  3:25 PM   Post-Procedure Surface Area (cm^2) 21.16 cm^2 4/7/2020  3:25 PM   Post-Procedure Volume (cm^3) 6.35 cm^3 4/7/2020  3:25 PM   Distance Tunneling (cm) 0 cm 3/24/2020  4:47 PM   Tunneling Position ___ O'Clock 0 3/24/2020  4:47 PM   Undermining Starts ___ O'Clock 0100 3/3/2020  4:30 PM   Undermining Ends___ O'Clock 1100 3/3/2020  4:30 PM   Undermining Maxium Distance (cm) 0 3/16/2020 11:22 AM   Wound Assessment

## 2020-04-08 PROBLEM — I21.21 ACUTE ST ELEVATION MYOCARDIAL INFARCTION (STEMI) INVOLVING LEFT CIRCUMFLEX CORONARY ARTERY (HCC): Status: ACTIVE | Noted: 2020-01-01

## 2020-04-08 PROBLEM — I21.19 ACUTE MI, INFEROLATERAL WALL, INITIAL EPISODE OF CARE (HCC): Status: ACTIVE | Noted: 2020-01-01

## 2020-04-08 NOTE — H&P
ICU HISTORY AND [facility] Day:   ICU Day:         Code:Full Code  Admit Date: 4/8/2020       PCP: Saad Paz                                  CC: Chest pain    HISTORY OF PRESENT ILLNESS:     Patient is a 62 yo female with a history of ESRD (HD MWF [facility] Gue[facility]a), DM2 (insulin-dependent, c/b foot ulcers), PAD, HTN, HLD and left septic knee joint who presented with left-sided chest pain after getting home from dialysis. She endorsed around 2 hours of pain at the time of presentation to the ED. Pain 10/10. Description of quality of pain varied. Did not radiate. Patient initially denied shortness of breath but reported that she could not breath while lying flat. In the ED, ECG showed ST elevation in the lateral leads. Patient was given aspirin and taken to the cath lab. In cath lab, patient was found to have severe occlusion of the right circumferential artery. Stent could not be placed. Patient went into respiratory failure during procedure and was intubated with propofol. Pressures were soft, so she was started on phenylephrine ggt. Patient transferred to the ICU with plan for likely CABG.       PAST HISTORY:     Past Medical History:   Diagnosis Date    Arthritis     Diabetes mellitus ([facility]     ESRD (end stage renal disease) ([facility]     Hemodialysis patient ([facility]     Hyperlipidemia     Hypertension     MRSA nasal colonization 07/17/2019    Thyroid disease        Past Surgical History:   Procedure Laterality Date    FOOT SURGERY Left 08/15/2017    LEFT SECOND TOE INCISION AND DEBRIDEMENT OF TISSUE AND BONE WITH OPEN ARTHROPLASTY    INCISION AND DRAINAGE Left 7/19/2019    INCISION AND DRAINAGE OF DEEP ABSCESS OF LEFT LEG AND LEFT SEPTIC  KNEE ARTHROTOMY performed by Donna Troy MD at [address] [place] [address] Left 11/24/2019    LEFT KNEE INCISION AND DRAINAGE performed by Donna Troy MD at [facility] Left 11/2019    rremoved infection from left knee Acid (LILIA CAPS) 1 MG CAPS Take 1 capsule by mouth daily       aspirin 81 MG tablet Take 81 mg by mouth daily      nystatin (MYCOSTATIN) 647980 UNIT/GM ointment Apply topically 3 times daily Apply topically 2 times daily.  triamcinolone (KENALOG) 0.1 % ointment Apply topically 2 times daily Apply topically 2 times daily.  norethindrone (AYGESTIN) 5 MG tablet Take 5 mg by mouth 2 times daily       pravastatin (PRAVACHOL) 20 MG tablet Take 20 mg by mouth nightly       sevelamer (RENVELA) 800 MG tablet Take 4 tablets by mouth 3 times daily (with meals)      amLODIPine (NORVASC) 10 MG tablet Take 10 mg by mouth daily Hold for SBP less than 100           Scheduled Meds:   aspirin  324 mg Oral Once    nystatin   Topical TID    sodium chloride flush  10 mL Intravenous 2 times per day    heparin (porcine)  4,000 Units Intravenous Once    ampicillin IV  500 mg Intravenous Q12H      Continuous Infusions:   cisatracurium (NIMBEX) infusion      heparin (porcine)      tirofiban      propofol      dextrose      insulin (HUMAN R) non-weight based infusion       PRN Meds:    Allergies: No Known Allergies    REVIEW OF SYSTEMS:     ROS: Pertinent positives and negatives as noted in the HPI    PHYSICAL EXAM:   Vitals: /66   Pulse 79   Temp 98.2 °F (36.8 °C) (Oral)   Resp 26   Wt 249 lb 1.9 oz (113 kg)   BMI 39.02 kg/m²     I/O:  No intake or output data in the 24 hours ending 04/08/20 1857  No intake/output data recorded. No intake/output data recorded. Physical Exam  Constitutional:       Comments: Sedated. Morbidly obese. Cardiovascular:      Rate and Rhythm: Normal rate. Comments: Unable to accurately assess in the setting of loud upper breath sounds  Pulmonary:      Comments: On ventilator. Wheezing diffusely in the right lung. Musculoskeletal:         General: Swelling present. Right lower leg: Edema present. Left lower leg: Edema present.       Comments: Bilateral non-pitting edema   Neurological:      Comments: Paralyzed on Nimbex         Access:   -Central Access Day #: 1                                  -Peripheral Access Day#:1  -Arterial line Day#:1                                  [place] Day#:1  NGT Day#: NA                                            ETT Day#:1  Vent Settings: Vent Mode: AC/PRVC Rate Set: 26 bmp/Vt Ordered: 450 mL/ /FiO2 : 80 %    Recent Labs     04/08/20  1708 04/08/20  1736   PHART 7.306* 7.339*   PFF6DRH 68.2* 58.3*   PO2ART 68.3* 236.6*       DATA:   Labs:  CBC:   Recent Labs     04/08/20  1534   WBC 7.4   HGB 10.9*   HCT 31.7*          BMP:   Recent Labs     04/08/20  1534 04/08/20  1708     --    K 4.4  --    CL 93*  --    CO2 30  --    BUN 29*  --    CREATININE 5.5* 5.8*   GLUCOSE 99  --      LFT's: No results for input(s): AST, ALT, ALB, BILITOT, ALKPHOS in the last 72 hours. Troponin:   Recent Labs     04/08/20  1534   TROPONINI 0.24*     BNP:No results for input(s): BNP in the last 72 hours. ABGs:   Recent Labs     04/08/20  1708 04/08/20  1736   PHART 7.306* 7.339*   KTI0EYI 68.2* 58.3*   PO2ART 68.3* 236.6*     INR:   Recent Labs     04/08/20  1534   INR 1.08       U/A:No results for input(s): NITRITE, COLORU, PHUR, LABCAST, WBCUA, RBCUA, MUCUS, TRICHOMONAS, YEAST, BACTERIA, CLARITYU, SPECGRAV, LEUKOCYTESUR, UROBILINOGEN, BILIRUBINUR, BLOODU, GLUCOSEU, AMORPHOUS in the last 72 hours. Invalid input(s): KETONESU    XR CHEST PORTABLE    (Results Pending)       ASSESSMENT AND PLAN:   Yris Francis is a 61 y.o. female with a history of ESRD (HD MWF [facility]), DM2 (c/b foot ulcers), PAD, HTN, HLD and left septic knee joint who presented with left-sided chest pain after getting home from dialysis.     STEMI, status post LHC and PCI  - Continue Aggrastat ggt and heparin drip  - Will plan for CABG    Hypercarbic/hypoxic respiratory Failure (with mixed metabolic and respiratory acidosis)  Required urgent intubation and ventilatory support

## 2020-04-08 NOTE — H&P
tablet Take 4 tablets by mouth 3 times daily (with meals)    Historical Provider, MD   amLODIPine (NORVASC) 10 MG tablet Take 10 mg by mouth daily Hold for SBP less than 100    Historical Provider, MD        Hospital Medications:   aspirin  324 mg Oral Once        cisatracurium (NIMBEX) infusion      phenylephrine (DAVIS-SYNEPHRINE) 50mg/250mL infusion         Allergies:  Patient has no known allergies. Review of Systems:     A 14 ROS obtained and negative except as mentioned in HPI. · Constitutional: there has been no unanticipated weight loss. · Eyes: No visual changes or diplopia. No scleral icterus. · ENT: No Headaches, hearing loss or vertigo. No mouth sores or sore throat. · Cardiovascular: No loss of consciousness. No hemoptysis, pleuritic pain, or phlebitis. CP  · Respiratory: No cough or wheezing, no sputum production. No hematemesis. SOB  · Gastrointestinal: No abdominal pain, appetite loss, blood in stools. No change in bowel or bladder habits. · Genitourinary: No dysuria, or hematuria. · Musculoskeletal:  No gait disturbance, weakness or joint complaints. · Integumentary: No rash or pruritis. · Neurological: No headache, diplopia,numbness or tingling. No change in gait, balance, coordination, mood, affect, memory, mentation, behavior. · Psychiatric: No anxiety,  · Endocrine: No malaise,  · Hematologic/Lymphatic: No abnormal bruising  · Allergic/Immunologic: No nasal congestion      Physical Examination:    Vitals:    04/08/20 1528   BP: 132/66   Pulse: 79   Resp: 16   Temp: 98.2 °F (36.8 °C)    Weight: 249 lb 1.9 oz (113 kg)         General Appearance:   In distress with chest discomfort   Head:  Normocephalic, without obvious abnormality, atraumatic   Eyes:  PERRL   Nose: Nares normal,   Neck: Supple, JVP  elevated   Lungs:   Clear to auscultation bilaterally, respirations unlabored   Chest Wall:  No tenderness or deformity   Heart:  Iregular rate and rhythm, normal S1, S2 normal, II/VI

## 2020-04-08 NOTE — CONSULTS
Nephrology Consult Note        Reason for consult:  ESRD      History of present illness:      Patient is a 61 y.o.  female admitted with Acute ST elevation myocardial infarction (STEMI) involving left circumflex coronary artery (HCC) [I21.21]  Acute MI, inferolateral wall, initial episode of care ([facility] [I21.19] who is seen in consult for ESRD    The patient has a significant past medical history of ESRD on dialysis MWF, DM 2, HTN, thyroid disease, obesity, past SBO, diabetic foot ulcer, and septic arthritis left knee. There were plans for continued abx for knee Cutibacterium acnes infection with oral  mg BID for 6 months after course of vancomycin. The patient also has been treated for a left diabetic heel infection with local care. She had dialysis earlier today but after dialysis experienced chest pain. Evaluation in ED showed ST elevation in lateral leads. Taken emergently to cath lab where she experienced respiratory distress requiring intubation. I reviewed with Dr Felicia Sam: patient with occlusion of circumflex lesion that could not be crossed. Has three vessel disease. Would have plans for CABG. On heparin    Patient had high LVEDP of 35 at end of cath. CXR with pulmonary edema.     Patient intubated on vent - unable to give information    Past Medical History:   Diagnosis Date    Arthritis     Diabetes mellitus ([facility]     ESRD (end stage renal disease) ([facility]     Hemodialysis patient ([facility]     Hyperlipidemia     Hypertension     MRSA nasal colonization 07/17/2019    Thyroid disease       Past Surgical History:   Procedure Laterality Date    FOOT SURGERY Left 08/15/2017    LEFT SECOND TOE INCISION AND DEBRIDEMENT OF TISSUE AND BONE WITH OPEN ARTHROPLASTY    INCISION AND DRAINAGE Left 7/19/2019    INCISION AND DRAINAGE OF DEEP ABSCESS OF LEFT LEG AND LEFT SEPTIC  KNEE ARTHROTOMY performed by Sonu Castro MD at [address] [place] [address] Left 11/24/2019    LEFT KNEE INCISION AND DRAINAGE performed by Sandrine Gandhi MD at [facility] Left 11/2019    rremoved infection from left knee    LEG SURGERY Left 11/2019    evacuation of hematoma in left calf     OTHER SURGICAL HISTORY Left 08/17/2017    INCISION AND DRAINAGE, 2ND DIGIT AMPUTATION LEFT FOOT        Admission Meds  No current facility-administered medications on file prior to encounter. Current Outpatient Medications on File Prior to Encounter   Medication Sig Dispense Refill    ondansetron (ZOFRAN) 4 MG tablet       spironolactone (ALDACTONE) 25 MG tablet TAKE 1 TABLET BY MOUTH TWICE DAILY      bumetanide (BUMEX) 2 MG tablet       penicillin v potassium (VEETID) 500 MG tablet Take 1 tablet by mouth 2 times daily x6 months 180 tablet 3    bisacodyl (DULCOLAX) 10 MG suppository Place 10 mg rectally daily as needed for Constipation      oxyCODONE 5 MG capsule Take 10 mg by mouth every 4 hours as needed for Pain.       polyethylene glycol (GLYCOLAX) powder Take 17 g by mouth 2 times daily as needed      Amino Acids-Protein Hydrolys (PRO-STAT) LIQD Take 30 each by mouth 2 times daily 30 cc bid      sennosides-docusate sodium (SENOKOT-S) 8.6-50 MG tablet Take 2 tablets by mouth 2 times daily 60 tablet 3    famotidine (PEPCID) 20 MG tablet Take 1 tablet by mouth daily 60 tablet 3    aspirin-acetaminophen-caffeine (EXCEDRIN MIGRAINE) 250-250-65 MG per tablet Take 1 tablet by mouth every 6 hours as needed for Headaches      acetaminophen (TYLENOL) 325 MG tablet Take 650 mg by mouth every 6 hours as needed for Pain      cyclobenzaprine (FLEXERIL) 10 MG tablet Take 10 mg by mouth nightly      levothyroxine (SYNTHROID) 50 MCG tablet Take 50 mcg by mouth Daily      insulin glargine (LANTUS) 100 UNIT/ML injection vial Inject 20 Units into the skin 2 times daily       insulin lispro (HUMALOG) 100 UNIT/ML injection vial Inject 5 Units into the skin 3 times daily (before meals) If BG > or = 150      B

## 2020-04-08 NOTE — ED NOTES
Pt to cath lab via stretcher with nurse escort on [employer] pads     [employer], RN  04/08/20 3098

## 2020-04-08 NOTE — ED PROVIDER NOTES
[address] [facility] ENCOUNTER          ATTENDING PHYSICIAN NOTE       Date of evaluation: 4/8/2020    Chief Complaint     Chest Pain (x2 hours, L-sided, does not radiate, vitals stable )    History of Present Illness     Seth Bui is a 61 y.o. female who presents to the emergency department from home due to concern for chest pain. She has a past medical history noted below significant for renal disease for which she goes to dialysis, did complete her dialysis appointment this morning without any issues. She had chest pain after arriving home from dialysis which she describes as left-sided, sharp, achy, and heavy pressure. She states since arriving in the emergency department it actually feels a little bit better than before though she still has pain which she rates at 10 out of 10. She denies any shortness of breath though in the room when laid flat to move her up the bed she yells out \"I cannot breathe! \"  She denies any radiation. She has no known history of any cardiac disease. She states she thinks she takes aspirin every day and believes her  might have given her aspirin after coming home from dialysis today. Aside from what is stated above denies any other symptoms or modifying factors. Review of Systems     Review of Systems   Unable to perform ROS: Acuity of condition     Past Medical, Surgical, Family, and Social History     She has a past medical history of Arthritis, Diabetes mellitus ([facility] ESRD (end stage renal disease) ([facility] Hemodialysis patient ([facility] Hyperlipidemia, Hypertension, MRSA nasal colonization, and Thyroid disease. She has a past surgical history that includes Foot surgery (Left, 08/15/2017); other surgical history (Left, 08/17/2017); incision and drainage (Left, 7/19/2019); incision and drainage (Left, 11/24/2019); knee surgery (Left, 11/2019); and Leg Surgery (Left, 11/2019). Her family history is not on file.   She reports that she quit smoking about 3 years ago. Her smoking use included cigarettes. She has a 5.00 pack-year smoking history. She has never used smokeless tobacco. She reports that she does not drink alcohol or use drugs. Medications     Previous Medications    ACETAMINOPHEN (TYLENOL) 325 MG TABLET    Take 650 mg by mouth every 6 hours as needed for Pain    AMINO ACIDS-PROTEIN HYDROLYS (PRO-STAT) LIQD    Take 30 each by mouth 2 times daily 30 cc bid    AMLODIPINE (NORVASC) 10 MG TABLET    Take 10 mg by mouth daily Hold for SBP less than 100    ASPIRIN 81 MG TABLET    Take 81 mg by mouth daily    ASPIRIN-ACETAMINOPHEN-CAFFEINE (EXCEDRIN MIGRAINE) 250-250-65 MG PER TABLET    Take 1 tablet by mouth every 6 hours as needed for Headaches    B COMPLEX-C-FOLIC ACID (LILIA CAPS) 1 MG CAPS    Take 1 capsule by mouth daily     BISACODYL (DULCOLAX) 10 MG SUPPOSITORY    Place 10 mg rectally daily as needed for Constipation    BUMETANIDE (BUMEX) 2 MG TABLET        CYCLOBENZAPRINE (FLEXERIL) 10 MG TABLET    Take 10 mg by mouth nightly    FAMOTIDINE (PEPCID) 20 MG TABLET    Take 1 tablet by mouth daily    INSULIN GLARGINE (LANTUS) 100 UNIT/ML INJECTION VIAL    Inject 20 Units into the skin 2 times daily     INSULIN LISPRO (HUMALOG) 100 UNIT/ML INJECTION VIAL    Inject 5 Units into the skin 3 times daily (before meals) If BG > or = 150    LEVOTHYROXINE (SYNTHROID) 50 MCG TABLET    Take 50 mcg by mouth Daily    NORETHINDRONE (AYGESTIN) 5 MG TABLET    Take 5 mg by mouth 2 times daily     NYSTATIN (MYCOSTATIN) 976245 UNIT/GM OINTMENT    Apply topically 3 times daily Apply topically 2 times daily. ONDANSETRON (ZOFRAN) 4 MG TABLET        OXYCODONE 5 MG CAPSULE    Take 10 mg by mouth every 4 hours as needed for Pain.     PENICILLIN V POTASSIUM (VEETID) 500 MG TABLET    Take 1 tablet by mouth 2 times daily x6 months    POLYETHYLENE GLYCOL (GLYCOLAX) POWDER    Take 17 g by mouth 2 times daily as needed    PRAVASTATIN (PRAVACHOL) 20 MG TABLET    Take 20 mg Behavior normal.       Diagnostic Results     EKG   -Rate 80, rhythm sinus, axis left. Concern for ST elevation noted in the lateral leads I and aVL with reciprocal changes in the inferior leads as well as anterior lateral leads. Comparison to previous EKG from November 26, 2018 shows these changes are new. RADIOLOGY:  XR CHEST PORTABLE    (Results Pending)     LABS:   Results for orders placed or performed during the hospital encounter of 04/08/20   CBC Auto Differential   Result Value Ref Range    WBC 7.4 4.0 - 11.0 K/uL    RBC 3.30 (L) 4.00 - 5.20 M/uL    Hemoglobin 10.9 (L) 12.0 - 16.0 g/dL    Hematocrit 31.7 (L) 36.0 - 48.0 %    MCV 96.0 80.0 - 100.0 fL    MCH 33.0 26.0 - 34.0 pg    MCHC 34.3 31.0 - 36.0 g/dL    RDW 15.0 12.4 - 15.4 %    Platelets 845 187 - 220 K/uL    MPV 7.4 5.0 - 10.5 fL    Neutrophils % 69.9 %    Lymphocytes % 11.8 %    Monocytes % 12.9 %    Eosinophils % 2.6 %    Basophils % 2.8 %    Neutrophils Absolute 5.1 1.7 - 7.7 K/uL    Lymphocytes Absolute 0.9 (L) 1.0 - 5.1 K/uL    Monocytes Absolute 0.9 0.0 - 1.3 K/uL    Eosinophils Absolute 0.2 0.0 - 0.6 K/uL    Basophils Absolute 0.2 0.0 - 0.2 K/uL   EKG 12 Lead   Result Value Ref Range    Ventricular Rate 80 BPM    Atrial Rate 80 BPM    P-R Interval 152 ms    QRS Duration 94 ms    Q-T Interval 396 ms    QTc Calculation (Bazett) 456 ms    P Axis -10 degrees    R Axis -33 degrees    T Axis -17 degrees    Diagnosis       EKG performed in ER and to be interpreted by ER physician. Confirmed by MD, ER ([id number]),  Kristina Loja ([id number]) on 4/8/2020 3:40:16 PM     RECENT VITALS:  BP: 132/66,Temp: 98.2 °F (36.8 °C), Pulse: 79, Resp: 16,       Procedures     - n/a    ED Course     Nursing Notes, Past Medical Hx, Past Surgical Hx, Social Hx,Allergies, and Family Hx were reviewed.     patient was given the following medications:  Orders Placed This Encounter   Medications    aspirin chewable tablet 324 mg       CONSULTS:  None    MEDICAL DECISIONMAKING

## 2020-04-09 PROBLEM — I50.1 PULMONARY EDEMA CARDIAC CAUSE (HCC): Status: ACTIVE | Noted: 2020-01-01

## 2020-04-09 PROBLEM — J96.21 ACUTE ON CHRONIC RESPIRATORY FAILURE WITH HYPOXIA AND HYPERCAPNIA (HCC): Status: ACTIVE | Noted: 2020-01-01

## 2020-04-09 PROBLEM — J96.22 ACUTE ON CHRONIC RESPIRATORY FAILURE WITH HYPOXIA AND HYPERCAPNIA (HCC): Status: ACTIVE | Noted: 2020-01-01

## 2020-04-09 NOTE — CARE COORDINATION
Prescription(s) must be in pharmacy by 3pm to be filled same day  3. Copy of patient's insurance/ prescription drug card and patient face sheet must be sent along with the prescription(s)  4. Cost of Rx cannot be added to hospital bill. If financial assistance is needed, please contact unit  or ;  or  CANNOT provide pharmacy voucher for patients co-pays  5. Patients can then  the prescription on their way out of the hospital at discharge, or pharmacy can deliver to the bedside if staff is available. (payment due at time of pick-up or delivery - cash, check, or card accepted)     Able to afford home medications/ co-pay costs: Yes      PT AM-PAC:   /24  OT AM-PAC:   /24    [place]: home w/ spouse  One level w/ 3 steps and 1 to enter  Plans to RETURN to current housing: Yes  Barriers to RETURNING to current housing: medical complications    Drew Gallo 78  Currently ACTIVE with [address]: Yes  [address]: [address] [facility] [address]  Phone: [phone] Fax: [phone]    Currently ACTIVE with [employer]: No        Dialysis  Active with HD/PD prior to admission: Yes      HD Center:  [facility]  Address: [address] [place] [address]  Phone: [phone]    DISCHARGE PLAN:  Disposition: TBD    Transportation PLAN for discharge: TBD     Factors facilitating achievement of predicted outcomes: Family support/home care    Barriers to discharge: Medical complications    Additional Case Management Notes: Ms. Eddy Sheth is intubated and sedated but responsive pressure somewhat soft with systolic blood pressure in the 90s and heart rate 80s.   Medical plan: She is being prepared for CVVHD/Cardio sugg CABG when stable     : : [phone]    The Plan for Transition of Care is related to the following treatment goals of Acute ST elevation myocardial infarction (STEMI) involving left circumflex [facility] artery [facility] [I21.21]  Acute MI, inferolateral wall, initial episode of care [facility]) [I21.19]    The Patient and/or patient representative Bethanie Garcia and her family were provided with a choice of provider and agrees with the discharge plan Yes    Freedom of choice list was provided with basic dialogue that supports the patient's individualized plan of care/goals and shares the quality data associated with the providers.  Yes      Care Transition patient: No    Jacquie Benavides RN  [facility] [employer]  Case Management Department  Ph: [phone]

## 2020-04-09 NOTE — CONSULTS
Rectal Daily PRN Bola Magallon MD        sodium chloride flush 0.9 % injection 10 mL  10 mL Intravenous 2 times per day Bola Magallon MD        sodium chloride flush 0.9 % injection 10 mL  10 mL Intravenous PRN Bola Magallon MD        acetaminophen (TYLENOL) tablet 650 mg  650 mg Oral Q6H PRN Bola Magallon MD        Or   Romi Koch acetaminophen (TYLENOL) suppository 650 mg  650 mg Rectal Q6H PRN Bola Magallon MD        polyethylene glycol (GLYCOLAX) packet 17 g  17 g Oral Daily PRN Bola Magallon MD        promethazine (PHENERGAN) tablet 12.5 mg  12.5 mg Oral Q6H PRN Bola Magallon MD        Or    [facility] 4 mg  4 mg Intravenous Q6H PRN Bola Magallon MD        heparin (porcine) injection 4,000 Units  4,000 Units Intravenous PRN Bola Magallon MD        heparin (porcine) injection 2,000 Units  2,000 Units Intravenous PRN Bola Magallon MD   2,000 Units at 04/09/20 0719    heparin 25,000 units in dextrose 5% 250 mL infusion  11 Units/kg/hr Intravenous Continuous Cornelio Moody MD 12.4 mL/hr at 04/09/20 0721 11 Units/kg/hr at 04/09/20 0721    tirofiban (AGGRASTAT) 50 mcg/mL infusion  0.075 mcg/kg/min Intravenous Continuous Bola Magallon MD 10.2 mL/hr at 04/08/20 1933 0.075 mcg/kg/min at 04/08/20 1933    propofol injection  10 mcg/kg/min Intravenous Titrated Bola Magallon MD 20.3 mL/hr at 04/09/20 0536 30 mcg/kg/min at 04/09/20 0536    glucose (GLUTOSE) 40 % oral gel 15 g  15 g Oral PRN Bola Magallon MD        dextrose 50 % IV solution  12.5 g Intravenous PRN Bola Magallon MD        glucagon (rDNA) injection 1 mg  1 mg Intramuscular PRN Bola Magallon MD        dextrose 5 % solution  100 mL/hr Intravenous PRN Bola Magallon MD        HYDROmorphone HCl PF (DILAUDID) 20 mg in sodium chloride 0.9 % 100 mL infusion  0.5 mg/hr Intravenous Continuous Arash Humphries DO 2.5 mL/hr at 04/09/20 0210 0.5 mg/hr at 04/09/20 0210       Allergies:  Patient has no known allergies.     Social History:    Unable to ask; patient on ventilator    Family History:    Unable to ask - patient on ventilator. REVIEW OF SYSTEMS:    Patient on ventilator, unable to ask    PHYSICAL EXAM:    VITALS:  BP (!) 87/54   Pulse 78   Temp 97.6 °F (36.4 °C) (Axillary)   Resp 21   Wt 249 lb 1.9 oz (113 kg)   SpO2 97%   BMI 39.02 kg/m²   Physical Exam  Vitals signs reviewed. Constitutional:       General: She is not in acute distress. Appearance: She is overweight. Interventions: She is sedated, intubated and restrained. HENT:      Head: Normocephalic and atraumatic. Eyes:      General: Lids are normal.      Conjunctiva/sclera: Conjunctivae normal.   Cardiovascular:      Rate and Rhythm: Normal rate and regular rhythm. Pulmonary:      Effort: Pulmonary effort is normal. No respiratory distress. She is intubated. Abdominal:      General: There is no distension. Palpations: Abdomen is soft. Tenderness: There is no abdominal tenderness. Musculoskeletal:      Right lower leg: Edema present. Left lower leg: Edema present. Skin:     General: Skin is warm.       L heel, full thickness, nonhealing ulcer with 2nd toe amputation  Neurological:      Comments: spontaneously moves extremities   Psychiatric:      Comments: Unable to assess: patient intubated   DATA:    CBC:   Lab Results   Component Value Date    WBC 12.8 04/09/2020    RBC 3.15 04/09/2020    HGB 10.2 04/09/2020    HCT 30.5 04/09/2020    MCV 97.1 04/09/2020    MCH 32.5 04/09/2020    MCHC 33.5 04/09/2020    RDW 15.4 04/09/2020     04/09/2020    MPV 7.8 04/09/2020     CMP:    Lab Results   Component Value Date     04/09/2020    K 5.8 04/09/2020    K 4.4 04/08/2020    CL 90 04/09/2020    CO2 23 04/09/2020    BUN 30 04/09/2020    CREATININE 6.1 04/09/2020    GFRAA 8 04/09/2020    GFRAA 26 08/02/2011    AGRATIO 1.3 08/14/2017    LABGLOM 7 04/09/2020    GLUCOSE 175 04/09/2020    PROT 6.3 12/15/2019    LABALBU 3.2 04/09/2020    CALCIUM 8.5 04/09/2020    BILITOT

## 2020-04-10 NOTE — CARE COORDINATION
Case Management Assessment           Daily Note                 Date/ Time of Note: 4/10/2020 2:22 PM         Note completed by: Carlos Izaguirre    Patient Name: Nyasia Santos  YOB: 1960    Diagnosis:Acute ST elevation myocardial infarction (STEMI) involving left circumflex coronary artery (HCC) [I21.21]  Acute MI, inferolateral wall, initial episode of care [facility]) [I21.19]  Patient Admission Status: Inpatient    Date of Rowdy Strange  3:18 PM Length of Stay: 2 GLOS:        Current Plan of Care:   ________________________________________________________________________________________  PT AM-PAC:   / 24 per last evaluation on:    OT AM-PAC:   / 24 per last evaluation on:    DME Needs for discharge: TBD    ________________________________________________________________________________________  Discharge Plan: To Be Determined DUE TO: medical complications at this time/ vent-plan to do CABG next week, then will continue to follow for d/c planning    Tentative discharge date: TBD    Current barriers to discharge: medical complications    ________________________________________________________________________________________  Case Management Notes: Ms. Paz is intubated and sedated but responsive pressure somewhat soft with systolic blood pressure in the 90s and heart rate 80s. Medical plan: She is being prepared for CVVHD/Cardio sugg CABG when stable. Plan to have CABG next week is tentative. SBT today 4/10). Patient was able to tolerate SBT for 60 minutes. Nursing updated spouse on condition this morning.  : : Sri Mcintyre and her family were provided with choice of provider; she and her family are in agreement with the discharge plan.     Care Transition Patient: Mona Izaguirre RN  [facility]  Case Management Department  Ph: [phone]

## 2020-04-11 NOTE — CODE DOCUMENTATION
Pt was noted to be bradycardic around 0405 at which time a Code Blue was called. Pt was noted to be in PEA on monitor and compressions were started. Pt given several rounds of epi. Achieved ROSC but went quickly into unstable Vtach. Unsynchronized cardioversion was attempted but pt lost pulse and was found to be in VFib. Received several rounds of shocks and several additional milligrams of epi w/o ROSC. Time of death 46. Family notified.

## 2020-04-11 NOTE — CODE DOCUMENTATION
Shocked at [address] [facility], patient went into vfib, shocked at [facility], resume CPR

## 2020-04-11 NOTE — PROGRESS NOTES
CRRT alarming access pressures extremely negative. Blood drawn from lines without issues, lines flushed without issues, lines swapped and repositioned and CRRT still alarming. Multiple RNs and Medical Residents at bedside. CRRT continuously alarming. Access appears to be kinking off at patient. CRRT stopped and blood returned to patient without complications. Consult for general surgery to replace vas cath placed. Will continue to monitor.
Clinical Pharmacy Progress Note  Medication History     Admit Date: 4/8/2020    List of of current medications patient is taking is in progress. Home Medication list in EPIC updated to reflect changes noted below. [facility]patient intubated and unable to participate)     Patient's home pharmacy: [facility]     Changes made to medication list:   Medications removed: (include reason, ex: therapy completed, inactive medication)   Bisacodyl suppository    Cyclobenzaprine: last filled for 30 days supply in November 2019     Senna-docusate   Polyethylene glycol    Excedrin migraine    Oxycodone   Medications added:    Escitalopram   Medication doses adjusted:    Renvela: patient takes 4 tablets TID with meals and 2 tablets with snacks.     Insulin glargine (Lantus): instructions say to take 40 units nightly   Other notes:    Unsure if patient takes the following:   o Amlodipine: last filled for 30 days supply on 2/9/2020  o Insulin lispro (Humalog)  o Aspirin 81 mg  o Famotidine  o Levothyroxine 50 mcg: last filled for 90 days supply in October 2019    Patient is to take Penicillin V (Veetid) 500 mg BID for 6 months     Kristel Arboleda, PharmD  PGY1 Pharmacy Resident   Wireless: [phone]  4/9/2020 11:31 AM
Code blue in cath lab. Patient intubated with 7.5 ETT utilizing the Glidescope and placed on I vent AC 26/ 450/100%/ +8 PEEP. ETT 25 at the teeth. SPO2 97% 5 minutes post intubation. ETCO2 45.
Department of Cardiovascular & Thoracic Surgery  Consult Progress Note          DIAGNOSIS: STEMI    SUBJECTIVE:  CRRT started yesterday afternoon. On extubation appropriate vent settings. Was still 400ml positive yesterday        PHYSICAL EXAM:    VITALS:  BP 93/80   Pulse 86   Temp 98.8 °F (37.1 °C) (Rectal)   Resp 20   Wt 260 lb 9.3 oz (118.2 kg)   SpO2 95%   BMI 40.81 kg/m²   Physical Exam  Vitals signs reviewed. Constitutional:       General: She is not in acute distress. Appearance: She is overweight. Interventions: She is sedated, intubated and restrained. HENT:      Head: Normocephalic and atraumatic. Eyes:      General: Lids are normal.      Conjunctiva/sclera: Conjunctivae normal.   Cardiovascular:      Rate and Rhythm: Normal rate and regular rhythm. Pulmonary:      Effort: Pulmonary effort is normal. No respiratory distress. She is intubated. Abdominal:      General: There is no distension. Palpations: Abdomen is soft. Tenderness: There is no abdominal tenderness. Musculoskeletal:      Right lower leg: Edema present. Left lower leg: Edema present. Skin:     General: Skin is warm.       L heel, full thickness, nonhealing ulcer with 2nd toe amputation  Neurological:      Comments: spontaneously moves extremities   Psychiatric:      Comments: Unable to assess: patient intubated   DATA:    CBC:   Lab Results   Component Value Date    WBC 18.0 04/10/2020    RBC 2.87 04/10/2020    HGB 9.2 04/10/2020    HCT 28.3 04/10/2020    MCV 98.5 04/10/2020    MCH 32.0 04/10/2020    MCHC 32.5 04/10/2020    RDW 15.3 04/10/2020     04/10/2020    MPV 7.9 04/10/2020     CMP:    Lab Results   Component Value Date     04/10/2020    K 5.1 04/10/2020    K 4.4 04/08/2020    CL 96 04/10/2020    CO2 25 04/10/2020    BUN 24 04/10/2020    CREATININE 4.0 04/10/2020    GFRAA 14 04/10/2020    GFRAA 26 08/02/2011    AGRATIO 1.3 08/14/2017    LABGLOM 11 04/10/2020    GLUCOSE 190
Excell Starch in to see pt, will dc femoral sheath, orders to hold heparin for 2 hrs and then check ACT, if ACT less than 170 may remove sheath. Will continue to monitor.
It was noticed by this RN that a small amount of blood was pulsating in one of the CVC lines. I had stopped the nimbex gtt and unhooked it. When I went to start the ampicillin, I noticed the pulsation. An ABG was drawn off the central line and the PO2 was 105. The blood was very bright red. Residents were informed. Medications running were combined and moved to other accesses. Medications that were running in the arterial CVC were nimbex, propofol, dilaudid gtt, aggrastat, heparin. It was noted that shortly after hooking her sedation up to her PIV, she was resting better and blood pressure was dropping. She required about half of the sedation from before. CVC was pulled without complications. There is a small hematoma that is present distal to the sheaths and old CVC. This has been present. This was mashed out, but a small amount remains at this time.
MECHANICAL VENTILATION WEANING PROTOCOL    PRE-TRIAL PATIENT ASSESSMENT - COMPLETED AT 1130    Ventilatory Assessment:    PARAMETER CRITERIA FOR WEANING   Spontaneous Cough:  Yes    Sputum Characteristics:  · Sputum Amount: None  · Tenacity: Thick  · Sputum Color: Dark red SPONTANEOUS COUGH With small to moderate  Amount of secretions   FiO2 : 40 % FIO2 less than or equal to 50%     PEEP less than or equal to 8   Progressive Mobility Protocol  Yes     ABG:  Lab Results   Component Value Date    PHART 7.397 04/10/2020    ENT7VDC 47.6 04/10/2020    PO2ART 100.2 04/10/2020    P7NQVRLR 98 04/10/2020    KJC3NAZ 29.3 04/10/2020    BEART 4 04/10/2020     HGB/WBC:  Lab Results   Component Value Date    HGB 9.2 04/10/2020    WBC 18.0 04/10/2020        Vital Signs:    PARAMETER CRITERIA FOR WEANING Meets Criteria   Pulse: 87 Within patient's normal limits / stable Yes   Resp: 19 Less than or equal to 30 Yes   BP: (!) 122/52 Within patient's normal limits / minimal pressors (Hemodynamically Stable) Physician/RN aware   SpO2: 99 % Greater than or equal to 90% Yes   End Tidal CO2: 36 (%) Within patient's normal limits Yes   Temp: 99.3 °F (37.4 °C) Less than 38. 5oC / 101. 3oF Yes     [x]    Based on this assessment and the [facility] Ventilator Weaning Protocol, this patient  IS being placed on a Spontaneous Breathing Trial (SBT) at this time.  []    Based on this assessment and the [facility] Ventilator Weaning Protocol, this patient  IS NOT being placed on a Spontaneous Breathing Trial (SBT) at this time. []    Patient  IS NOT being placed on a Spontaneous Breathing Trial (SBT) at this time because of factors not previously addressed.   Those factors include        SBT - Initiated at  1135    Ventilator Settings:  CPAP - 5 cmH2O, PS - 10 cmH2O(if using settings other than CPAP 5/PS 8, please explain reasons for settings here):  OH protocol     1 Minute SBT Respiratory Parameters:   VE: 5.5 L   RR: 12 b/m   VT: 325 mL (average VT =
MECHANICAL VENTILATION WEANING PROTOCOL    PRE-TRIAL PATIENT ASSESSMENT - COMPLETED AT 1135    Ventilatory Assessment:    PARAMETER CRITERIA FOR WEANING   Spontaneous Cough:  Yes    Sputum Characteristics:  · Sputum Amount: None  · Tenacity: Thick  · Sputum Color: Dark red SPONTANEOUS COUGH With small to moderate  Amount of secretions   FiO2 : 40 % FIO2 less than or equal to 50%     PEEP less than or equal to 8   Progressive Mobility Protocol  No     ABG:  Lab Results   Component Value Date    PHART 7.397 04/10/2020    AOD0XVS 44.9 04/10/2020    PO2ART 75.2 04/10/2020    C9LZAVHW 95 04/10/2020    UTP4BLY 27.6 04/10/2020    BEART 3 04/10/2020     HGB/WBC:  Lab Results   Component Value Date    HGB 9.2 04/10/2020    WBC 18.0 04/10/2020        Vital Signs:    PARAMETER CRITERIA FOR WEANING Meets Criteria   Pulse: 91 Within patient's normal limits / stable Yes   Resp: 20 Less than or equal to 30 Yes   BP: 93/80 Within patient's normal limits / minimal pressors (Hemodynamically Stable) Yes   SpO2: 92 % Greater than or equal to 90% Yes   End Tidal CO2: 36 (%) Within patient's normal limits Yes   Temp: 98.8 °F (37.1 °C) Less than 38. 5oC / 101. 3oF Yes     []    Based on this assessment and the [facility] Ventilator Weaning Protocol, this patient  IS being placed on a Spontaneous Breathing Trial (SBT) at this time.  []    Based on this assessment and the [facility] Ventilator Weaning Protocol, this patient  IS NOT being placed on a Spontaneous Breathing Trial (SBT) at this time. []    Patient  IS NOT being placed on a Spontaneous Breathing Trial (SBT) at this time because of factors not previously addressed.   Those factors include      Vital Capacity (VC) = 1050 ml    Maximum Inspiratory Force (MIF) = -30 cmH2O    SBT - Initiated at  1140    Ventilator Settings:  CPAP - 5 cmH2O, PS - 10 cmH2O(if using settings other than CPAP 5/PS 8, please explain reasons for settings here):       1 Minute SBT Respiratory Parameters:   VE: 8.5 L   RR: 9
NUTRITION ASSESSMENT  Admission Date: 4/8/2020     Type and Reason for Visit: Initial    NUTRITION RECOMMENDATIONS:   **NPO- monitor ability to extuabte with diet advancement vs need for alternative nutrition. Recommendations provided below as needed. ENTERAL NUTRITION  1. Recommend order \"Diet: Tube feed continuous/ NPO\". Initiate Glucerna 1.2 formula at 20 mL/hr and as tolerated, increase by 20 mL/hr q 4 hours until goal of 65 mL/hr  is met. 2. Suggest 100 mL H20 q 4 hours for 100% fluid recommendations when no IVF. 3. Monitor for tolerance (bowel habits, N/V, cramping, abdominal distention). NUTRITION ASSESSMENT:  Patient with high nutrition risk as she is currently intubated and sedated r/t respiratory failure. Pt is currently requiring CRRT and levo as well as propofol for sedation. Will provide EN recs and monitor nutritional status. MALNUTRITION ASSESSMENT  Context: Acute illness or injury   Malnutrition Status:  At risk for malnutrition  Findings of the 6 clinical characteristics of malnutrition (Minimum of 2 out of 6 clinical characteristics is required to make the diagnosis of moderate or severe Protein Calorie Malnutrition based on AND/ASPEN Guidelines):   Energy Intake %: Less than or equal to 50% of estimated energy requirement   Energy Intake Time: (x2 days)   Interpretation of Weight Loss %: No significant weight loss   Interpretation of Weight Loss Time: in 3 months   Body Fat Status: Unable to assess   Muscle Mass Status: Unable to assess   Fluid Accumulation Status: No significant fluid accumulation   Reduced  Strength: Not measured    NUTRITION DIAGNOSIS   Problem: Inadequate Oral Intake  Etiology: Acute or chronic injury or trauma  Signs & Symptoms: Intubation  and NPO status due to medical condition    NUTRITION INTERVENTION  Food and/or Nutrient Delivery:Continue NPO  Nutrition education/counseling/coordination of care: Continue Inpatient Monitoring     NUTRITION
Patient continuously coughing up bright red blood with large clots. Patient is also oozing blood from CVC site and Vas cath site. Page placed to Dr. Estephania Tabor on call for cardiology. Ordered to stop heparin and restart at 10 PM and reassess patient status. Medical Residents aware. Will continue to monitor.
Patient has been successfully weaned from Mechanical Ventilation. RSBI before extubation was 21 with EtCO2 of 30 and SpO2 of 96 on 40% FiO2. Patient extubated and placed on 5 liters/min via nasal cannula. Post extubation SpO2 is 99% with HR  82 bpm and RR 18 breaths/min. Patient had strong cough that was productive of bloody sputum. Extubation Well tolerated by patient. Jann Mckinney
Patient went bradycardic and staff assist called. Multiple RNs and Medical Residents to bedside. Patient found to be unresponsive. No pulse found. CPR started at 0405. See code narrator. Code ended 0673. Time of death 0422 per Dr. Sharyn Clement.
(porcine), glucose, dextrose, glucagon (rDNA), dextrose    Lab Data:  CBC:   Recent Labs     04/08/20  1534 04/09/20  0415   WBC 7.4 12.8*   HGB 10.9* 10.2*   HCT 31.7* 30.5*   MCV 96.0 97.1    381     BMP:   Recent Labs     04/08/20  1534 04/08/20  1708 04/09/20  0415 04/09/20  1050     --  134*  --    K 4.4  --  5.8*  --    CL 93*  --  90*  --    CO2 30  --  23  --    PHOS  --   --  6.3* 7.2*   BUN 29*  --  30*  --    CREATININE 5.5* 5.8* 6.1*  --      LIVER PROFILE: No results for input(s): AST, ALT, LIPASE, BILIDIR, BILITOT, ALKPHOS in the last 72 hours. Invalid input(s):   AMYLASE,  ALB  PT/INR:   Recent Labs     04/08/20  1534 04/09/20  0415   PROTIME 12.5 12.0   INR 1.08 1.03         Assessment:  Patient Active Problem List    Diagnosis Date Noted    Acute ST elevation myocardial infarction (STEMI) involving left circumflex coronary artery ([address] 04/08/2020    Acute MI, inferolateral wall, initial episode of care ([address] 04/08/2020    Infection due to Cutibacterium species 12/16/2019    Ventral hernia with bowel obstruction     Small bowel obstruction ([address] 12/15/2019    Status post incision and drainage     Arthritis, septic, knee ([address] 11/24/2019    ESRD on hemodialysis ([address]     Diabetic nephropathy associated with type 2 diabetes mellitus ([address]     History of methicillin resistant staphylococcus aureus (MRSA)     Claustrophobia     Venous (peripheral) insufficiency 11/12/2019    Lymphedema 11/12/2019    Morbid obesity due to excess calories ([address] 11/12/2019    Pain and swelling of left lower extremity     Leg edema, left 10/02/2019    Receiving intravenous antibiotic treatment as outpatient     Diabetes education, encounter for     Abscess of left leg     Mass of left lower leg     Pyogenic arthritis of left knee joint ([address]     Diabetic [facility] with type [facility]     Hyperkalemia 07/16/2019    Hematoma of lower extremity, left,
promethazine **OR** ondansetron, heparin (porcine), heparin (porcine), glucose, dextrose, glucagon (rDNA), dextrose    Lab Data:  CBC:   Recent Labs     04/08/20  1534 04/09/20  0415 04/10/20  0410   WBC 7.4 12.8* 18.0*   HGB 10.9* 10.2* 9.2*   HCT 31.7* 30.5* 28.3*   MCV 96.0 97.1 98.5    381 390     BMP:   Recent Labs     04/09/20  1817 04/10/20  0410 04/10/20  0930   * 136  137 135*   K 5.1 4.9  4.9 5.1   CL 92* 96*  97* 96*   CO2 22 25  25 25   PHOS 6.9* 5.6* 5.1*   BUN 34* 27*  27* 24*   CREATININE 6.0* 4.5*  4.5* 4.0*     LIVER PROFILE: No results for input(s): AST, ALT, LIPASE, BILIDIR, BILITOT, ALKPHOS in the last 72 hours. Invalid input(s):   AMYLASE,  ALB  PT/INR:   Recent Labs     04/08/20  1534 04/09/20 0415   PROTIME 12.5 12.0   INR 1.08 1.03         Assessment:  Patient Active Problem List    Diagnosis Date Noted    Pulmonary edema cardiac cause ([facility] 04/09/2020    Acute on chronic respiratory failure with hypoxia and hypercapnia (HCC) 04/09/2020    Acute ST elevation myocardial infarction (STEMI) involving left circumflex coronary artery ([facility] 04/08/2020    Acute MI, inferolateral wall, initial episode of care ([facility] 04/08/2020    Infection due to Cutibacterium species 12/16/2019    Ventral hernia with bowel obstruction     Small bowel obstruction ([address] 12/15/2019    Status post incision and drainage     Arthritis, septic, knee ([address] 11/24/2019    ESRD on hemodialysis ([address]     Diabetic nephropathy associated with type 2 diabetes mellitus ([address]     History of methicillin resistant staphylococcus aureus (MRSA)     Claustrophobia     Venous (peripheral) insufficiency 11/12/2019    Lymphedema 11/12/2019    Morbid obesity due to excess calories ([address] 11/12/2019    Pain and swelling of left lower extremity     Leg edema, left 10/02/2019    Receiving intravenous antibiotic treatment as outpatient     Diabetes education, encounter for     Abscess of left leg    
tirofiban 0.075 mcg/kg/min (04/09/20 5617)    propofol 30 mcg/kg/min (04/10/20 0740)    dextrose      HYDROmorphone 1 mg/hr (04/09/20 4293)     PRN Meds:potassium chloride, magnesium sulfate, calcium gluconate IVPB **OR** calcium gluconate IVPB **OR** calcium gluconate IVPB **OR** calcium gluconate IVPB, sodium phosphate IVPB **OR** sodium phosphate IVPB **OR** sodium phosphate IVPB **OR** sodium phosphate IVPB, bisacodyl, sodium chloride flush, acetaminophen **OR** acetaminophen, polyethylene glycol, promethazine **OR** ondansetron, heparin (porcine), heparin (porcine), glucose, dextrose, glucagon (rDNA), dextrose    Objective:   Vitals:   T-max:  Patient Vitals for the past 8 hrs:   BP Temp Temp src Pulse Resp SpO2 Weight   04/10/20 0930 -- -- -- 83 23 97 % --   04/10/20 0915 -- -- -- 79 21 99 % --   04/10/20 0900 (!) 113/48 -- -- 80 22 97 % 260 lb 9.3 oz (118.2 kg)   04/10/20 0845 -- -- -- 82 24 99 % --   04/10/20 0830 -- 98.6 °F (37 °C) Rectal 78 21 99 % --   04/10/20 0815 -- -- -- 78 22 99 % --   04/10/20 0800 (!) 110/48 -- -- 77 22 97 % --   04/10/20 0745 -- -- -- 76 20 99 % --   04/10/20 0730 -- -- -- 75 16 98 % --   04/10/20 0715 -- -- -- 77 25 99 % --   04/10/20 0700 (!) 106/49 97.5 °F (36.4 °C) Rectal 76 21 99 % 264 lb 12.4 oz (120.1 kg)   04/10/20 0645 -- -- -- 76 18 100 % --   04/10/20 0630 -- -- -- 75 21 100 % --   04/10/20 0615 -- -- -- 74 26 100 % --   04/10/20 0600 (!) 105/57 96.8 °F (36 °C) Rectal 71 20 99 % --   04/10/20 0545 -- -- -- 70 18 99 % --   04/10/20 0530 -- -- -- 71 24 99 % --   04/10/20 0515 -- -- -- 70 16 99 % --   04/10/20 0500 (!) 99/51 96.3 °F (35.7 °C) Rectal 71 18 99 % --   04/10/20 0445 -- -- -- 65 27 99 % --   04/10/20 0430 -- -- -- 68 19 99 % --   04/10/20 0415 -- -- -- 67 21 99 % --   04/10/20 0400 (!) 98/49 95.4 °F (35.2 °C) Rectal 67 22 99 % --   04/10/20 0345 -- -- -- 64 20 99 % --   04/10/20 0330 -- -- -- 64 22 99 % --   04/10/20 0315 -- -- -- 66 25 99 % --   04/10/20

## 2023-07-06 NOTE — LETTER
[facility]  [address] [facility] [address]  Phone: [phone]  Fax: [phone]      May 24, 2019       Patient: Javier Mclaughlin   MR Number: [id number]   YOB: 1960   Date of Visit: 5/24/2019       Dear Dr. Montrell Stevens : Thank you for the request for consultation for Patricia Cannon to me. Below are the relevant portions of my assessment and plan of care. Assessment:       Diagnosis   1. Atherosclerosis of native artery of left lower extremity with ulceration of heel ([address]    2. Type 2 diabetes mellitus with atherosclerosis of native arteries of extremity with intermittent claudication (HCC)    3. PAD (peripheral artery disease) ([address]    4. Critical lower limb ischemia    5. Pain in both lower extremities      Patient presents with evidence of severe PAD and nonhealing ulceration of the left foot. This is consistent with critical limb ischemia and she is at high risk for major amputation without revascularization. I have reviewed the arterial duplex images which confirm severe arterial occlusive disease of the bilateral lower extremities. Plan:      After reviewing the LE arterial duplex scan done 5/1/19 discussion was had with patient about the benefits of angiography with intent to treat, in restoring blood flow to increase likelihood of healing. Patient is to continue her daily 20MG pravastatin for her daily statin therapy. Patient is to start an 81MG Aspirin for her daily antiplatelet therapy. In order to promote wound healing and provide long-term limb salvage, recommend proceeding with diagnostic angiography with possible endovascular revascularization. The Patient will be scheduled for an Out-patient angiogram of the aorta and bilateral lower extremity w/possible revascularization at [facility].    Patient is scheduled for the angiogram on 06/06/2019. Hypertension Documentation:  Hypertension was addressed today.  Relevant social needs were addressed today.    Actions taken today:  Discussed medication adherence or lifestyle factors.   If you have questions, please do not hesitate to call me. I look forward to following [facility] along with you.     Sincerely,        Alexia Ortiz MD    CC providers:  No Recipients

## (undated) DEVICE — COVER LT HNDL BLU PLAS

## (undated) DEVICE — SWAB CULT DBL W/O CHAR RAYON TIP AMIES GEL CLMN FOR COLL

## (undated) DEVICE — COVER,MAYO STAND,XL,STERILE: Brand: MEDLINE

## (undated) DEVICE — SUTURE VCRL SZ 4-0 L18IN ABSRB UD L19MM PS-2 3/8 CIR PRIM J496H

## (undated) DEVICE — INTENDED FOR TISSUE SEPARATION, AND OTHER PROCEDURES THAT REQUIRE A SHARP SURGICAL BLADE TO PUNCTURE OR CUT.: Brand: BARD-PARKER ® CARBON RIB-BACK BLADES

## (undated) DEVICE — NEEDLE SPNL L3.5IN PNK HUB S STL REG WALL FIT STYL W/ QNCKE

## (undated) DEVICE — SIPS DUAL 2 MINUTE TIP

## (undated) DEVICE — TOWEL,OR,DSP,ST,BLUE,DLX,8/PK,10PK/CS: Brand: MEDLINE

## (undated) DEVICE — HIGH FLOW TIP

## (undated) DEVICE — DRESSING,GAUZE,XEROFORM,CURAD,1"X8",ST: Brand: CURAD

## (undated) DEVICE — SURE SET-DOUBLE BASIN-LF: Brand: MEDLINE INDUSTRIES, INC.

## (undated) DEVICE — SUTURE PDS II SZ 0 L18IN ABSRB VLT L36MM CT-1 1/2 CIR Z740D

## (undated) DEVICE — PAD,NON-ADHERENT,3X8,STERILE,LF,1/PK: Brand: MEDLINE

## (undated) DEVICE — KIT EVAC 400CC DIA1/4IN H PAT 12.5IN 3 SPR RND SHP PVC DRN

## (undated) DEVICE — ZIMMER® STERILE DISPOSABLE TOURNIQUET CUFF WITH PLC, DUAL PORT, SINGLE BLADDER, 42 IN. (107 CM)

## (undated) DEVICE — YANKAUER,OPEN TIP,W/O VENT,STERILE: Brand: MEDLINE INDUSTRIES, INC.

## (undated) DEVICE — SUTURE PDS II SZ 2-0 L27IN ABSRB VLT L26MM CT-2 1/2 CIR Z333H

## (undated) DEVICE — GOWN,SIRUS,POLYRNF,BRTHSLV,XL,30/CS: Brand: MEDLINE

## (undated) DEVICE — PACK PROCEDURE SURG TOTAL KNEE

## (undated) DEVICE — 3M™ STERI-DRAPE™ U-DRAPE 1015: Brand: STERI-DRAPE™

## (undated) DEVICE — Z DISCONTINUED USE 2275686 GLOVE SURG SZ 8 L12IN FNGR THK13MIL WHT ISOLEX POLYISOPRENE

## (undated) DEVICE — CHLORAPREP 26ML ORANGE

## (undated) DEVICE — SPONGE,LAP,18"X18",DLX,XR,ST,5/PK,40/PK: Brand: MEDLINE

## (undated) DEVICE — TURNOVER KIT RM INF CTRL TECH

## (undated) DEVICE — COVER,TABLE,HEAVY DUTY,77"X90",STRL: Brand: MEDLINE

## (undated) DEVICE — SYRINGE MED 30ML STD CLR PLAS LUERLOCK TIP N CTRL DISP

## (undated) DEVICE — PLATE ES AD W 9FT CRD 2

## (undated) DEVICE — HANDPIECE SET WITH SUCTION TUBING: Brand: INTERPULSE

## (undated) DEVICE — SOLUTION IV 1000ML 0.9% SOD CHL

## (undated) DEVICE — GOWN,SIRUS,POLYRNF,BRTHSLV,LG,30/CS: Brand: MEDLINE

## (undated) DEVICE — SUTURE PDS II SZ 0 L27IN ABSRB VLT L36MM CT-1 1/2 CIR Z340H

## (undated) DEVICE — TRAY PREP DRY W/ PREM GLV 2 APPL 6 SPNG 2 UNDPD 1 OVERWRAP

## (undated) DEVICE — SUTURE VCRL SZ 0 L18IN ABSRB UD L36MM CT-1 1/2 CIR J840D

## (undated) DEVICE — SUTURE NONABSORBABLE MONOFILAMENT 2-0 FS 18 IN ETHILON 664H

## (undated) DEVICE — STANDARD HYPODERMIC NEEDLE,POLYPROPYLENE HUB: Brand: MONOJECT

## (undated) DEVICE — ZIMMER® STERILE DISPOSABLE TOURNIQUET CUFF WITH PLC, DUAL PORT, SINGLE BLADDER, 34 IN. (86 CM)

## (undated) DEVICE — Z CONVERTED USE 2271043 CONTAINER SPEC COLL 4OZ SCR ON LID PEEL PCH

## (undated) DEVICE — SUTURE VCRL UD BR CT 3-0 18IN CT1 J838D

## (undated) DEVICE — PODIATRY PK

## (undated) DEVICE — CONTAINER,SPECIMEN,PNEU TUBE,3OZ,OR STRL: Brand: MEDLINE

## (undated) DEVICE — SUTURE PDS II SZ 2-0 L18IN ABSRB VLT SH L26MM 1/2 CIR TAPR Z775D

## (undated) DEVICE — APPLICATOR PREP 26ML 0.7% IOD POVACRYLEX 74% ISO ALC ST

## (undated) DEVICE — JEWISH HOSPITAL TURNOVER KIT: Brand: MEDLINE INDUSTRIES, INC.